# Patient Record
Sex: MALE | Race: WHITE | NOT HISPANIC OR LATINO | URBAN - METROPOLITAN AREA
[De-identification: names, ages, dates, MRNs, and addresses within clinical notes are randomized per-mention and may not be internally consistent; named-entity substitution may affect disease eponyms.]

---

## 2018-03-16 ENCOUNTER — INPATIENT (INPATIENT)
Facility: HOSPITAL | Age: 68
LOS: 4 days | Discharge: NURSING, REHAB OR LONG-TERM CARE | DRG: 536 | End: 2018-03-21
Attending: INTERNAL MEDICINE | Admitting: INTERNAL MEDICINE
Payer: COMMERCIAL

## 2018-03-16 VITALS
OXYGEN SATURATION: 100 % | DIASTOLIC BLOOD PRESSURE: 85 MMHG | SYSTOLIC BLOOD PRESSURE: 127 MMHG | RESPIRATION RATE: 16 BRPM | HEART RATE: 81 BPM | TEMPERATURE: 98 F

## 2018-03-16 DIAGNOSIS — M97.8XXA PERIPROSTHETIC FRACTURE AROUND OTHER INTERNAL PROSTHETIC JOINT, INITIAL ENCOUNTER: ICD-10-CM

## 2018-03-16 DIAGNOSIS — R56.9 UNSPECIFIED CONVULSIONS: ICD-10-CM

## 2018-03-16 DIAGNOSIS — R63.8 OTHER SYMPTOMS AND SIGNS CONCERNING FOOD AND FLUID INTAKE: ICD-10-CM
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
## 2018-03-16 DIAGNOSIS — G40.909 EPILEPSY, UNSPECIFIED, NOT INTRACTABLE, WITHOUT STATUS EPILEPTICUS: ICD-10-CM
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
## 2018-03-16 DIAGNOSIS — E87.1 HYPO-OSMOLALITY AND HYPONATREMIA: ICD-10-CM

## 2018-03-16 DIAGNOSIS — Z29.9 ENCOUNTER FOR PROPHYLACTIC MEASURES, UNSPECIFIED: ICD-10-CM
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
## 2018-03-16 DIAGNOSIS — Z96.649 PRESENCE OF UNSPECIFIED ARTIFICIAL HIP JOINT: Chronic | ICD-10-CM

## 2018-03-16 DIAGNOSIS — S72.002A FRACTURE OF UNSPECIFIED PART OF NECK OF LEFT FEMUR, INITIAL ENCOUNTER FOR CLOSED FRACTURE: ICD-10-CM

## 2018-03-16 DIAGNOSIS — F10.10 ALCOHOL ABUSE, UNCOMPLICATED: ICD-10-CM

## 2018-03-16 LAB
ALBUMIN SERPL ELPH-MCNC: 4.1 G/DL — SIGNIFICANT CHANGE UP (ref 3.3–5)
ALP SERPL-CCNC: 62 U/L — SIGNIFICANT CHANGE UP (ref 40–120)
ALT FLD-CCNC: 21 U/L — SIGNIFICANT CHANGE UP (ref 10–45)
ANION GAP SERPL CALC-SCNC: 20 MMOL/L — HIGH (ref 5–17)
APTT BLD: 27.5 SEC — SIGNIFICANT CHANGE UP (ref 27.5–37.4)
AST SERPL-CCNC: 41 U/L — HIGH (ref 10–40)
B-OH-BUTYR SERPL-SCNC: 0.7 MMOL/L — HIGH
BASOPHILS NFR BLD AUTO: 0.8 % — SIGNIFICANT CHANGE UP (ref 0–2)
BILIRUB SERPL-MCNC: 0.8 MG/DL — SIGNIFICANT CHANGE UP (ref 0.2–1.2)
BLD GP AB SCN SERPL QL: NEGATIVE — SIGNIFICANT CHANGE UP
BUN SERPL-MCNC: 9 MG/DL — SIGNIFICANT CHANGE UP (ref 7–23)
CALCIUM SERPL-MCNC: 8.5 MG/DL — SIGNIFICANT CHANGE UP (ref 8.4–10.5)
CHLORIDE SERPL-SCNC: 81 MMOL/L — LOW (ref 96–108)
CHOLEST SERPL-MCNC: 215 MG/DL — HIGH (ref 10–199)
CO2 SERPL-SCNC: 29 MMOL/L — SIGNIFICANT CHANGE UP (ref 22–31)
CREAT SERPL-MCNC: 0.74 MG/DL — SIGNIFICANT CHANGE UP (ref 0.5–1.3)
EOSINOPHIL NFR BLD AUTO: 0.8 % — SIGNIFICANT CHANGE UP (ref 0–6)
GLUCOSE SERPL-MCNC: 116 MG/DL — HIGH (ref 70–99)
HCT VFR BLD CALC: 42.6 % — SIGNIFICANT CHANGE UP (ref 39–50)
HDLC SERPL-MCNC: 105 MG/DL — SIGNIFICANT CHANGE UP (ref 40–125)
HGB BLD-MCNC: 15.2 G/DL — SIGNIFICANT CHANGE UP (ref 13–17)
INR BLD: 1.08 — SIGNIFICANT CHANGE UP (ref 0.88–1.16)
LIPID PNL WITH DIRECT LDL SERPL: 87 MG/DL — SIGNIFICANT CHANGE UP
LYMPHOCYTES # BLD AUTO: 12.1 % — LOW (ref 13–44)
MAGNESIUM SERPL-MCNC: 2 MG/DL — SIGNIFICANT CHANGE UP (ref 1.6–2.6)
MCHC RBC-ENTMCNC: 34.3 PG — HIGH (ref 27–34)
MCHC RBC-ENTMCNC: 35.7 G/DL — SIGNIFICANT CHANGE UP (ref 32–36)
MCV RBC AUTO: 96.2 FL — SIGNIFICANT CHANGE UP (ref 80–100)
MONOCYTES NFR BLD AUTO: 12.6 % — SIGNIFICANT CHANGE UP (ref 2–14)
NEUTROPHILS NFR BLD AUTO: 73.7 % — SIGNIFICANT CHANGE UP (ref 43–77)
OSMOLALITY SERPL: 315 MOSM/KG — HIGH (ref 280–301)
PHOSPHATE SERPL-MCNC: 3 MG/DL — SIGNIFICANT CHANGE UP (ref 2.5–4.5)
PLATELET # BLD AUTO: 257 K/UL — SIGNIFICANT CHANGE UP (ref 150–400)
POTASSIUM SERPL-MCNC: 3 MMOL/L — LOW (ref 3.5–5.3)
POTASSIUM SERPL-SCNC: 3 MMOL/L — LOW (ref 3.5–5.3)
PROT SERPL-MCNC: 7.4 G/DL — SIGNIFICANT CHANGE UP (ref 6–8.3)
PROTHROM AB SERPL-ACNC: 12 SEC — SIGNIFICANT CHANGE UP (ref 9.8–12.7)
RBC # BLD: 4.43 M/UL — SIGNIFICANT CHANGE UP (ref 4.2–5.8)
RBC # FLD: 12.3 % — SIGNIFICANT CHANGE UP (ref 10.3–16.9)
RH IG SCN BLD-IMP: POSITIVE — SIGNIFICANT CHANGE UP
SODIUM SERPL-SCNC: 130 MMOL/L — LOW (ref 135–145)
TOTAL CHOLESTEROL/HDL RATIO MEASUREMENT: 2 RATIO — LOW (ref 3.4–9.6)
TRIGL SERPL-MCNC: 115 MG/DL — SIGNIFICANT CHANGE UP (ref 10–149)
WBC # BLD: 6.5 K/UL — SIGNIFICANT CHANGE UP (ref 3.8–10.5)
WBC # FLD AUTO: 6.5 K/UL — SIGNIFICANT CHANGE UP (ref 3.8–10.5)

## 2018-03-16 PROCEDURE — 72192 CT PELVIS W/O DYE: CPT | Mod: 26

## 2018-03-16 PROCEDURE — 73502 X-RAY EXAM HIP UNI 2-3 VIEWS: CPT | Mod: 26,LT

## 2018-03-16 PROCEDURE — 99285 EMERGENCY DEPT VISIT HI MDM: CPT

## 2018-03-16 PROCEDURE — 73552 X-RAY EXAM OF FEMUR 2/>: CPT | Mod: 26,LT

## 2018-03-16 RX ORDER — TIMOLOL 0.5 %
1 DROPS OPHTHALMIC (EYE)
Qty: 0 | Refills: 0 | Status: DISCONTINUED | OUTPATIENT
Start: 2018-03-16 | End: 2018-03-21

## 2018-03-16 RX ORDER — PREDNISOLONE SODIUM PHOSPHATE 1 %
1 DROPS OPHTHALMIC (EYE) DAILY
Qty: 0 | Refills: 0 | Status: DISCONTINUED | OUTPATIENT
Start: 2018-03-16 | End: 2018-03-21

## 2018-03-16 RX ORDER — LIDOCAINE 4 G/100G
1 CREAM TOPICAL ONCE
Qty: 0 | Refills: 0 | Status: COMPLETED | OUTPATIENT
Start: 2018-03-16 | End: 2018-03-16

## 2018-03-16 RX ORDER — POTASSIUM CHLORIDE 20 MEQ
60 PACKET (EA) ORAL ONCE
Qty: 0 | Refills: 0 | Status: COMPLETED | OUTPATIENT
Start: 2018-03-16 | End: 2018-03-16

## 2018-03-16 RX ORDER — ACETAMINOPHEN 500 MG
975 TABLET ORAL ONCE
Qty: 0 | Refills: 0 | Status: COMPLETED | OUTPATIENT
Start: 2018-03-16 | End: 2018-03-16

## 2018-03-16 RX ORDER — SODIUM CHLORIDE 9 MG/ML
1000 INJECTION, SOLUTION INTRAVENOUS
Qty: 0 | Refills: 0 | Status: DISCONTINUED | OUTPATIENT
Start: 2018-03-16 | End: 2018-03-17

## 2018-03-16 RX ORDER — HEPARIN SODIUM 5000 [USP'U]/ML
5000 INJECTION INTRAVENOUS; SUBCUTANEOUS EVERY 8 HOURS
Qty: 0 | Refills: 0 | Status: DISCONTINUED | OUTPATIENT
Start: 2018-03-16 | End: 2018-03-21

## 2018-03-16 RX ADMIN — Medication 975 MILLIGRAM(S): at 19:34

## 2018-03-16 RX ADMIN — Medication 975 MILLIGRAM(S): at 20:09

## 2018-03-16 RX ADMIN — LIDOCAINE 1 PATCH: 4 CREAM TOPICAL at 19:35

## 2018-03-16 RX ADMIN — Medication 60 MILLIEQUIVALENT(S): at 23:00

## 2018-03-16 RX ADMIN — Medication 1 DROP(S): at 23:59

## 2018-03-16 RX ADMIN — SODIUM CHLORIDE 100 MILLILITER(S): 9 INJECTION, SOLUTION INTRAVENOUS at 23:59

## 2018-03-16 NOTE — H&P ADULT - PROBLEM SELECTOR PLAN 3
- patient reports history of alcohol abuse, ?withdrawal seizures but patient does not know - currently drinks 4 beers/day - last drink was on evening of 3/15/18 although was at bar tonight   - will check alcohol level   - current CIWA 0 however will monitor CIWA q4hr and monitor for signs of withdrawal   - start banana bag in setting of dehydration and encourage PO - then transition to PO multivitamin, folate, and thiamine

## 2018-03-16 NOTE — H&P ADULT - PROBLEM SELECTOR PLAN 1
- patient presenting today after standing up with severe left leg pain and difficulty with ambulation, neurovascularly intact just tender to palpation on lateral thigh - CT revealed "periprosthetic fracture along the lateral aspect of the left proximal   femur; no underlying osseous lesion is identified to indicate pathologic fracture, however the mechanism of injury is suspicious for pathologic fracture"   - evaluated by ortho - no surgical management at this time   - unclear mechanism of injury - patient says he had a fall approximately 1 month ago, but never had any pain associated with fall previous to today   - concern for possible pathologic fracture - is not UTD with cancer screenings (but no family history of cancer and no smoking history) - no other systemic symptoms or local symptoms elsewhere to suggest any other bony lesions   - will need close outpatient follow up to rule out any underlying malignancy   - as per ortho, WBAT  - consult PT   - pain control with tylenol prn and oxycodone prn with bowel regimen

## 2018-03-16 NOTE — H&P ADULT - NSHPSOCIALHISTORY_GEN_ALL_CORE
Lives with girlfriend. Drinks 4 drinks/day. Never smoker or recreational drug use. Recently retired .

## 2018-03-16 NOTE — CONSULT NOTE ADULT - SUBJECTIVE AND OBJECTIVE BOX
Orthopaedic Consult Note    Consult Requested by:   Surgeon:    CC:Patient is a 67y old  Male who presents with a chief complaint of L hip pain    HPI  66 yo M p/w L hip pain post fall earlier today.  Pt states had L THR done >15y ago at [facility] and has had no issues up until this point. Denies fevers, prior hip pain.     PAST MEDICAL & SURGICAL HISTORY:  Seizure    Allergies    No Known Allergies    Intolerances      MEDICATIONS  (STANDING):  potassium chloride   Powder 60 milliEquivalent(s) Oral once      Vital Signs Last 24 Hrs  T(C): 37 (16 Mar 2018 21:20), Max: 37 (16 Mar 2018 21:20)  T(F): 98.6 (16 Mar 2018 21:20), Max: 98.6 (16 Mar 2018 21:20)  HR: 77 (16 Mar 2018 21:20) (77 - 81)  BP: 124/69 (16 Mar 2018 21:20) (124/69 - 127/85)  BP(mean): --  RR: 16 (16 Mar 2018 21:20) (16 - 16)  SpO2: 100% (16 Mar 2018 21:20) (100% - 100%)    Physical Exam:  AAOx3, NAD  Supine ER bed  L hip TTP  SLR painful at 30 degrees of hip flexion localized at proximal femur  Log roll not painful  Motor/sensory intact  DP/PT 2+  WWP                          15.2   6.5   )-----------( 257      ( 16 Mar 2018 19:35 )             42.6     03-16    130<L>  |  81<L>  |  9   ----------------------------<  116<H>  3.0<L>   |  29  |  0.74    Ca    8.5      16 Mar 2018 19:35    TPro  7.4  /  Alb  4.1  /  TBili  0.8  /  DBili  x   /  AST  41<H>  /  ALT  21  /  AlkPhos  62  03-16    Imaging:   XR w/ L hip s/p THR w/ periprosthetic fracture at proximal aspect of stem - Vanc B1  Distal stem appears to be well-fixed to bone    A/P: 67yMale w/ above  - WBAT  - No sx management at this point in time  - Will follow from ortho standpoint while in house  - f/u w/ Dr Mcnulty outpatient  - Rec. PT  - AM Labs  - Patient educated on findings and prognosis   Discussed with Dr Mcnulty Orthopaedic Consult Note    Consult Requested by:   Surgeon:    CC:Patient is a 67y old  Male who presents with a chief complaint of L hip pain    HPI  66 yo M p/w L hip pain post fall earlier today.  Pt states had L THR done >15y ago at [facility] and has had no issues up until this point. Denies fevers, prior hip pain.     PAST MEDICAL & SURGICAL HISTORY:  Seizure    Allergies    No Known Allergies    Intolerances      MEDICATIONS  (STANDING):  potassium chloride   Powder 60 milliEquivalent(s) Oral once      Vital Signs Last 24 Hrs  T(C): 37 (16 Mar 2018 21:20), Max: 37 (16 Mar 2018 21:20)  T(F): 98.6 (16 Mar 2018 21:20), Max: 98.6 (16 Mar 2018 21:20)  HR: 77 (16 Mar 2018 21:20) (77 - 81)  BP: 124/69 (16 Mar 2018 21:20) (124/69 - 127/85)  BP(mean): --  RR: 16 (16 Mar 2018 21:20) (16 - 16)  SpO2: 100% (16 Mar 2018 21:20) (100% - 100%)    Physical Exam:  AAOx3, NAD  Supine ER bed  L hip TTP  SLR painful at 30 degrees of hip flexion localized at proximal femur  Log roll not painful  Motor/sensory intact  DP/PT 2+  WWP                          15.2   6.5   )-----------( 257      ( 16 Mar 2018 19:35 )             42.6     03-16    130<L>  |  81<L>  |  9   ----------------------------<  116<H>  3.0<L>   |  29  |  0.74    Ca    8.5      16 Mar 2018 19:35    TPro  7.4  /  Alb  4.1  /  TBili  0.8  /  DBili  x   /  AST  41<H>  /  ALT  21  /  AlkPhos  62  03-16    Imaging:   XR w/ L hip s/p THR w/ periprosthetic fracture at proximal aspect of stem - Vanc B1  Distal stem appears to be well-fixed to bone    A/P: 67yMale w/ above  - WBAT w/ assistance  - No sx management at this point in time  - Will follow from ortho standpoint while in house  - f/u w/ Dr Mcnulty outpatient  - Rec. PT  - AM Labs  - Patient educated on findings and prognosis   Discussed with Dr Mcnulty

## 2018-03-16 NOTE — H&P ADULT - NSHPPHYSICALEXAM_GEN_ALL_CORE
.  VITAL SIGNS:  T(C): 36.8 (03-16-18 @ 17:25), Max: 36.8 (03-16-18 @ 17:25)  T(F): 98.2 (03-16-18 @ 17:25), Max: 98.2 (03-16-18 @ 17:25)  HR: 81 (03-16-18 @ 17:25) (81 - 81)  BP: 127/85 (03-16-18 @ 17:25) (127/85 - 127/85)  BP(mean): --  RR: 16 (03-16-18 @ 17:25) (16 - 16)  SpO2: 100% (03-16-18 @ 17:25) (100% - 100%)  Wt(kg): --    PHYSICAL EXAM:    Constitutional: WDWN resting comfortably in bed; NAD  Head: NC/AT  Eyes: PERRL, EOMI, clear conjunctiva  ENT: no nasal discharge; uvula midline, no oropharyngeal erythema or exudates; MMM  Neck: supple; no JVD or thyromegaly  Respiratory: CTA B/L; no W/R/R, no retractions  Cardiac: +S1/S2; RRR; no M/R/G; PMI non-displaced  Gastrointestinal: soft, NT/ND; no rebound or guarding; +BSx4  Genitourinary: normal external genitalia  Back: spine midline, no bony tenderness or step-offs; no CVAT B/L  Extremities: WWP, no clubbing or cyanosis; no peripheral edema  Musculoskeletal: NROM x4; no joint swelling, tenderness or erythema  Vascular: 2+ radial, femoral, DP/PT pulses B/L  Dermatologic: skin warm, dry and intact; no rashes, wounds, or scars  Lymphatic: no submandibular or cervical LAD  Neurologic: AAOx3; CNII-XII grossly intact; no focal deficits  Psychiatric: affect and characteristics of appearance, verbalizations, behaviors are appropriate .  VITAL SIGNS:  T(C): 36.8 (03-16-18 @ 17:25), Max: 36.8 (03-16-18 @ 17:25)  T(F): 98.2 (03-16-18 @ 17:25), Max: 98.2 (03-16-18 @ 17:25)  HR: 81 (03-16-18 @ 17:25) (81 - 81)  BP: 127/85 (03-16-18 @ 17:25) (127/85 - 127/85)  BP(mean): --  RR: 16 (03-16-18 @ 17:25) (16 - 16)  SpO2: 100% (03-16-18 @ 17:25) (100% - 100%)  Wt(kg): --    PHYSICAL EXAM:    Constitutional: pleasant, resting comfortably in bed; NAD  Eyes: PERRL, EOMI, clear conjunctiva  ENT: no nasal discharge; uvula midline, no oropharyngeal erythema or exudates; dry mucous membranes  Neck: supple; no JVD or thyromegaly  Respiratory: CTA B/L; no W/R/R  Cardiac: +S1/S2; RRR; no murmurs  Gastrointestinal: soft, NT/ND; no rebound or guarding; +BSx4  Back: spine midline, no bony tenderness or step-offs; no CVAT B/L  Extremities: WWP, no clubbing or cyanosis; no peripheral edema  Musculoskeletal: NROM x4; no joint swelling; tenderness on lateral aspect of left thigh but full range of motion and 5/5 strength in hip/knee flexors/extensors  Vascular: 2+ radial, femoral, DP/PT pulses B/L  Dermatologic: skin warm, dry and intact; no rashes, wounds, or scars  Lymphatic: no submandibular or cervical LAD  Neurologic: AAOx3; CNII-XII grossly intact; no focal deficits

## 2018-03-16 NOTE — H&P ADULT - ASSESSMENT
68 yo M with seizure disorder (on dilantin), alcohol use, O/A s/p left hip arthroplasty (many years ago) presents after acute episode of left leg pain today, found to have left periprosthetic fracture along lateral aspect of left proximal femur, non operable, admit for pain control and PT.

## 2018-03-16 NOTE — ED PROVIDER NOTE - OBJECTIVE STATEMENT
67 yom pw L buttock and anterior thigh pain today, no preceding trauma.  pt states he was sitting at a bar, for a period of time but intermitting getting up per wife, however, toward the end, felt pain to the aforementioned area, and worse w/ weight bearing.  no prosthesis, no fc, no back pain, no weakness.  pain only localized to the L lower buttock and anterior thigh above the knee.  no swelling. 67 yom pw L buttock and anterior thigh pain today, no preceding trauma.  pt states he was sitting at a bar, for a period of time but intermitting getting up per wife, however, toward the end, felt pain to the aforementioned area, and worse w/ weight bearing.  hx of prosthesis, no fc, no back pain, no weakness.  pain only localized to the L lower buttock and anterior thigh above the knee.  no swelling.

## 2018-03-16 NOTE — ED PROVIDER NOTE - MEDICAL DECISION MAKING DETAILS
atraumatic L buttock and left anterior thigh, nonradiating, reproducible L buttock tenderness, no paresthesia, no back pain, full ROM and strength 5/5 in LE, able to stand but reports pain in L buttock, will obtain imaging studies, very low suspicion for cord compression

## 2018-03-16 NOTE — H&P ADULT - NSHPLABSRESULTS_GEN_ALL_CORE
LABS:                         15.2   6.5   )-----------( 257      ( 16 Mar 2018 19:35 )             42.6     03-16    130<L>  |  81<L>  |  9   ----------------------------<  116<H>  3.0<L>   |  29  |  0.74    Ca    8.5      16 Mar 2018 19:35    TPro  7.4  /  Alb  4.1  /  TBili  0.8  /  DBili  x   /  AST  41<H>  /  ALT  21  /  AlkPhos  62  03-16    PT/INR - ( 16 Mar 2018 19:35 )   PT: 12.0 sec;   INR: 1.08          PTT - ( 16 Mar 2018 19:35 )  PTT:27.5 sec              RADIOLOGY, EKG & ADDITIONAL TESTS:   CT Pelvis:  < from: CT Pelvis Bony Only No Cont (03.16.18 @ 19:29) >    The patient is status post left hip total arthroplasty. There is a   periprosthetic fracturealong the lateral aspect of the left proximal   femur.  The distal fracture fragment is displaced posteriorly up to 6 mm.   No underlying osseous lesion is identified to indicate pathologic   fracture. There is no associated soft tissue mass either. The adjacent   musculature in the anterior thigh appears minimally edematous which could   be posttraumatic. The musculature is otherwise symmetric. There is mild   infiltration of the simultaneous fat on the right. No other fractures   identified. There is degenerative change of the visualized lower lumbar   spine.    No significant abnormality of the intrapelvic contents is visualized.     < end of copied text >

## 2018-03-16 NOTE — ED ADULT NURSE NOTE - OBJECTIVE STATEMENT
68 y/o male c/o left leg pain and numbness. Difficulty bearing weight on leg, no deformity noted. Hx of left leg deformity.

## 2018-03-16 NOTE — ED PROVIDER NOTE - PHYSICAL EXAMINATION
CON: ao x 3, HENMT: clear oropharynx, soft neck, HEAD: atraumatic, CV: rrr, equal pulses b/l, RESP: cta b/l, GI: +BS, soft, nontender, no rebound, no guarding, SKIN: no rash, MSK: no deformities, tenderness to left lower buttock, full ROM active and passive, stable pelvis, no shortening, soft compartment, NEURO: strength 5/5 equal bl in hip flexion/extension, knee flexion/extension, dorsiplantarflexion, and EHL, sensation intact, no paresthesia to perineal region

## 2018-03-16 NOTE — ED PROVIDER NOTE - CARE PLAN
Principal Discharge DX:	Periprosthetic fracture of shaft of femur  Secondary Diagnosis:	Unable to ambulate

## 2018-03-16 NOTE — ED ADULT TRIAGE NOTE - CHIEF COMPLAINT QUOTE
Pt BIBA from bar, states he had 3 alcohol drinks and while sitting began having L leg numbness and when he went to stand he had L upper leg pain and couldn't bear weight. Pt has hx of L hip replacement and seizures. Denies any recent falls. Pt BIBA from bar, states he had 3 alcohol drinks and while sitting began having L leg numbness and when he went to stand he had L upper leg pain and couldn't bear weight. Pt has hx of L hip replacement and seizures. Denies any recent falls.  in triage

## 2018-03-16 NOTE — H&P ADULT - PROBLEM SELECTOR PLAN 2
- patient reports history of seizures "many years ago" and is unclear about etiology but thinks it may have been from heavy alcohol use   - has not had seizure in "many years" and was told by PMD may not need dilantin so has been non compliant, taking only 2-3x/week   - will check dilantin level for now and continue while inpatient but counseled patient he will need close outpatient follow up with close tapering to avoid further seizure episodes and not to stop abruptly

## 2018-03-16 NOTE — ED ADULT NURSE NOTE - CHIEF COMPLAINT QUOTE
Pt BIBA from bar, states he had 3 alcohol drinks and while sitting began having L leg numbness and when he went to stand he had L upper leg pain and couldn't bear weight. Pt has hx of L hip replacement and seizures. Denies any recent falls.  in triage

## 2018-03-16 NOTE — H&P ADULT - HISTORY OF PRESENT ILLNESS
68 yo M with seizure disorder (on dilantin)       In the ED, Tmax 98.2, /85, HR 81, RR 16, O2 100% on RA. 68 yo M with seizure disorder (on dilantin), alcohol use, O/A s/p left hip arthroplasty (many years ago) presents after acute episode of left leg pain today. Patient states that he was at the bar today and sitting for a few hours when he got up to leave and all of a sudden felt acute pain in his left buttock and anterior thigh, worse with standing, 8/10, non radiating. It was difficult for him to ambulate so he came to ED. Never had pain like this before. Does mention he was running for bus about a month ago and slipped and fell onto his left side but after that fall he never had any associated pain or difficulty with walking thereafter. Otherwise has been in his normal state of health but does say he has been a little more dehydrated lately, no changes in urination.     Of note, does not take dilantin daily. Says he was prescribed "many years ago" for seizures but thinks his seizures were due to heavy drinking. He still continues to drink about 4 beers/day, last drink he reports was yesterday, even though he was at the bar this evening. Last seizure was "many years ago" and was told by his PMD who retired that he may not need dilantin anymore - takes it only about once every 2-3 days. Has a new PMD but does not know his name and only saw him once.    Denies fever, chills, weight loss, night sweats, fatigue, chest pain, dyspnea, cough, abdominal pain, dysuria, changes in urination, paresthesias, or leg swelling.     In the ED, Tmax 98.2, /85, HR 81, RR 16, O2 100% on RA. Xray and CT done of left leg/hip which revealed "Periprosthetic fracture along the lateral aspect of the left proximal femur". Evaluated by ortho who did feel patient was operable candidate. 68 yo M with seizure disorder (on dilantin), alcohol use, O/A s/p left hip arthroplasty (many years ago) presents after acute episode of left leg pain today. Patient states that he was at the bar today and sitting for a few hours when he got up to leave and all of a sudden felt acute pain in his left buttock and anterior thigh, worse with standing, 8/10, non radiating. It was difficult for him to ambulate so he came to ED. Never had pain like this before. Does mention he was running for bus about a month ago and slipped and fell onto his left side but after that fall he never had any associated pain or difficulty with walking thereafter. Otherwise has been in his normal state of health but does say he has been a little more dehydrated lately, no changes in urination.     Of note, does not take dilantin daily. Says he was prescribed "many years ago" for seizures but thinks his seizures were due to heavy drinking. He still continues to drink about 4 beers/day, last drink he reports was yesterday, even though he was at the bar this evening. Last seizure was "many years ago" and was told by his PMD who retired that he may not need dilantin anymore - takes it only about once every 2-3 days. Has a new PMD but does not know his name and only saw him once.    Denies fever, chills, weight loss, night sweats, fatigue, chest pain, dyspnea, cough, abdominal pain, dysuria, changes in urination, paresthesias, or leg swelling.     In the ED, Tmax 98.2, /85, HR 81, RR 16, O2 100% on RA. Xray and CT done of left leg/hip which revealed "Periprosthetic fracture along the lateral aspect of the left proximal femur". Evaluated by ortho who did not feel patient was operable candidate.

## 2018-03-16 NOTE — H&P ADULT - PROBLEM SELECTOR PLAN 4
- hypertonic hypovolemic hyponatremia - serum osm elevated at 317 - unclear etiology as normal glucose, denies any abnormal ingestions - will check lipid panel to rule out other etiologies   - also appears dry on exam, will give IVF - hypertonic hypovolemic hyponatremia - serum osm elevated at 317 - unclear etiology as normal glucose, denies any abnormal ingestions - will check lipid panel to rule out other etiologies   - also appears dry on exam, will give IVF  - check urine osm and urine lytes   - trend BMP, goal Na 138 within 24 hours (no more than 8meq/24 hours)

## 2018-03-17 ENCOUNTER — TRANSCRIPTION ENCOUNTER (OUTPATIENT)
Age: 68
End: 2018-03-17

## 2018-03-17 LAB
ANION GAP SERPL CALC-SCNC: 12 MMOL/L — SIGNIFICANT CHANGE UP (ref 5–17)
ANION GAP SERPL CALC-SCNC: 15 MMOL/L — SIGNIFICANT CHANGE UP (ref 5–17)
APPEARANCE UR: CLEAR — SIGNIFICANT CHANGE UP
BASOPHILS NFR BLD AUTO: 0.2 % — SIGNIFICANT CHANGE UP (ref 0–2)
BILIRUB UR-MCNC: NEGATIVE — SIGNIFICANT CHANGE UP
BUN SERPL-MCNC: 6 MG/DL — LOW (ref 7–23)
BUN SERPL-MCNC: 7 MG/DL — SIGNIFICANT CHANGE UP (ref 7–23)
CALCIUM SERPL-MCNC: 8.5 MG/DL — SIGNIFICANT CHANGE UP (ref 8.4–10.5)
CALCIUM SERPL-MCNC: 8.5 MG/DL — SIGNIFICANT CHANGE UP (ref 8.4–10.5)
CHLORIDE SERPL-SCNC: 87 MMOL/L — LOW (ref 96–108)
CHLORIDE SERPL-SCNC: 88 MMOL/L — LOW (ref 96–108)
CO2 SERPL-SCNC: 30 MMOL/L — SIGNIFICANT CHANGE UP (ref 22–31)
CO2 SERPL-SCNC: 33 MMOL/L — HIGH (ref 22–31)
COLOR SPEC: YELLOW — SIGNIFICANT CHANGE UP
CREAT ?TM UR-MCNC: 90 MG/DL — SIGNIFICANT CHANGE UP
CREAT SERPL-MCNC: 0.6 MG/DL — SIGNIFICANT CHANGE UP (ref 0.5–1.3)
CREAT SERPL-MCNC: 0.66 MG/DL — SIGNIFICANT CHANGE UP (ref 0.5–1.3)
DIFF PNL FLD: NEGATIVE — SIGNIFICANT CHANGE UP
EOSINOPHIL NFR BLD AUTO: 1.2 % — SIGNIFICANT CHANGE UP (ref 0–6)
GLUCOSE SERPL-MCNC: 100 MG/DL — HIGH (ref 70–99)
GLUCOSE SERPL-MCNC: 99 MG/DL — SIGNIFICANT CHANGE UP (ref 70–99)
GLUCOSE UR QL: NEGATIVE — SIGNIFICANT CHANGE UP
HCT VFR BLD CALC: 41 % — SIGNIFICANT CHANGE UP (ref 39–50)
HGB BLD-MCNC: 14.8 G/DL — SIGNIFICANT CHANGE UP (ref 13–17)
KETONES UR-MCNC: (no result) MG/DL
LEUKOCYTE ESTERASE UR-ACNC: NEGATIVE — SIGNIFICANT CHANGE UP
LYMPHOCYTES # BLD AUTO: 9.3 % — LOW (ref 13–44)
MAGNESIUM SERPL-MCNC: 2 MG/DL — SIGNIFICANT CHANGE UP (ref 1.6–2.6)
MCHC RBC-ENTMCNC: 34.7 PG — HIGH (ref 27–34)
MCHC RBC-ENTMCNC: 36.1 G/DL — HIGH (ref 32–36)
MCV RBC AUTO: 96 FL — SIGNIFICANT CHANGE UP (ref 80–100)
MONOCYTES NFR BLD AUTO: 14.9 % — HIGH (ref 2–14)
NEUTROPHILS NFR BLD AUTO: 74.4 % — SIGNIFICANT CHANGE UP (ref 43–77)
NITRITE UR-MCNC: NEGATIVE — SIGNIFICANT CHANGE UP
OSMOLALITY UR: 323 MOSMOL/KG — SIGNIFICANT CHANGE UP (ref 100–650)
PH UR: 6 — SIGNIFICANT CHANGE UP (ref 5–8)
PHENYTOIN FREE SERPL-MCNC: 7.9 UG/ML — LOW (ref 10–20)
PHOSPHATE SERPL-MCNC: 2.2 MG/DL — LOW (ref 2.5–4.5)
PLATELET # BLD AUTO: 222 K/UL — SIGNIFICANT CHANGE UP (ref 150–400)
POTASSIUM SERPL-MCNC: 3.7 MMOL/L — SIGNIFICANT CHANGE UP (ref 3.5–5.3)
POTASSIUM SERPL-MCNC: 3.9 MMOL/L — SIGNIFICANT CHANGE UP (ref 3.5–5.3)
POTASSIUM SERPL-SCNC: 3.7 MMOL/L — SIGNIFICANT CHANGE UP (ref 3.5–5.3)
POTASSIUM SERPL-SCNC: 3.9 MMOL/L — SIGNIFICANT CHANGE UP (ref 3.5–5.3)
PROT UR-MCNC: NEGATIVE MG/DL — SIGNIFICANT CHANGE UP
RBC # BLD: 4.27 M/UL — SIGNIFICANT CHANGE UP (ref 4.2–5.8)
RBC # FLD: 12.2 % — SIGNIFICANT CHANGE UP (ref 10.3–16.9)
SODIUM SERPL-SCNC: 132 MMOL/L — LOW (ref 135–145)
SODIUM SERPL-SCNC: 133 MMOL/L — LOW (ref 135–145)
SODIUM UR-SCNC: 37 MMOL/L — SIGNIFICANT CHANGE UP
SP GR SPEC: 1.01 — SIGNIFICANT CHANGE UP (ref 1–1.03)
UROBILINOGEN FLD QL: 1 E.U./DL — SIGNIFICANT CHANGE UP
WBC # BLD: 6.6 K/UL — SIGNIFICANT CHANGE UP (ref 3.8–10.5)
WBC # FLD AUTO: 6.6 K/UL — SIGNIFICANT CHANGE UP (ref 3.8–10.5)

## 2018-03-17 PROCEDURE — 99232 SBSQ HOSP IP/OBS MODERATE 35: CPT

## 2018-03-17 RX ORDER — ACETAMINOPHEN 500 MG
2 TABLET ORAL
Qty: 0 | Refills: 0 | DISCHARGE
Start: 2018-03-17

## 2018-03-17 RX ORDER — DOCUSATE SODIUM 100 MG
1 CAPSULE ORAL
Qty: 0 | Refills: 0 | COMMUNITY
Start: 2018-03-17

## 2018-03-17 RX ORDER — OXYCODONE HYDROCHLORIDE 5 MG/1
5 TABLET ORAL EVERY 6 HOURS
Qty: 0 | Refills: 0 | Status: DISCONTINUED | OUTPATIENT
Start: 2018-03-17 | End: 2018-03-21

## 2018-03-17 RX ORDER — THIAMINE MONONITRATE (VIT B1) 100 MG
100 TABLET ORAL DAILY
Qty: 0 | Refills: 0 | Status: DISCONTINUED | OUTPATIENT
Start: 2018-03-17 | End: 2018-03-21

## 2018-03-17 RX ORDER — THIAMINE MONONITRATE (VIT B1) 100 MG
1 TABLET ORAL
Qty: 0 | Refills: 0 | DISCHARGE
Start: 2018-03-17

## 2018-03-17 RX ORDER — DOCUSATE SODIUM 100 MG
100 CAPSULE ORAL
Qty: 0 | Refills: 0 | Status: DISCONTINUED | OUTPATIENT
Start: 2018-03-17 | End: 2018-03-21

## 2018-03-17 RX ORDER — PREGABALIN 225 MG/1
1000 CAPSULE ORAL DAILY
Qty: 0 | Refills: 0 | Status: DISCONTINUED | OUTPATIENT
Start: 2018-03-17 | End: 2018-03-17

## 2018-03-17 RX ORDER — ACETAMINOPHEN 500 MG
650 TABLET ORAL EVERY 6 HOURS
Qty: 0 | Refills: 0 | Status: DISCONTINUED | OUTPATIENT
Start: 2018-03-17 | End: 2018-03-21

## 2018-03-17 RX ORDER — FOLIC ACID 0.8 MG
1 TABLET ORAL
Qty: 0 | Refills: 0 | DISCHARGE
Start: 2018-03-17

## 2018-03-17 RX ORDER — FOLIC ACID 0.8 MG
1 TABLET ORAL DAILY
Qty: 0 | Refills: 0 | Status: DISCONTINUED | OUTPATIENT
Start: 2018-03-17 | End: 2018-03-21

## 2018-03-17 RX ORDER — DOCUSATE SODIUM 100 MG
1 CAPSULE ORAL
Qty: 0 | Refills: 0 | DISCHARGE
Start: 2018-03-17

## 2018-03-17 RX ORDER — OXYCODONE HYDROCHLORIDE 5 MG/1
1 TABLET ORAL
Qty: 0 | Refills: 0 | DISCHARGE
Start: 2018-03-17

## 2018-03-17 RX ORDER — SENNA PLUS 8.6 MG/1
2 TABLET ORAL
Qty: 0 | Refills: 0 | COMMUNITY
Start: 2018-03-17

## 2018-03-17 RX ORDER — POTASSIUM CHLORIDE 20 MEQ
40 PACKET (EA) ORAL ONCE
Qty: 0 | Refills: 0 | Status: COMPLETED | OUTPATIENT
Start: 2018-03-17 | End: 2018-03-17

## 2018-03-17 RX ORDER — SENNA PLUS 8.6 MG/1
2 TABLET ORAL
Qty: 0 | Refills: 0 | DISCHARGE
Start: 2018-03-17

## 2018-03-17 RX ORDER — SENNA PLUS 8.6 MG/1
2 TABLET ORAL AT BEDTIME
Qty: 0 | Refills: 0 | Status: DISCONTINUED | OUTPATIENT
Start: 2018-03-17 | End: 2018-03-21

## 2018-03-17 RX ADMIN — HEPARIN SODIUM 5000 UNIT(S): 5000 INJECTION INTRAVENOUS; SUBCUTANEOUS at 13:35

## 2018-03-17 RX ADMIN — Medication 1 TABLET(S): at 13:35

## 2018-03-17 RX ADMIN — Medication 30 MILLIGRAM(S): at 00:18

## 2018-03-17 RX ADMIN — Medication 1 DROP(S): at 21:59

## 2018-03-17 RX ADMIN — Medication 40 MILLIEQUIVALENT(S): at 07:31

## 2018-03-17 RX ADMIN — Medication 650 MILLIGRAM(S): at 06:07

## 2018-03-17 RX ADMIN — HEPARIN SODIUM 5000 UNIT(S): 5000 INJECTION INTRAVENOUS; SUBCUTANEOUS at 05:50

## 2018-03-17 RX ADMIN — Medication 650 MILLIGRAM(S): at 22:02

## 2018-03-17 RX ADMIN — Medication 1 DROP(S): at 00:00

## 2018-03-17 RX ADMIN — OXYCODONE HYDROCHLORIDE 5 MILLIGRAM(S): 5 TABLET ORAL at 18:00

## 2018-03-17 RX ADMIN — HEPARIN SODIUM 5000 UNIT(S): 5000 INJECTION INTRAVENOUS; SUBCUTANEOUS at 21:59

## 2018-03-17 RX ADMIN — OXYCODONE HYDROCHLORIDE 5 MILLIGRAM(S): 5 TABLET ORAL at 23:01

## 2018-03-17 RX ADMIN — Medication 100 MILLIGRAM(S): at 18:49

## 2018-03-17 RX ADMIN — LIDOCAINE 1 PATCH: 4 CREAM TOPICAL at 06:55

## 2018-03-17 RX ADMIN — Medication 1 MILLIGRAM(S): at 13:35

## 2018-03-17 RX ADMIN — OXYCODONE HYDROCHLORIDE 5 MILLIGRAM(S): 5 TABLET ORAL at 23:25

## 2018-03-17 RX ADMIN — Medication 650 MILLIGRAM(S): at 06:30

## 2018-03-17 RX ADMIN — SENNA PLUS 2 TABLET(S): 8.6 TABLET ORAL at 21:59

## 2018-03-17 RX ADMIN — OXYCODONE HYDROCHLORIDE 5 MILLIGRAM(S): 5 TABLET ORAL at 17:00

## 2018-03-17 RX ADMIN — Medication 1 DROP(S): at 10:05

## 2018-03-17 RX ADMIN — Medication 30 MILLIGRAM(S): at 21:59

## 2018-03-17 RX ADMIN — Medication 100 MILLIGRAM(S): at 23:01

## 2018-03-17 RX ADMIN — Medication 650 MILLIGRAM(S): at 22:34

## 2018-03-17 NOTE — PROGRESS NOTE ADULT - PROBLEM SELECTOR PLAN 1
Patient presenting today after standing up with severe left leg pain and difficulty with ambulation, neurovascularly intact just tender to palpation on lateral thigh - CT revealed "periprosthetic fracture along the lateral aspect of the left proximal   femur; no underlying osseous lesion is identified to indicate pathologic fracture, however the mechanism of injury is suspicious for pathologic fracture"   - evaluated by ortho - no surgical management at this time   - unclear mechanism of injury - patient says he had a fall approximately 1 month ago, but never had any severe pain associated with fall previous to today   - concern for possible pathologic fracture - is not UTD with cancer screenings (but no family history of cancer and no smoking history) - no other systemic symptoms or local symptoms elsewhere to suggest any other bony lesions   - will need close outpatient follow up to rule out any underlying malignancy   - as per ortho, WBAT  - f/u PT   -c/w Tylenol 650mg PO q6h prn pain   -consider adding oxycodone prn with bowel regimen if pain uncontrolled Patient presenting today after standing up with severe left leg pain and difficulty with ambulation, neurovascularly intact just tender to palpation on lateral thigh - CT revealed "periprosthetic fracture along the lateral aspect of the left proximal   femur; no underlying osseous lesion is identified to indicate pathologic fracture, however the mechanism of injury is suspicious for pathologic fracture"   - evaluated by ortho - no surgical management at this time   - unclear mechanism of injury - patient says he had a fall approximately 1 month ago, but never had any severe pain associated with fall previous to today   - concern for possible pathologic fracture - is not UTD with cancer screenings (but no family history of cancer and no smoking history) - no other systemic symptoms or local symptoms elsewhere to suggest any other bony lesions   - will need close outpatient follow up to rule out any underlying malignancy   - as per ortho, WBAT  - PT recommends CHITO  - c/w Tylenol 650mg PO q6h prn pain   - consider adding oxycodone prn with bowel regimen if pain uncontrolled Patient presenting today after standing up with severe left leg pain and difficulty with ambulation, neurovascularly intact just tender to palpation on lateral thigh - CT revealed "periprosthetic fracture along the lateral aspect of the left proximal   femur; no underlying osseous lesion is identified to indicate pathologic fracture, however the mechanism of injury is suspicious for pathologic fracture"   - evaluated by ortho - no surgical management at this time   - unclear mechanism of injury - patient says he had a fall approximately 1 month ago, but never had any severe pain associated with fall previous to today   - concern for possible pathologic fracture - is not UTD with cancer screenings (but no family history of cancer and no smoking history) - no other systemic symptoms or local symptoms elsewhere to suggest any other bony lesions   - will need close outpatient follow up to rule out any underlying malignancy   - as per ortho, WBAT  - PT recommends CHITO  - c/w Tylenol 650mg PO q6h prn mod pain  - added oxy IR 5mg PO q6h PRN severe pain   - consider adding oxycodone prn with bowel regimen if pain uncontrolled

## 2018-03-17 NOTE — PATIENT PROFILE ADULT. - FUNCTIONAL LEVEL PRIOR: AMBULATION
(2) assistive person Airway patent, nasal mucosa clear, mouth with normal mucosa. Throat has no vesicles, no oropharyngeal exudates and uvula is midline. Clear tympanic membranes bilaterally.

## 2018-03-17 NOTE — PHYSICAL THERAPY INITIAL EVALUATION ADULT - MANUAL MUSCLE TESTING RESULTS, REHAB EVAL
no formal testing on LLE 2/2 pain; BLE grossly at least 3+/5 2/2 ability to perform functional mob against gravity

## 2018-03-17 NOTE — PROGRESS NOTE ADULT - PROBLEM SELECTOR PLAN 6
FEN - regular diet, replete lytes, IVF   Status - full code  Dispo - admit to [facility], PT consult FEN - regular diet, replete lytes, IVF   Status - full code  Dispo - [facility], dispo pending CHITO placement

## 2018-03-17 NOTE — DISCHARGE NOTE ADULT - ADDITIONAL INSTRUCTIONS
Please follow up with your primary care provider about continued use of Dilantin. Please follow up with your Neurologist, Dr. Cristobal Stevenson, about continued use of Dilantin.

## 2018-03-17 NOTE — PHYSICAL THERAPY INITIAL EVALUATION ADULT - ADDITIONAL COMMENTS
Pt lives w/ girlfriend in elevator access apt building w/ no stairs to enter. Denies use of DME for ambulation prior to this admission. Denies hx of recent falls, when asked about fall that occurred 1 month ago pt states that he did fall when running for the bus. No HHA, no HPT

## 2018-03-17 NOTE — DISCHARGE NOTE ADULT - MEDICATION SUMMARY - MEDICATIONS TO TAKE
I will START or STAY ON the medications listed below when I get home from the hospital:    acetaminophen 325 mg oral tablet  -- 2 tab(s) by mouth every 6 hours, As needed, Moderate Pain (4 - 6)  -- Indication: For Periprosthetic fracture of shaft of femur    oxyCODONE 5 mg oral tablet  -- 1 tab(s) by mouth every 6 hours, As needed, Severe Pain (7 - 10)  -- Indication: For Periprosthetic fracture of shaft of femur    Dilantin  -- 300 milligram(s) by mouth once a day  -- Indication: For Seizure disorder    senna oral tablet  -- 2 tab(s) by mouth once a day (at bedtime)  -- Indication: For Constipation    docusate sodium 100 mg oral capsule  -- 1 cap(s) by mouth 2 times a day  -- Indication: For Constipation    timolol hemihydrate 0.5% ophthalmic solution  -- 1 drop(s) to each affected eye 2 times a day  -- Indication: For Glaucoma    prednisoLONE acetate 0.12% ophthalmic suspension  -- 1 drop(s) to each affected eye 2 times a day  -- Indication: For Glaucoma    Multiple Vitamins oral tablet  -- 1 tab(s) by mouth once a day  -- Indication: For Alcohol abuse    folic acid 1 mg oral tablet  -- 1 tab(s) by mouth once a day  -- Indication: For Alcohol abuse    thiamine 100 mg oral tablet  -- 1 tab(s) by mouth once a day  -- Indication: For Alcohol abuse I will START or STAY ON the medications listed below when I get home from the hospital:    acetaminophen 325 mg oral tablet  -- 2 tab(s) by mouth every 6 hours, As needed, Moderate Pain (4 - 6)  -- Indication: For Periprosthetic fracture of shaft of femur    oxyCODONE 5 mg oral tablet  -- 1 tab(s) by mouth every 6 hours, As needed, Severe Pain (7 - 10)  -- Indication: For Periprosthetic fracture of shaft of femur    Dilantin  -- 300 milligram(s) by mouth once a day  -- Indication: For Seizure disorder    senna oral tablet  -- 2 tab(s) by mouth once a day (at bedtime) PRN for constipation  -- Indication: For Constipation    docusate sodium 100 mg oral capsule  -- 1 cap(s) by mouth 2 times a day PRN for constipation  -- Indication: For Constipation    timolol hemihydrate 0.5% ophthalmic solution  -- 1 drop(s) to each affected eye 2 times a day  -- Indication: For Glaucoma    prednisoLONE acetate 0.12% ophthalmic suspension  -- 1 drop(s) to each affected eye 2 times a day  -- Indication: For Glaucoma    Multiple Vitamins oral tablet  -- 1 tab(s) by mouth once a day  -- Indication: For Alcohol abuse    folic acid 1 mg oral tablet  -- 1 tab(s) by mouth once a day  -- Indication: For Alcohol abuse    thiamine 100 mg oral tablet  -- 1 tab(s) by mouth once a day  -- Indication: For Alcohol abuse    cholecalciferol oral tablet  -- 1000 unit(s) by mouth once a day  -- Indication: For Supplement

## 2018-03-17 NOTE — DISCHARGE NOTE ADULT - HOSPITAL COURSE
68 y/o M w/PMH seizure disorder (on dilantin), alcohol use, O/A s/p left hip arthroplasty (many years ago) presents after acute episode of left leg pain. In the ED, Tmax 98.2, /85, HR 81, RR 16, O2 100% on RA. Xray and CT done of left leg/hip which revealed "Periprosthetic fracture along the lateral aspect of the left proximal femur". Evaluated by ortho who did not feel patient was operable candidate. PT recommended CHITO. Pain controlled with tylenol and oxycodone IR 5mg q6h PRN. Of note, pt Dilantin levels low on admission. Pt unclear if he still needs to be on medication, and plan was made for pt to follow up with PMD to determine if he should continue or taper off medication. Pt hemodynamically stable upon discharge.

## 2018-03-17 NOTE — PROGRESS NOTE ADULT - SUBJECTIVE AND OBJECTIVE BOX
OVERNIGHT EVENTS: RAJESH    SUBJECTIVE / INTERVAL HPI: Patient seen and examined at bedside. Pt complaining of continued L hip/leg pain with outward rotation and straightening of leg. Pain is well controlled when pt is not moving. Pt reported his last drink was yesterday, but denied HA, diaphoresis, tremors, anxiety, agitation, AH, VH. Otherwise ROS neg.    VITAL SIGNS:  Vital Signs Last 24 Hrs  T(C): 36.7 (17 Mar 2018 09:23), Max: 37.3 (17 Mar 2018 05:10)  T(F): 98 (17 Mar 2018 09:23), Max: 99.1 (17 Mar 2018 05:10)  HR: 85 (17 Mar 2018 09:23) (73 - 85)  BP: 163/94 (17 Mar 2018 09:23) (124/69 - 163/94)  RR: 18 (17 Mar 2018 09:23) (16 - 18)  SpO2: 97% (17 Mar 2018 09:23) (95% - 100%)    PHYSICAL EXAM:  Constitutional: NAD, laying comfortably in bed on side with pillow between knees  Eyes: PERRL, EOMI, clear conjunctiva  ENT: no nasal discharge; uvula midline, no oropharyngeal erythema or exudates; MMM  Neck: supple; no JVD or thyromegaly  Respiratory: CTA B/L; no W/R/R  Cardiac: +S1/S2; RRR; no murmurs  Gastrointestinal: soft, NT/ND; no rebound or guarding; +BSx4  Back: spine midline, no bony tenderness or step-offs; no CVAT B/L  Extremities: WWP, no clubbing or cyanosis; no peripheral edema  Musculoskeletal: NROM x4; no joint swelling; tenderness on lateral aspect of left thigh, but full range of motion and 5/5 strength in hip/knee flexors/extensors; pain on external rotation and extention of L hip.  Vascular: 2+ distal pulses B/L  Dermatologic: skin warm, dry and intact; no rashes, wounds, or scars  Lymphatic: no submandibular or cervical LAD  Neurologic: AAOx3; no focal deficits    MEDICATIONS:  MEDICATIONS  (STANDING):  heparin  Injectable 5000 Unit(s) SubCutaneous every 8 hours  multivitamin/thiamine/folic acid in sodium chloride 0.9% 1000 milliLiter(s) (100 mL/Hr) IV Continuous <Continuous>  phenytoin   Capsule 30 milliGRAM(s) Oral daily  prednisoLONE acetate 1% Suspension 1 Drop(s) Right EYE daily  timolol 0.5% Solution 1 Drop(s) Both EYES two times a day    MEDICATIONS  (PRN):  acetaminophen   Tablet. 650 milliGRAM(s) Oral every 6 hours PRN Moderate Pain (4 - 6)      ALLERGIES:  Allergies    No Known Allergies    Intolerances        LABS:                        14.8   6.6   )-----------( 222      ( 17 Mar 2018 05:56 )             41.0     -17    133<L>  |  88<L>  |  6<L>  ----------------------------<  100<H>  3.7   |  30  |  0.60    Ca    8.5      17 Mar 2018 05:56  Phos  2.2       Mg     2.0         TPro  7.4  /  Alb  4.1  /  TBili  0.8  /  DBili  x   /  AST  41<H>  /  ALT  21  /  AlkPhos  62  -16    PT/INR - ( 16 Mar 2018 19:35 )   PT: 12.0 sec;   INR: 1.08          PTT - ( 16 Mar 2018 19:35 )  PTT:27.5 sec  Urinalysis Basic - ( 17 Mar 2018 02:39 )    Color: Yellow / Appearance: Clear / S.010 / pH: x  Gluc: x / Ketone: Trace mg/dL  / Bili: Negative / Urobili: 1.0 E.U./dL   Blood: x / Protein: NEGATIVE mg/dL / Nitrite: NEGATIVE   Leuk Esterase: NEGATIVE / RBC: x / WBC x   Sq Epi: x / Non Sq Epi: x / Bacteria: x      CAPILLARY BLOOD GLUCOSE      POCT Blood Glucose.: 112 mg/dL (16 Mar 2018 17:32)      RADIOLOGY & ADDITIONAL TESTS: Reviewed.  < from: CT Pelvis Bony Only No Cont (18 @ 19:29) >  Impression:   Periprosthetic fracture along the anterolateral aspect of the left   proximal femur. No underlying osseous lesion is identified to indicate   pathologic fracture.    The noncemented left total hip prosthesis remains well seated and   anatomically aligned.    < end of copied text >

## 2018-03-17 NOTE — DISCHARGE NOTE ADULT - CARE PLAN
Principal Discharge DX:	Periprosthetic fracture of shaft of femur  Goal:	Evaluate fracture and provide an appropriate treatment plan  Assessment and plan of treatment:	You presented to the hospital with worsening left hip pain in the setting of a fall one month ago. CT and X ray of the pelvis was significant for a periprostetic fracture of shaft of femur  Secondary Diagnosis:	Alcohol abuse  Secondary Diagnosis:	Seizure disorder  Secondary Diagnosis:	Hyponatremia  Secondary Diagnosis:	Nutrition, metabolism, and development symptoms Principal Discharge DX:	Periprosthetic fracture of shaft of femur  Goal:	Evaluate fracture and provide an appropriate treatment plan  Assessment and plan of treatment:	You presented to the hospital with worsening left hip pain in the setting of a fall one month ago. CT and X ray of the pelvis was significant for a periprosthetic fracture of shaft of femur. Ortho evaluated and did not recommend surgical intervention at this time. PT evaluated and recommend subacute rehab.  Secondary Diagnosis:	Alcohol abuse  Assessment and plan of treatment:	You did not show signs of alcohol withdrawal on this admission, but you did report 4-6 drinks a day, which puts you at an increased risk of complications due to heavy alcohol use. Please try to cut back or abstain from alcohol use to avoid long term organ damage.  Secondary Diagnosis:	Seizure disorder  Assessment and plan of treatment:	We continued your home medications on this visit. Please follow up with your provider to determine whether you should continue this medication or slowly taper off the medication.  Secondary Diagnosis:	Hyponatremia  Assessment and plan of treatment:	You were found to have low sodium levels on routine labs. This may have be due to dehydration because this abnormality corrected after IV fluids.  Secondary Diagnosis:	Nutrition, metabolism, and development symptoms  Assessment and plan of treatment:	Please continue a heart healthy, low fat diet. Also, continue the bowel regimen to avoid constipation while taking opioids for pain. Principal Discharge DX:	Periprosthetic fracture of shaft of femur  Goal:	Evaluate fracture and provide an appropriate treatment plan  Assessment and plan of treatment:	You presented to the hospital with worsening left hip pain in the setting of a fall one month ago. CT and X ray of the pelvis was significant for a periprosthetic fracture of shaft of femur. Ortho evaluated and did not recommend surgical intervention at this time. Physical therapy evaluated and recommend subacute rehab.  Secondary Diagnosis:	Alcohol abuse  Assessment and plan of treatment:	You did not show signs of alcohol withdrawal on this admission, but you did report 4-6 drinks a day, which puts you at an increased risk of complications due to heavy alcohol use. Please try to abstain from alcohol use to avoid long term organ damage.  Secondary Diagnosis:	Seizure disorder  Assessment and plan of treatment:	We continued your home medications on this visit. Please follow up with your provider to determine whether you should continue this medication or slowly taper off the medication.  Secondary Diagnosis:	Hyponatremia  Assessment and plan of treatment:	You were found to have low sodium levels on routine labs. This may have be due to dehydration because this abnormality initially corrected after IV fluids; however, your sodium returned to low levels suggesting an additional process. Please follow up with your primary care provider for continued management of this issue.  Secondary Diagnosis:	Nutrition, metabolism, and development symptoms  Assessment and plan of treatment:	Please continue a heart healthy, low fat diet. Also, continue the bowel regimen to avoid constipation while taking opioids for pain.

## 2018-03-17 NOTE — PHYSICAL THERAPY INITIAL EVALUATION ADULT - TRANSFER SAFETY CONCERNS NOTED: SIT/STAND, REHAB EVAL
decreased step length/losing balance/decreased balance during turns/decreased safety awareness/decreased weight-shifting ability

## 2018-03-17 NOTE — DISCHARGE NOTE ADULT - PROVIDER TOKENS
FREE:[LAST:[Graham],FIRST:[Cristobal],PHONE:[(   )    -],FAX:[(   )    -],ADDRESS:[[address] [place] [address] # [facility], [place] [address]    [phone]]]

## 2018-03-17 NOTE — DISCHARGE NOTE ADULT - PATIENT PORTAL LINK FT
You can access the [facility] Patient Portal, offered by [facility], by registering with the following website: [email or web address]

## 2018-03-17 NOTE — DISCHARGE NOTE ADULT - CARE PROVIDER_API CALL
Cristobal Stevenson  [address] [place] [address] [address], [place] [address]    [phone]  Phone: (   )    -  Fax: (   )    -

## 2018-03-17 NOTE — PHYSICAL THERAPY INITIAL EVALUATION ADULT - CRITERIA FOR SKILLED THERAPEUTIC INTERVENTIONS
risk reduction/prevention/rehab potential/impairments found/functional limitations in following categories/anticipated discharge recommendation/therapy frequency

## 2018-03-17 NOTE — PHYSICAL THERAPY INITIAL EVALUATION ADULT - GAIT DEVIATIONS NOTED, PT EVAL
decreased clement/decreased step length/increased time in double stance/decreased weight-shifting ability

## 2018-03-17 NOTE — DISCHARGE NOTE ADULT - PLAN OF CARE
Evaluate fracture and provide an appropriate treatment plan You presented to the hospital with worsening left hip pain in the setting of a fall one month ago. CT and X ray of the pelvis was significant for a periprostetic fracture of shaft of femur You presented to the hospital with worsening left hip pain in the setting of a fall one month ago. CT and X ray of the pelvis was significant for a periprosthetic fracture of shaft of femur. Ortho evaluated and did not recommend surgical intervention at this time. PT evaluated and recommend subacute rehab. You did not show signs of alcohol withdrawal on this admission, but you did report 4-6 drinks a day, which puts you at an increased risk of complications due to heavy alcohol use. Please try to cut back or abstain from alcohol use to avoid long term organ damage. We continued your home medications on this visit. Please follow up with your provider to determine whether you should continue this medication or slowly taper off the medication. You were found to have low sodium levels on routine labs. This may have be due to dehydration because this abnormality corrected after IV fluids. Please continue a heart healthy, low fat diet. Also, continue the bowel regimen to avoid constipation while taking opioids for pain. You presented to the hospital with worsening left hip pain in the setting of a fall one month ago. CT and X ray of the pelvis was significant for a periprosthetic fracture of shaft of femur. Ortho evaluated and did not recommend surgical intervention at this time. Physical therapy evaluated and recommend subacute rehab. You did not show signs of alcohol withdrawal on this admission, but you did report 4-6 drinks a day, which puts you at an increased risk of complications due to heavy alcohol use. Please try to abstain from alcohol use to avoid long term organ damage. You were found to have low sodium levels on routine labs. This may have be due to dehydration because this abnormality initially corrected after IV fluids; however, your sodium returned to low levels suggesting an additional process. Please follow up with your primary care provider for continued management of this issue.

## 2018-03-18 LAB
24R-OH-CALCIDIOL SERPL-MCNC: 13.4 NG/ML — LOW (ref 30–80)
ANION GAP SERPL CALC-SCNC: 9 MMOL/L — SIGNIFICANT CHANGE UP (ref 5–17)
BUN SERPL-MCNC: 7 MG/DL — SIGNIFICANT CHANGE UP (ref 7–23)
CALCIUM SERPL-MCNC: 8.9 MG/DL — SIGNIFICANT CHANGE UP (ref 8.4–10.5)
CHLORIDE SERPL-SCNC: 87 MMOL/L — LOW (ref 96–108)
CO2 SERPL-SCNC: 33 MMOL/L — HIGH (ref 22–31)
CREAT SERPL-MCNC: 0.87 MG/DL — SIGNIFICANT CHANGE UP (ref 0.5–1.3)
GLUCOSE SERPL-MCNC: 132 MG/DL — HIGH (ref 70–99)
MAGNESIUM SERPL-MCNC: 2 MG/DL — SIGNIFICANT CHANGE UP (ref 1.6–2.6)
POTASSIUM SERPL-MCNC: 4.1 MMOL/L — SIGNIFICANT CHANGE UP (ref 3.5–5.3)
POTASSIUM SERPL-SCNC: 4.1 MMOL/L — SIGNIFICANT CHANGE UP (ref 3.5–5.3)
SODIUM SERPL-SCNC: 129 MMOL/L — LOW (ref 135–145)
TSH SERPL-MCNC: 1.76 UIU/ML — SIGNIFICANT CHANGE UP (ref 0.35–4.94)

## 2018-03-18 PROCEDURE — 99232 SBSQ HOSP IP/OBS MODERATE 35: CPT

## 2018-03-18 RX ORDER — CHOLECALCIFEROL (VITAMIN D3) 125 MCG
1000 CAPSULE ORAL DAILY
Qty: 0 | Refills: 0 | Status: DISCONTINUED | OUTPATIENT
Start: 2018-03-18 | End: 2018-03-21

## 2018-03-18 RX ORDER — SODIUM CHLORIDE 9 MG/ML
1000 INJECTION INTRAMUSCULAR; INTRAVENOUS; SUBCUTANEOUS
Qty: 0 | Refills: 0 | Status: DISCONTINUED | OUTPATIENT
Start: 2018-03-18 | End: 2018-03-19

## 2018-03-18 RX ADMIN — OXYCODONE HYDROCHLORIDE 5 MILLIGRAM(S): 5 TABLET ORAL at 12:55

## 2018-03-18 RX ADMIN — OXYCODONE HYDROCHLORIDE 5 MILLIGRAM(S): 5 TABLET ORAL at 18:45

## 2018-03-18 RX ADMIN — Medication 1 DROP(S): at 09:42

## 2018-03-18 RX ADMIN — OXYCODONE HYDROCHLORIDE 5 MILLIGRAM(S): 5 TABLET ORAL at 20:07

## 2018-03-18 RX ADMIN — HEPARIN SODIUM 5000 UNIT(S): 5000 INJECTION INTRAVENOUS; SUBCUTANEOUS at 07:07

## 2018-03-18 RX ADMIN — Medication 1 MILLIGRAM(S): at 11:49

## 2018-03-18 RX ADMIN — Medication 30 MILLIGRAM(S): at 22:56

## 2018-03-18 RX ADMIN — Medication 1 DROP(S): at 22:56

## 2018-03-18 RX ADMIN — Medication 100 MILLIGRAM(S): at 11:49

## 2018-03-18 RX ADMIN — HEPARIN SODIUM 5000 UNIT(S): 5000 INJECTION INTRAVENOUS; SUBCUTANEOUS at 22:56

## 2018-03-18 RX ADMIN — Medication 1 TABLET(S): at 11:49

## 2018-03-18 RX ADMIN — HEPARIN SODIUM 5000 UNIT(S): 5000 INJECTION INTRAVENOUS; SUBCUTANEOUS at 14:30

## 2018-03-18 RX ADMIN — Medication 1000 UNIT(S): at 11:49

## 2018-03-18 RX ADMIN — SODIUM CHLORIDE 70 MILLILITER(S): 9 INJECTION INTRAMUSCULAR; INTRAVENOUS; SUBCUTANEOUS at 14:30

## 2018-03-18 RX ADMIN — Medication 1 DROP(S): at 22:57

## 2018-03-18 RX ADMIN — Medication 100 MILLIGRAM(S): at 18:36

## 2018-03-18 RX ADMIN — OXYCODONE HYDROCHLORIDE 5 MILLIGRAM(S): 5 TABLET ORAL at 11:55

## 2018-03-18 RX ADMIN — SENNA PLUS 2 TABLET(S): 8.6 TABLET ORAL at 22:56

## 2018-03-18 RX ADMIN — Medication 100 MILLIGRAM(S): at 07:07

## 2018-03-19 LAB
ANION GAP SERPL CALC-SCNC: 6 MMOL/L — SIGNIFICANT CHANGE UP (ref 5–17)
BUN SERPL-MCNC: 7 MG/DL — SIGNIFICANT CHANGE UP (ref 7–23)
CALCIUM SERPL-MCNC: 9.1 MG/DL — SIGNIFICANT CHANGE UP (ref 8.4–10.5)
CHLORIDE SERPL-SCNC: 96 MMOL/L — SIGNIFICANT CHANGE UP (ref 96–108)
CO2 SERPL-SCNC: 34 MMOL/L — HIGH (ref 22–31)
CREAT SERPL-MCNC: 0.81 MG/DL — SIGNIFICANT CHANGE UP (ref 0.5–1.3)
GLUCOSE SERPL-MCNC: 122 MG/DL — HIGH (ref 70–99)
HCT VFR BLD CALC: 41.6 % — SIGNIFICANT CHANGE UP (ref 39–50)
HGB BLD-MCNC: 14.3 G/DL — SIGNIFICANT CHANGE UP (ref 13–17)
MAGNESIUM SERPL-MCNC: 2 MG/DL — SIGNIFICANT CHANGE UP (ref 1.6–2.6)
MCHC RBC-ENTMCNC: 34.4 G/DL — SIGNIFICANT CHANGE UP (ref 32–36)
MCHC RBC-ENTMCNC: 34.5 PG — HIGH (ref 27–34)
MCV RBC AUTO: 100.5 FL — HIGH (ref 80–100)
PLATELET # BLD AUTO: 179 K/UL — SIGNIFICANT CHANGE UP (ref 150–400)
POTASSIUM SERPL-MCNC: 4.2 MMOL/L — SIGNIFICANT CHANGE UP (ref 3.5–5.3)
POTASSIUM SERPL-SCNC: 4.2 MMOL/L — SIGNIFICANT CHANGE UP (ref 3.5–5.3)
RBC # BLD: 4.14 M/UL — LOW (ref 4.2–5.8)
RBC # FLD: 12.8 % — SIGNIFICANT CHANGE UP (ref 10.3–16.9)
SODIUM SERPL-SCNC: 136 MMOL/L — SIGNIFICANT CHANGE UP (ref 135–145)
WBC # BLD: 6.7 K/UL — SIGNIFICANT CHANGE UP (ref 3.8–10.5)
WBC # FLD AUTO: 6.7 K/UL — SIGNIFICANT CHANGE UP (ref 3.8–10.5)

## 2018-03-19 PROCEDURE — 99233 SBSQ HOSP IP/OBS HIGH 50: CPT | Mod: GC

## 2018-03-19 RX ORDER — CHOLECALCIFEROL (VITAMIN D3) 125 MCG
1000 CAPSULE ORAL
Qty: 0 | Refills: 0 | DISCHARGE
Start: 2018-03-19

## 2018-03-19 RX ADMIN — SENNA PLUS 2 TABLET(S): 8.6 TABLET ORAL at 23:06

## 2018-03-19 RX ADMIN — Medication 1000 UNIT(S): at 12:34

## 2018-03-19 RX ADMIN — Medication 300 MILLIGRAM(S): at 23:06

## 2018-03-19 RX ADMIN — Medication 100 MILLIGRAM(S): at 18:00

## 2018-03-19 RX ADMIN — Medication 100 MILLIGRAM(S): at 12:34

## 2018-03-19 RX ADMIN — Medication 1 DROP(S): at 23:06

## 2018-03-19 RX ADMIN — Medication 1 MILLIGRAM(S): at 12:34

## 2018-03-19 RX ADMIN — Medication 1 TABLET(S): at 12:34

## 2018-03-19 RX ADMIN — HEPARIN SODIUM 5000 UNIT(S): 5000 INJECTION INTRAVENOUS; SUBCUTANEOUS at 16:04

## 2018-03-19 RX ADMIN — Medication 100 MILLIGRAM(S): at 07:26

## 2018-03-19 RX ADMIN — OXYCODONE HYDROCHLORIDE 5 MILLIGRAM(S): 5 TABLET ORAL at 13:45

## 2018-03-19 RX ADMIN — HEPARIN SODIUM 5000 UNIT(S): 5000 INJECTION INTRAVENOUS; SUBCUTANEOUS at 07:26

## 2018-03-19 RX ADMIN — OXYCODONE HYDROCHLORIDE 5 MILLIGRAM(S): 5 TABLET ORAL at 12:50

## 2018-03-19 RX ADMIN — Medication 1 DROP(S): at 07:27

## 2018-03-19 RX ADMIN — Medication 1 DROP(S): at 18:00

## 2018-03-19 RX ADMIN — HEPARIN SODIUM 5000 UNIT(S): 5000 INJECTION INTRAVENOUS; SUBCUTANEOUS at 23:07

## 2018-03-19 NOTE — PROGRESS NOTE ADULT - ATTENDING COMMENTS
Seen and examined by me this morning. Doing well, improved ROM LLE.  Awaiting CHITO, d/w SW/CM. Rest as above.
Pt seen and examined.  Agree with resident assessment and plan with following addendum.    68 yo M s/p left hip arthroplasty had a fall found to have left periprosthetic fracture of proximal femur    # Femur Fracture  - no surgical intervention per ortho  - WBAT, PT  - pain not well controlled.  increase oxycodone IR frequency to q4, can go up on dose as needed.  Cont tylenol.   - plan for subacute rehab

## 2018-03-19 NOTE — PROGRESS NOTE ADULT - SUBJECTIVE AND OBJECTIVE BOX
OVERNIGHT EVENTS: RAJESH    SUBJECTIVE / INTERVAL HPI: Patient seen and examined at bedside. Pt had no complaints this AM. Pain well controlled. ROS neg.    VITAL SIGNS:  Vital Signs Last 24 Hrs  T(C): 37.4 (19 Mar 2018 05:58), Max: 37.6 (18 Mar 2018 21:34)  T(F): 99.3 (19 Mar 2018 05:58), Max: 99.6 (18 Mar 2018 21:34)  HR: 77 (19 Mar 2018 05:58) (72 - 84)  BP: 127/80 (19 Mar 2018 05:58) (127/80 - 149/83)  RR: 17 (19 Mar 2018 05:58) (17 - 18)  SpO2: 96% (19 Mar 2018 05:58) (96% - 98%)    PHYSICAL EXAM:  Constitutional: NAD, laying comfortably in bed on side with pillow between knees  Eyes: PERRL, EOMI, clear conjunctiva  ENT: no nasal discharge; uvula midline, no oropharyngeal erythema or exudates; MMM  Neck: supple; no JVD or thyromegaly  Respiratory: CTA B/L; no W/R/R  Cardiac: +S1/S2; RRR; no murmurs  Gastrointestinal: soft, NT/ND; no rebound or guarding; +BSx4  Back: spine midline, no bony tenderness or step-offs; no CVAT B/L  Extremities: WWP, no clubbing or cyanosis; no peripheral edema  Musculoskeletal: NROM x4; no joint swelling; tenderness on lateral aspect of left thigh, but full range of motion and 5/5 strength in hip/knee flexors/extensors; pain on external rotation and extention of L hip.  Vascular: 2+ distal pulses B/L  Dermatologic: skin warm, dry and intact; no rashes, wounds, or scars  Lymphatic: no submandibular or cervical LAD  Neurologic: AAOx3; no focal deficits    MEDICATIONS:  MEDICATIONS  (STANDING):  cholecalciferol 1000 Unit(s) Oral daily  docusate sodium 100 milliGRAM(s) Oral two times a day  folic acid 1 milliGRAM(s) Oral daily  heparin  Injectable 5000 Unit(s) SubCutaneous every 8 hours  multivitamin 1 Tablet(s) Oral daily  phenytoin   Capsule 30 milliGRAM(s) Oral daily  prednisoLONE acetate 1% Suspension 1 Drop(s) Right EYE daily  senna 2 Tablet(s) Oral at bedtime  sodium chloride 0.9%. 1000 milliLiter(s) (70 mL/Hr) IV Continuous <Continuous>  thiamine 100 milliGRAM(s) Oral daily  timolol 0.5% Solution 1 Drop(s) Both EYES two times a day    MEDICATIONS  (PRN):  acetaminophen   Tablet. 650 milliGRAM(s) Oral every 6 hours PRN Moderate Pain (4 - 6)  oxyCODONE    IR 5 milliGRAM(s) Oral every 6 hours PRN Severe Pain (7 - 10)      ALLERGIES:  Allergies    No Known Allergies    Intolerances        LABS:    03-18    129<L>  |  87<L>  |  7   ----------------------------<  132<H>  4.1   |  33<H>  |  0.87    Ca    8.9      18 Mar 2018 06:37  Mg     2.0     03-18          CAPILLARY BLOOD GLUCOSE          RADIOLOGY & ADDITIONAL TESTS: Reviewed.

## 2018-03-19 NOTE — PROGRESS NOTE ADULT - PROBLEM SELECTOR PLAN 6
FEN - regular diet, replete lytes, IVF   Status - full code  Dispo - RMF, dispo pending CHITO placement

## 2018-03-19 NOTE — PROGRESS NOTE ADULT - PROBLEM SELECTOR PLAN 1
Patient presenting today after standing up with severe left leg pain and difficulty with ambulation, neurovascularly intact just tender to palpation on lateral thigh - CT revealed "periprosthetic fracture along the lateral aspect of the left proximal   femur; no underlying osseous lesion is identified to indicate pathologic fracture, however the mechanism of injury is suspicious for pathologic fracture"   - evaluated by ortho - no surgical management at this time   - unclear mechanism of injury - patient says he had a fall approximately 1 month ago, but never had any severe pain associated with fall previous to today   - concern for possible pathologic fracture - is not UTD with cancer screenings (but no family history of cancer and no smoking history) - no other systemic symptoms or local symptoms elsewhere to suggest any other bony lesions   - will need close outpatient follow up to rule out any underlying malignancy   - as per ortho, WBAT  - PT recommends CHITO  - c/w Tylenol 650mg PO q6h prn mod pain  - c/w oxy IR 5mg PO q6h PRN severe pain

## 2018-03-20 LAB
ANION GAP SERPL CALC-SCNC: 12 MMOL/L — SIGNIFICANT CHANGE UP (ref 5–17)
BUN SERPL-MCNC: 8 MG/DL — SIGNIFICANT CHANGE UP (ref 7–23)
CALCIUM SERPL-MCNC: 8.8 MG/DL — SIGNIFICANT CHANGE UP (ref 8.4–10.5)
CHLORIDE SERPL-SCNC: 94 MMOL/L — LOW (ref 96–108)
CO2 SERPL-SCNC: 29 MMOL/L — SIGNIFICANT CHANGE UP (ref 22–31)
CREAT SERPL-MCNC: 0.76 MG/DL — SIGNIFICANT CHANGE UP (ref 0.5–1.3)
GLUCOSE SERPL-MCNC: 115 MG/DL — HIGH (ref 70–99)
MAGNESIUM SERPL-MCNC: 2 MG/DL — SIGNIFICANT CHANGE UP (ref 1.6–2.6)
POTASSIUM SERPL-MCNC: 4 MMOL/L — SIGNIFICANT CHANGE UP (ref 3.5–5.3)
POTASSIUM SERPL-SCNC: 4 MMOL/L — SIGNIFICANT CHANGE UP (ref 3.5–5.3)
SODIUM SERPL-SCNC: 135 MMOL/L — SIGNIFICANT CHANGE UP (ref 135–145)

## 2018-03-20 PROCEDURE — 99233 SBSQ HOSP IP/OBS HIGH 50: CPT | Mod: GC

## 2018-03-20 RX ADMIN — HEPARIN SODIUM 5000 UNIT(S): 5000 INJECTION INTRAVENOUS; SUBCUTANEOUS at 06:13

## 2018-03-20 RX ADMIN — Medication 1 TABLET(S): at 12:49

## 2018-03-20 RX ADMIN — HEPARIN SODIUM 5000 UNIT(S): 5000 INJECTION INTRAVENOUS; SUBCUTANEOUS at 14:56

## 2018-03-20 RX ADMIN — OXYCODONE HYDROCHLORIDE 5 MILLIGRAM(S): 5 TABLET ORAL at 06:19

## 2018-03-20 RX ADMIN — Medication 100 MILLIGRAM(S): at 18:03

## 2018-03-20 RX ADMIN — SENNA PLUS 2 TABLET(S): 8.6 TABLET ORAL at 22:04

## 2018-03-20 RX ADMIN — Medication 300 MILLIGRAM(S): at 12:50

## 2018-03-20 RX ADMIN — Medication 1000 UNIT(S): at 12:49

## 2018-03-20 RX ADMIN — Medication 100 MILLIGRAM(S): at 12:49

## 2018-03-20 RX ADMIN — Medication 1 DROP(S): at 06:13

## 2018-03-20 RX ADMIN — Medication 1 DROP(S): at 22:04

## 2018-03-20 RX ADMIN — Medication 1 MILLIGRAM(S): at 12:49

## 2018-03-20 RX ADMIN — HEPARIN SODIUM 5000 UNIT(S): 5000 INJECTION INTRAVENOUS; SUBCUTANEOUS at 22:05

## 2018-03-20 RX ADMIN — Medication 100 MILLIGRAM(S): at 06:13

## 2018-03-20 RX ADMIN — OXYCODONE HYDROCHLORIDE 5 MILLIGRAM(S): 5 TABLET ORAL at 07:21

## 2018-03-20 NOTE — PROGRESS NOTE ADULT - SUBJECTIVE AND OBJECTIVE BOX
OVERNIGHT EVENTS: RAJESH    SUBJECTIVE / INTERVAL HPI: Patient seen and examined at bedside. Pt had no complaints this AM. Pain well controlled. ROS neg.    VITAL SIGNS:  Vital Signs Last 24 Hrs  T(C): 37.3 (20 Mar 2018 05:13), Max: 37.5 (19 Mar 2018 17:10)  T(F): 99.1 (20 Mar 2018 05:13), Max: 99.5 (19 Mar 2018 17:10)  HR: 81 (20 Mar 2018 05:13) (75 - 85)  BP: 148/89 (20 Mar 2018 05:13) (148/89 - 156/94)  RR: 18 (20 Mar 2018 05:13) (16 - 18)  SpO2: 98% (20 Mar 2018 05:13) (95% - 98%)    PHYSICAL EXAM:  Constitutional: NAD, laying comfortably in bed on side with pillow between knees  Eyes: PERRL, EOMI, clear conjunctiva  ENT: no nasal discharge; uvula midline, no oropharyngeal erythema or exudates; MMM  Neck: supple; no JVD or thyromegaly  Respiratory: CTA B/L; no W/R/R  Cardiac: +S1/S2; RRR; no murmurs  Gastrointestinal: soft, NT/ND; no rebound or guarding; +BSx4  Back: spine midline, no bony tenderness or step-offs; no CVAT B/L  Extremities: WWP, no clubbing or cyanosis; no peripheral edema  Musculoskeletal: NROM x4; no joint swelling; tenderness on lateral aspect of left thigh, but full range of motion and 5/5 strength in hip/knee flexors/extensors; pain on external rotation and extention of L hip.  Vascular: 2+ distal pulses B/L  Dermatologic: skin warm, dry and intact; no rashes, wounds, or scars  Lymphatic: no submandibular or cervical LAD  Neurologic: AAOx3; no focal deficits    MEDICATIONS:  MEDICATIONS  (STANDING):  cholecalciferol 1000 Unit(s) Oral daily  docusate sodium 100 milliGRAM(s) Oral two times a day  folic acid 1 milliGRAM(s) Oral daily  heparin  Injectable 5000 Unit(s) SubCutaneous every 8 hours  multivitamin 1 Tablet(s) Oral daily  phenytoin   Capsule 300 milliGRAM(s) Oral daily  prednisoLONE acetate 1% Suspension 1 Drop(s) Right EYE daily  senna 2 Tablet(s) Oral at bedtime  thiamine 100 milliGRAM(s) Oral daily  timolol 0.5% Solution 1 Drop(s) Both EYES two times a day    MEDICATIONS  (PRN):  acetaminophen   Tablet. 650 milliGRAM(s) Oral every 6 hours PRN Moderate Pain (4 - 6)  oxyCODONE    IR 5 milliGRAM(s) Oral every 6 hours PRN Severe Pain (7 - 10)      ALLERGIES:  Allergies    No Known Allergies    Intolerances        LABS:                        14.3   6.7   )-----------( 179      ( 19 Mar 2018 07:35 )             41.6     03-19    136  |  96  |  7   ----------------------------<  122<H>  4.2   |  34<H>  |  0.81    Ca    9.1      19 Mar 2018 07:35  Mg     2.0     03-19          CAPILLARY BLOOD GLUCOSE          RADIOLOGY & ADDITIONAL TESTS: Reviewed.

## 2018-03-21 VITALS
DIASTOLIC BLOOD PRESSURE: 84 MMHG | HEART RATE: 77 BPM | TEMPERATURE: 99 F | RESPIRATION RATE: 18 BRPM | SYSTOLIC BLOOD PRESSURE: 142 MMHG | OXYGEN SATURATION: 97 %

## 2018-03-21 LAB — PHENYTOIN FREE SERPL-MCNC: 1 MG/L — SIGNIFICANT CHANGE UP (ref 1–2)

## 2018-03-21 PROCEDURE — 85730 THROMBOPLASTIN TIME PARTIAL: CPT

## 2018-03-21 PROCEDURE — 73502 X-RAY EXAM HIP UNI 2-3 VIEWS: CPT

## 2018-03-21 PROCEDURE — 97116 GAIT TRAINING THERAPY: CPT

## 2018-03-21 PROCEDURE — 72192 CT PELVIS W/O DYE: CPT

## 2018-03-21 PROCEDURE — 73552 X-RAY EXAM OF FEMUR 2/>: CPT

## 2018-03-21 PROCEDURE — 84100 ASSAY OF PHOSPHORUS: CPT

## 2018-03-21 PROCEDURE — 85025 COMPLETE CBC W/AUTO DIFF WBC: CPT

## 2018-03-21 PROCEDURE — 80185 ASSAY OF PHENYTOIN TOTAL: CPT

## 2018-03-21 PROCEDURE — 84443 ASSAY THYROID STIM HORMONE: CPT

## 2018-03-21 PROCEDURE — 85610 PROTHROMBIN TIME: CPT

## 2018-03-21 PROCEDURE — 82570 ASSAY OF URINE CREATININE: CPT

## 2018-03-21 PROCEDURE — 82010 KETONE BODYS QUAN: CPT

## 2018-03-21 PROCEDURE — 84300 ASSAY OF URINE SODIUM: CPT

## 2018-03-21 PROCEDURE — 36415 COLL VENOUS BLD VENIPUNCTURE: CPT

## 2018-03-21 PROCEDURE — 80186 ASSAY OF PHENYTOIN FREE: CPT

## 2018-03-21 PROCEDURE — 80048 BASIC METABOLIC PNL TOTAL CA: CPT

## 2018-03-21 PROCEDURE — 83930 ASSAY OF BLOOD OSMOLALITY: CPT

## 2018-03-21 PROCEDURE — 85027 COMPLETE CBC AUTOMATED: CPT

## 2018-03-21 PROCEDURE — 83935 ASSAY OF URINE OSMOLALITY: CPT

## 2018-03-21 PROCEDURE — 83735 ASSAY OF MAGNESIUM: CPT

## 2018-03-21 PROCEDURE — 86900 BLOOD TYPING SEROLOGIC ABO: CPT

## 2018-03-21 PROCEDURE — 82962 GLUCOSE BLOOD TEST: CPT

## 2018-03-21 PROCEDURE — 80061 LIPID PANEL: CPT

## 2018-03-21 PROCEDURE — 99285 EMERGENCY DEPT VISIT HI MDM: CPT | Mod: 25

## 2018-03-21 PROCEDURE — 86901 BLOOD TYPING SEROLOGIC RH(D): CPT

## 2018-03-21 PROCEDURE — 81003 URINALYSIS AUTO W/O SCOPE: CPT

## 2018-03-21 PROCEDURE — 86850 RBC ANTIBODY SCREEN: CPT

## 2018-03-21 PROCEDURE — 97161 PT EVAL LOW COMPLEX 20 MIN: CPT

## 2018-03-21 PROCEDURE — 82306 VITAMIN D 25 HYDROXY: CPT

## 2018-03-21 PROCEDURE — 80053 COMPREHEN METABOLIC PANEL: CPT

## 2018-03-21 PROCEDURE — 99239 HOSP IP/OBS DSCHRG MGMT >30: CPT

## 2018-03-21 RX ADMIN — Medication 100 MILLIGRAM(S): at 12:48

## 2018-03-21 RX ADMIN — Medication 100 MILLIGRAM(S): at 06:13

## 2018-03-21 RX ADMIN — Medication 1 DROP(S): at 06:13

## 2018-03-21 RX ADMIN — HEPARIN SODIUM 5000 UNIT(S): 5000 INJECTION INTRAVENOUS; SUBCUTANEOUS at 14:34

## 2018-03-21 RX ADMIN — Medication 1 TABLET(S): at 12:48

## 2018-03-21 RX ADMIN — Medication 1000 UNIT(S): at 12:48

## 2018-03-21 RX ADMIN — Medication 300 MILLIGRAM(S): at 12:48

## 2018-03-21 RX ADMIN — HEPARIN SODIUM 5000 UNIT(S): 5000 INJECTION INTRAVENOUS; SUBCUTANEOUS at 06:13

## 2018-03-21 RX ADMIN — Medication 1 MILLIGRAM(S): at 12:48

## 2018-03-21 NOTE — PROGRESS NOTE ADULT - SUBJECTIVE AND OBJECTIVE BOX
PROGRESS NOTE    CC:   Overnight Events: RAJESH.   Interval History: Reports poor sleep overnight 2/2 to neighbor coughing- otherwise no complaints. Denies current pain in L leg but can be painful with movement intermittently.   ROS: Denies fevers, chills, shortness of breath, chest pain, nausea, vomiting, weakness or paresthesias     OBJECTIVE  Vitals:  T(C): 36.6 (03-21-18 @ 04:54), Max: 37.3 (03-20-18 @ 17:00)  HR: 75 (03-21-18 @ 04:54) (73 - 78)  BP: 154/84 (03-21-18 @ 04:54) (137/81 - 155/83)  RR: 17 (03-21-18 @ 04:54) (16 - 17)  SpO2: 97% (03-21-18 @ 04:54) (97% - 97%)  Wt(kg): --    I/O:  I&O's Summary    20 Mar 2018 07:01  -  21 Mar 2018 07:00  --------------------------------------------------------  IN: 0 mL / OUT: 625 mL / NET: -625 mL        PHYSICAL EXAM:  Appearance: NAD. Speaking in full sentences.   HEENT:  R irregular shaped pupil with L side round reactive to light. Conjunctiva clear b/l. No pallor noted. Moist oral mucosa.  Cardiovascular: RRR with no murmurs.  Respiratory: Lungs CTAB.   Gastrointestinal:  Soft, nontender. Non-distended. Non-rigid.	  Extremities: No edema b/l. No erythema b/l. LE WWP b/l.  Vascular: DP 2+ b/l.  Neurologic:  Alert and awake. Moving all extremities. Following commands. Making eye contact.  	  LABS:    03-20    135  |  94<L>  |  8   ----------------------------<  115<H>  4.0   |  29  |  0.76    Ca    8.8      20 Mar 2018 06:32  Mg     2.0     03-20            RADIOLOGY & ADDITIONAL TESTS:  Reviewed .    MEDICATIONS  (STANDING):  cholecalciferol 1000 Unit(s) Oral daily  docusate sodium 100 milliGRAM(s) Oral two times a day  folic acid 1 milliGRAM(s) Oral daily  heparin  Injectable 5000 Unit(s) SubCutaneous every 8 hours  multivitamin 1 Tablet(s) Oral daily  phenytoin   Capsule 300 milliGRAM(s) Oral daily  prednisoLONE acetate 1% Suspension 1 Drop(s) Right EYE daily  senna 2 Tablet(s) Oral at bedtime  thiamine 100 milliGRAM(s) Oral daily  timolol 0.5% Solution 1 Drop(s) Both EYES two times a day    MEDICATIONS  (PRN):  acetaminophen   Tablet. 650 milliGRAM(s) Oral every 6 hours PRN Moderate Pain (4 - 6)  oxyCODONE    IR 5 milliGRAM(s) Oral every 6 hours PRN Severe Pain (7 - 10)      ASSESSMENT:    PLAN: PROGRESS NOTE    CC: left leg pain and inability to ambulate  Overnight Events: RAJESH.   Interval History: Reports poor sleep overnight 2/2 to neighbor coughing- otherwise no complaints. Denies current pain in L leg but can be painful with movement intermittently.   ROS: Denies fevers, chills, shortness of breath, chest pain, nausea, vomiting, weakness or paresthesias     OBJECTIVE  Vitals:  T(C): 36.6 (03-21-18 @ 04:54), Max: 37.3 (03-20-18 @ 17:00)  HR: 75 (03-21-18 @ 04:54) (73 - 78)  BP: 154/84 (03-21-18 @ 04:54) (137/81 - 155/83)  RR: 17 (03-21-18 @ 04:54) (16 - 17)  SpO2: 97% (03-21-18 @ 04:54) (97% - 97%)  Wt(kg): --    I/O:  I&O's Summary    20 Mar 2018 07:01  -  21 Mar 2018 07:00  --------------------------------------------------------  IN: 0 mL / OUT: 625 mL / NET: -625 mL        PHYSICAL EXAM:  Appearance: NAD. Speaking in full sentences.   HEENT:  R irregular shaped pupil with L side round reactive to light. Conjunctiva clear b/l. No pallor noted. Moist oral mucosa.  Cardiovascular: RRR with no murmurs.  Respiratory: Lungs CTAB.   Gastrointestinal:  Soft, nontender. Non-distended. Non-rigid.	  Extremities: No edema b/l. No erythema b/l. LE WWP b/l.  Vascular: DP 2+ b/l.  Neurologic:  Alert and awake. Moving all extremities. Following commands. Making eye contact.  	  LABS:    03-20    135  |  94<L>  |  8   ----------------------------<  115<H>  4.0   |  29  |  0.76    Ca    8.8      20 Mar 2018 06:32  Mg     2.0     03-20            RADIOLOGY & ADDITIONAL TESTS:  Reviewed .    MEDICATIONS  (STANDING):  cholecalciferol 1000 Unit(s) Oral daily  docusate sodium 100 milliGRAM(s) Oral two times a day  folic acid 1 milliGRAM(s) Oral daily  heparin  Injectable 5000 Unit(s) SubCutaneous every 8 hours  multivitamin 1 Tablet(s) Oral daily  phenytoin   Capsule 300 milliGRAM(s) Oral daily  prednisoLONE acetate 1% Suspension 1 Drop(s) Right EYE daily  senna 2 Tablet(s) Oral at bedtime  thiamine 100 milliGRAM(s) Oral daily  timolol 0.5% Solution 1 Drop(s) Both EYES two times a day    MEDICATIONS  (PRN):  acetaminophen   Tablet. 650 milliGRAM(s) Oral every 6 hours PRN Moderate Pain (4 - 6)  oxyCODONE    IR 5 milliGRAM(s) Oral every 6 hours PRN Severe Pain (7 - 10)      ASSESSMENT:    PLAN:

## 2018-03-21 NOTE — PROGRESS NOTE ADULT - ASSESSMENT
67M  with PMH seizure disorder (on dilantin), alcohol use, O/A s/p left hip arthroplasty (many years ago) presented initially after acute worsening of left leg pain and admitted to [facility] with left periprosthetic fracture along lateral aspect of left proximal femur. Patient determined non operable, recommended for CHITO and pending placement.
68 y/o M w/PMH seizure disorder (on dilantin), alcohol use, O/A s/p left hip arthroplasty (many years ago), and fall last month presents after acute worsening of left leg pain today, found to have left periprosthetic fracture along lateral aspect of left proximal femur, non operable, admitted for pain control and PT.
68 y/o M w/PMH seizure disorder (on dilantin), alcohol use, O/A s/p left hip arthroplasty (many years ago), and fall last month presents after acute worsening of left leg pain today, found to have left periprosthetic fracture along lateral aspect of left proximal femur, non operable, recommended for CHITO and pending placement.
Pt seen and examined. Pain controlled    66 yo M s/p left hip arthroplasty had a fall found to have left periprosthetic fracture of proximal femur    # Femur Fracture  - no surgical intervention per ortho  - WBAT, PT  - pain better controlled.  continue oxycodone IR 5mg q6 prn.  Cont tylenol.   - plan for subacute rehab .
68 y/o M w/PMH seizure disorder (on dilantin), alcohol use, O/A s/p left hip arthroplasty (many years ago), and fall last month presents after acute worsening of left leg pain today, found to have left periprosthetic fracture along lateral aspect of left proximal femur, non operable, admitted for pain control and PT.

## 2018-03-21 NOTE — PROGRESS NOTE ADULT - PROBLEM SELECTOR PLAN 4
Hypertonic hypovolemic hyponatremia - serum osm elevated at 317 - unclear etiology as normal glucose, denies any abnormal ingestions   -f/u lipid panel  -c/w banana bag  -f/u urine osm and urine lytes   -trend BMP, goal Na 138 within 24 hours (no more than 8meq/24 hours)
Found to have hypovolemic hyponatremia for which has resolved at this time. Work up has been unclear- though concern for potential beer potomania- TSH wnl, unlikely SIADH- has resolved with IVF.
Hypertonic hypovolemic hyponatremia - serum osm elevated at 317 - unclear etiology as normal glucose, denies any abnormal ingestions. Continues to be hyponatremic yesterday; TSH wnl, calculated serum osm 258, consider beer potamania. Less likely SIADH as urine sodium and urine osmo doesn't support this diagnosis.   -f/u lipid panel  -c/w NS 80cc/hr   -trend BMP,  no more than 8meq/24 hours
Hypertonic hypovolemic hyponatremia - serum osm elevated at 317 - unclear etiology as normal glucose, denies any abnormal ingestions. Continues to be hyponatremic yesterday; TSH wnl, calculated serum osm 258, consider beer potamania. Less likely SIADH as urine sodium and urine osmo doesn't support this diagnosis.   -f/u lipid panel  -c/w NS 80cc/hr   -trend BMP,  no more than 8meq/24 hours

## 2018-03-21 NOTE — PROGRESS NOTE ADULT - PROBLEM SELECTOR PLAN 1
Presented initially with severe left leg pain and difficulty with ambulation, neurovascularly intact just tender to palpation on lateral thigh. CT demonstrated periprosthetic fracture along the lateral aspect of the left proximal femur- no identified osseous lesion but concern for mechanism of injury that may be pathological.   - Has been followed by ortho and not candidate for further intervention.  - concern for possible pathologic fracture - not UTD with cancer screenings (but no family history of cancer and no smoking history) - will require further outpatient work up for malignancy  - c/w pain regimen: Tylenol 650mg PO q6h prn mod pain and oxy IR 5mg PO q6h PRN severe pain

## 2018-03-21 NOTE — PROGRESS NOTE ADULT - PROBLEM SELECTOR PROBLEM 1
Periprosthetic fracture of shaft of femur

## 2018-03-21 NOTE — PROGRESS NOTE ADULT - PROBLEM SELECTOR PLAN 2
Patient reports history of seizures "many years ago" and is unclear about etiology, but thinks it may have been from heavy alcohol use. Pt has not had seizure in "many years" and was told by PMD may not need dilantin so has been non compliant, taking only 2-3x/week; Dilantin levels low on admission  -c/w Dilantin 30mg PO qd while inpatient, but counseled patient he will need close outpatient follow up to taper off medication to avoid further seizure episodes and not to stop abruptly
Patient reports history of seizures "many years ago" and is unclear about etiology, but thinks it may have been from heavy alcohol use. Pt has not had seizure in "many years" and was told by PMD may not need dilantin so has been non compliant, taking only 2-3x/week; Dilantin levels low on admission  -c/w Dilantin 30mg PO qd while inpatient, but counseled patient he will need close outpatient follow up to taper off medication to avoid further seizure episodes and not to stop abruptly
Reports seizure hx but etiology unclear- from heavy alcohol use. Reports noncompliance with dilantin (taking 2-3 times a week) with low levels noted during admission.   -c/w Dilantin 30mg PO qd at this time but has been counselled during course to be followed up for taper as outpatient
Patient reports history of seizures "many years ago" and is unclear about etiology, but thinks it may have been from heavy alcohol use. Pt has not had seizure in "many years" and was told by PMD may not need dilantin so has been non compliant, taking only 2-3x/week; Dilantin levels low on admission  -c/w Dilantin 30mg PO qd while inpatient, but counseled patient he will need close outpatient follow up to taper off medication to avoid further seizure episodes and not to stop abruptly

## 2018-03-21 NOTE — PROGRESS NOTE ADULT - PROBLEM SELECTOR PLAN 3
Patient reports history of alcohol abuse, possible withdrawal seizures, but patient does not know - currently drinks 4 beers/day - last drink was on evening of 3/16/18. CIWA 0 on admission and this AM.   -monitor CIWA q4hr and monitor for signs of withdrawal   -s/p banana bag in setting of dehydration and encourage PO  -c/w PO multivitamin, folate, and thiamine
Hx of ETOH abuse, possible withdrawal seizures, but patient does not know - currently drinks 4 beers/day - last drink was on evening of 3/16/18. CIWA remains 0 though now out of 96 hour window- low likelihood of withdrawal.   -c/w PO multivitamin, folate, and thiamine
Patient reports history of alcohol abuse, possible withdrawal seizures, but patient does not know - currently drinks 4 beers/day - last drink was on evening of 3/16/18. CIWA 0 on admission and this AM.   -monitor CIWA q4hr and monitor for signs of withdrawal   -s/p banana bag in setting of dehydration and encourage PO  -c/w PO multivitamin, folate, and thiamine
Patient reports history of alcohol abuse, possible withdrawal seizures, but patient does not know - currently drinks 4 beers/day - last drink was on evening of 3/16/18. CIWA 0 on admission and this AM.   -monitor CIWA q4hr and monitor for signs of withdrawal   -s/p banana bag in setting of dehydration and encourage PO  -c/w PO multivitamin, folate, and thiamine

## 2018-03-21 NOTE — PROGRESS NOTE ADULT - PROBLEM SELECTOR PLAN 6
F: No longer on IVF, encourage PO intake.  E: Replete electrolytes to maintain K>4 and Mg>2  N:  Regular Diet as tolerated    FULL CODE    Dispo: Admitted to [facility] for L periprosthetic fracture, pending CHITO placment

## 2018-03-23 DIAGNOSIS — M97.02XA PERIPROSTHETIC FRACTURE AROUND INTERNAL PROSTHETIC LEFT HIP JOINT, INITIAL ENCOUNTER: ICD-10-CM

## 2018-03-23 DIAGNOSIS — T84.84XA PAIN DUE TO INTERNAL ORTHOPEDIC PROSTHETIC DEVICES, IMPLANTS AND GRAFTS, INITIAL ENCOUNTER: ICD-10-CM
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
## 2018-03-23 DIAGNOSIS — Z91.14 PATIENT'S OTHER NONCOMPLIANCE WITH MEDICATION REGIMEN: ICD-10-CM

## 2018-03-23 DIAGNOSIS — G40.909 EPILEPSY, UNSPECIFIED, NOT INTRACTABLE, WITHOUT STATUS EPILEPTICUS: ICD-10-CM

## 2018-03-23 DIAGNOSIS — F10.10 ALCOHOL ABUSE, UNCOMPLICATED: ICD-10-CM

## 2018-03-23 DIAGNOSIS — M79.605 PAIN IN LEFT LEG: ICD-10-CM

## 2018-03-23 DIAGNOSIS — E87.1 HYPO-OSMOLALITY AND HYPONATREMIA: ICD-10-CM

## 2019-08-06 ENCOUNTER — INPATIENT (INPATIENT)
Facility: HOSPITAL | Age: 69
LOS: 1 days | Discharge: NURSING, REHAB OR LONG-TERM CARE | DRG: 640 | End: 2019-08-08
Attending: INTERNAL MEDICINE | Admitting: INTERNAL MEDICINE
Payer: MEDICARE

## 2019-08-06 VITALS
RESPIRATION RATE: 17 BRPM | SYSTOLIC BLOOD PRESSURE: 129 MMHG | TEMPERATURE: 98 F | DIASTOLIC BLOOD PRESSURE: 96 MMHG | HEART RATE: 103 BPM | OXYGEN SATURATION: 99 %

## 2019-08-06 DIAGNOSIS — Z29.9 ENCOUNTER FOR PROPHYLACTIC MEASURES, UNSPECIFIED: ICD-10-CM

## 2019-08-06 DIAGNOSIS — E87.1 HYPO-OSMOLALITY AND HYPONATREMIA: ICD-10-CM

## 2019-08-06 DIAGNOSIS — R94.31 ABNORMAL ELECTROCARDIOGRAM [ECG] [EKG]: ICD-10-CM

## 2019-08-06 DIAGNOSIS — R53.1 WEAKNESS: ICD-10-CM

## 2019-08-06 DIAGNOSIS — G93.40 ENCEPHALOPATHY, UNSPECIFIED: ICD-10-CM

## 2019-08-06 DIAGNOSIS — R41.82 ALTERED MENTAL STATUS, UNSPECIFIED: ICD-10-CM

## 2019-08-06 DIAGNOSIS — Z91.89 OTHER SPECIFIED PERSONAL RISK FACTORS, NOT ELSEWHERE CLASSIFIED: ICD-10-CM

## 2019-08-06 DIAGNOSIS — Z96.649 PRESENCE OF UNSPECIFIED ARTIFICIAL HIP JOINT: Chronic | ICD-10-CM

## 2019-08-06 DIAGNOSIS — R63.8 OTHER SYMPTOMS AND SIGNS CONCERNING FOOD AND FLUID INTAKE: ICD-10-CM

## 2019-08-06 PROBLEM — R56.9 UNSPECIFIED CONVULSIONS: Chronic | Status: ACTIVE | Noted: 2018-03-16

## 2019-08-06 LAB
ALBUMIN SERPL ELPH-MCNC: 3.6 G/DL — SIGNIFICANT CHANGE UP (ref 3.3–5)
ALP SERPL-CCNC: 54 U/L — SIGNIFICANT CHANGE UP (ref 40–120)
ALT FLD-CCNC: 12 U/L — SIGNIFICANT CHANGE UP (ref 10–45)
AMPHET UR-MCNC: NEGATIVE — SIGNIFICANT CHANGE UP
ANION GAP SERPL CALC-SCNC: 14 MMOL/L — SIGNIFICANT CHANGE UP (ref 5–17)
ANION GAP SERPL CALC-SCNC: 17 MMOL/L — SIGNIFICANT CHANGE UP (ref 5–17)
APPEARANCE UR: CLEAR — SIGNIFICANT CHANGE UP
APTT BLD: 30.7 SEC — SIGNIFICANT CHANGE UP (ref 27.5–36.3)
AST SERPL-CCNC: 25 U/L — SIGNIFICANT CHANGE UP (ref 10–40)
BARBITURATES UR SCN-MCNC: NEGATIVE — SIGNIFICANT CHANGE UP
BASOPHILS # BLD AUTO: 0.03 K/UL — SIGNIFICANT CHANGE UP (ref 0–0.2)
BASOPHILS NFR BLD AUTO: 0.3 % — SIGNIFICANT CHANGE UP (ref 0–2)
BENZODIAZ UR-MCNC: NEGATIVE — SIGNIFICANT CHANGE UP
BILIRUB SERPL-MCNC: 1 MG/DL — SIGNIFICANT CHANGE UP (ref 0.2–1.2)
BILIRUB UR-MCNC: NEGATIVE — SIGNIFICANT CHANGE UP
BUN SERPL-MCNC: 10 MG/DL — SIGNIFICANT CHANGE UP (ref 7–23)
BUN SERPL-MCNC: 12 MG/DL — SIGNIFICANT CHANGE UP (ref 7–23)
CALCIUM SERPL-MCNC: 8.6 MG/DL — SIGNIFICANT CHANGE UP (ref 8.4–10.5)
CALCIUM SERPL-MCNC: 9.1 MG/DL — SIGNIFICANT CHANGE UP (ref 8.4–10.5)
CHLORIDE SERPL-SCNC: 92 MMOL/L — LOW (ref 96–108)
CHLORIDE SERPL-SCNC: 99 MMOL/L — SIGNIFICANT CHANGE UP (ref 96–108)
CO2 SERPL-SCNC: 22 MMOL/L — SIGNIFICANT CHANGE UP (ref 22–31)
CO2 SERPL-SCNC: 23 MMOL/L — SIGNIFICANT CHANGE UP (ref 22–31)
COCAINE METAB.OTHER UR-MCNC: NEGATIVE — SIGNIFICANT CHANGE UP
COLOR SPEC: YELLOW — SIGNIFICANT CHANGE UP
CREAT SERPL-MCNC: 0.87 MG/DL — SIGNIFICANT CHANGE UP (ref 0.5–1.3)
CREAT SERPL-MCNC: 0.87 MG/DL — SIGNIFICANT CHANGE UP (ref 0.5–1.3)
DIFF PNL FLD: NEGATIVE — SIGNIFICANT CHANGE UP
EOSINOPHIL # BLD AUTO: 0.06 K/UL — SIGNIFICANT CHANGE UP (ref 0–0.5)
EOSINOPHIL NFR BLD AUTO: 0.7 % — SIGNIFICANT CHANGE UP (ref 0–6)
GLUCOSE SERPL-MCNC: 74 MG/DL — SIGNIFICANT CHANGE UP (ref 70–99)
GLUCOSE SERPL-MCNC: 90 MG/DL — SIGNIFICANT CHANGE UP (ref 70–99)
GLUCOSE UR QL: NEGATIVE — SIGNIFICANT CHANGE UP
HCT VFR BLD CALC: 51.3 % — HIGH (ref 39–50)
HGB BLD-MCNC: 17.3 G/DL — HIGH (ref 13–17)
IMM GRANULOCYTES NFR BLD AUTO: 0.6 % — SIGNIFICANT CHANGE UP (ref 0–1.5)
INR BLD: 1.03 — SIGNIFICANT CHANGE UP (ref 0.88–1.16)
KETONES UR-MCNC: 15 MG/DL
LEUKOCYTE ESTERASE UR-ACNC: NEGATIVE — SIGNIFICANT CHANGE UP
LYMPHOCYTES # BLD AUTO: 1.23 K/UL — SIGNIFICANT CHANGE UP (ref 1–3.3)
LYMPHOCYTES # BLD AUTO: 13.8 % — SIGNIFICANT CHANGE UP (ref 13–44)
MCHC RBC-ENTMCNC: 33.7 GM/DL — SIGNIFICANT CHANGE UP (ref 32–36)
MCHC RBC-ENTMCNC: 34.6 PG — HIGH (ref 27–34)
MCV RBC AUTO: 102.6 FL — HIGH (ref 80–100)
METHADONE UR-MCNC: NEGATIVE — SIGNIFICANT CHANGE UP
MONOCYTES # BLD AUTO: 1.02 K/UL — HIGH (ref 0–0.9)
MONOCYTES NFR BLD AUTO: 11.5 % — SIGNIFICANT CHANGE UP (ref 2–14)
NEUTROPHILS # BLD AUTO: 6.51 K/UL — SIGNIFICANT CHANGE UP (ref 1.8–7.4)
NEUTROPHILS NFR BLD AUTO: 73.1 % — SIGNIFICANT CHANGE UP (ref 43–77)
NITRITE UR-MCNC: NEGATIVE — SIGNIFICANT CHANGE UP
NRBC # BLD: 0 /100 WBCS — SIGNIFICANT CHANGE UP (ref 0–0)
OPIATES UR-MCNC: NEGATIVE — SIGNIFICANT CHANGE UP
PCP SPEC-MCNC: SIGNIFICANT CHANGE UP
PCP UR-MCNC: NEGATIVE — SIGNIFICANT CHANGE UP
PH UR: 5.5 — SIGNIFICANT CHANGE UP (ref 5–8)
PLATELET # BLD AUTO: 203 K/UL — SIGNIFICANT CHANGE UP (ref 150–400)
POTASSIUM SERPL-MCNC: 4.3 MMOL/L — SIGNIFICANT CHANGE UP (ref 3.5–5.3)
POTASSIUM SERPL-MCNC: 4.8 MMOL/L — SIGNIFICANT CHANGE UP (ref 3.5–5.3)
POTASSIUM SERPL-SCNC: 4.3 MMOL/L — SIGNIFICANT CHANGE UP (ref 3.5–5.3)
POTASSIUM SERPL-SCNC: 4.8 MMOL/L — SIGNIFICANT CHANGE UP (ref 3.5–5.3)
PROT SERPL-MCNC: 7.1 G/DL — SIGNIFICANT CHANGE UP (ref 6–8.3)
PROT UR-MCNC: NEGATIVE MG/DL — SIGNIFICANT CHANGE UP
PROTHROM AB SERPL-ACNC: 11.7 SEC — SIGNIFICANT CHANGE UP (ref 10–12.9)
RBC # BLD: 5 M/UL — SIGNIFICANT CHANGE UP (ref 4.2–5.8)
RBC # FLD: 12.8 % — SIGNIFICANT CHANGE UP (ref 10.3–14.5)
SODIUM SERPL-SCNC: 131 MMOL/L — LOW (ref 135–145)
SODIUM SERPL-SCNC: 136 MMOL/L — SIGNIFICANT CHANGE UP (ref 135–145)
SODIUM UR-SCNC: <20 MMOL/L — SIGNIFICANT CHANGE UP
SP GR SPEC: 1.02 — SIGNIFICANT CHANGE UP (ref 1–1.03)
THC UR QL: NEGATIVE — SIGNIFICANT CHANGE UP
TROPONIN T SERPL-MCNC: <0.01 NG/ML — SIGNIFICANT CHANGE UP (ref 0–0.01)
TSH SERPL-MCNC: 1.74 UIU/ML — SIGNIFICANT CHANGE UP (ref 0.35–4.94)
UROBILINOGEN FLD QL: 0.2 E.U./DL — SIGNIFICANT CHANGE UP
WBC # BLD: 8.9 K/UL — SIGNIFICANT CHANGE UP (ref 3.8–10.5)
WBC # FLD AUTO: 8.9 K/UL — SIGNIFICANT CHANGE UP (ref 3.8–10.5)

## 2019-08-06 PROCEDURE — 99223 1ST HOSP IP/OBS HIGH 75: CPT | Mod: GC

## 2019-08-06 PROCEDURE — 71045 X-RAY EXAM CHEST 1 VIEW: CPT | Mod: 26

## 2019-08-06 PROCEDURE — 99285 EMERGENCY DEPT VISIT HI MDM: CPT | Mod: 25

## 2019-08-06 PROCEDURE — 93010 ELECTROCARDIOGRAM REPORT: CPT

## 2019-08-06 RX ORDER — SODIUM CHLORIDE 9 MG/ML
1000 INJECTION INTRAMUSCULAR; INTRAVENOUS; SUBCUTANEOUS
Refills: 0 | Status: DISCONTINUED | OUTPATIENT
Start: 2019-08-06 | End: 2019-08-06

## 2019-08-06 RX ORDER — ENOXAPARIN SODIUM 100 MG/ML
30 INJECTION SUBCUTANEOUS DAILY
Refills: 0 | Status: DISCONTINUED | OUTPATIENT
Start: 2019-08-06 | End: 2019-08-06

## 2019-08-06 RX ORDER — ENOXAPARIN SODIUM 100 MG/ML
30 INJECTION SUBCUTANEOUS EVERY 24 HOURS
Refills: 0 | Status: DISCONTINUED | OUTPATIENT
Start: 2019-08-06 | End: 2019-08-07

## 2019-08-06 RX ORDER — TIMOLOL 0.5 %
1 DROPS OPHTHALMIC (EYE)
Qty: 0 | Refills: 0 | DISCHARGE

## 2019-08-06 RX ORDER — SODIUM CHLORIDE 9 MG/ML
1000 INJECTION INTRAMUSCULAR; INTRAVENOUS; SUBCUTANEOUS ONCE
Refills: 0 | Status: COMPLETED | OUTPATIENT
Start: 2019-08-06 | End: 2019-08-06

## 2019-08-06 RX ORDER — PREDNISOLONE SODIUM PHOSPHATE 1 %
1 DROPS OPHTHALMIC (EYE)
Qty: 0 | Refills: 0 | DISCHARGE

## 2019-08-06 RX ADMIN — SODIUM CHLORIDE 125 MILLILITER(S): 9 INJECTION INTRAMUSCULAR; INTRAVENOUS; SUBCUTANEOUS at 10:39

## 2019-08-06 RX ADMIN — SODIUM CHLORIDE 500 MILLILITER(S): 9 INJECTION INTRAMUSCULAR; INTRAVENOUS; SUBCUTANEOUS at 09:15

## 2019-08-06 RX ADMIN — ENOXAPARIN SODIUM 30 MILLIGRAM(S): 100 INJECTION SUBCUTANEOUS at 19:10

## 2019-08-06 RX ADMIN — SODIUM CHLORIDE 1000 MILLILITER(S): 9 INJECTION INTRAMUSCULAR; INTRAVENOUS; SUBCUTANEOUS at 10:39

## 2019-08-06 NOTE — H&P ADULT - PROBLEM SELECTOR PLAN 3
-Patient needs AC  -GI prophylaxis not indicated -Patient needs AC and placed on 40mg lovenox.  -GI prophylaxis not indicated -Patient found to have T wave inversions on EKG, first troponin negative. Low suspicion of ACS given absence of active chest pain.  -Patient is asymptomatic and hemodynamically stable.  -Avoid QTc prolonging agents.  -Order repeat EKG.  -Consult cardiology. -Patient came in with hyponatremia of 131.  -Patient received 1L NS in the ED and was placed on NS at 125cc/hr.  -Hyponatremia resolved, and fluids discontinued.

## 2019-08-06 NOTE — ED ADULT NURSE NOTE - CHPI ED NUR SYMPTOMS NEG
no deformity/no abrasion/no bleeding/no vomiting/no weakness/no numbness/no tingling/no loss of consciousness/no confusion/no fever

## 2019-08-06 NOTE — H&P ADULT - PROBLEM SELECTOR PLAN 1
-Patient complaining of weakness and decreased appetite for the past 2 months. Weakness likely 2/2 decreased PO intake.  -Order speech and swallow. -Patient is behaving strangely and is incontinent per ED provider note. He is here because of a recent fall and is complaining of generalized weakness and decreased appetite for the past 2 months. Weakness likely 2/2 decreased PO intake.  -Order Head CT to rule out NPH.  -Order PT evaluation and speech and swallow evaluation.  -Order B12, folate level as patient has macrocytic RBCs.  -Contact PCP for collateral to rule out dementia.  -Follow up Utox and RPR. -Patient is behaving strangely and is incontinent per ED provider note. He is here because of a recent fall and is complaining of generalized weakness and decreased appetite for the past 2 months. Weakness likely 2/2 decreased PO intake.  -Order Head CT to rule out NPH.  -Order PT evaluation and speech and swallow evaluation.  -Order B12, folate level as patient has macrocytic RBCs.  -Contact PCP for collateral to rule out dementia.  -Follow up U tox and RPR. -Patient is behaving strangely and is incontinent per ED provider note. He is here because of a recent fall and is complaining of generalized weakness and decreased appetite for the past 2 months. Weakness likely 2/2 decreased PO intake.  -Differential includes: NPH, dementia, B12, drug induced, Syphilis  -Order Head CT to rule out NPH.  -Order PT evaluation and speech and swallow evaluation.  -Order B12, folate level as patient has macrocytic RBCs.  -Contact PCP for collateral to rule out dementia.  -Follow up U tox and RPR. -Patient is behaving strangely and is incontinent per ED provider note. He is here because of a recent fall and is complaining of generalized weakness and decreased appetite for the past 2 months. Weakness likely 2/2 decreased PO intake.  -Differential includes: NPH, dementia, B12, drug induced, Syphilis  -Order Head CT to rule out NPH.  -Order PT evaluation and speech and bedside swallow.  -Order B12, folate level as patient has macrocytic RBCs.  -Contact PCP for collateral to rule out dementia.  -Follow up U tox and RPR.  -Follow TSH and AM labs.

## 2019-08-06 NOTE — ED PROVIDER NOTE - OBJECTIVE STATEMENT
69 y/o M with no PMHx presents to ED due to weakness and inability to swallow solid food x 2 weeks. Presents today to the ED due to falling off his bed and his caretaker being unable to lift him back up. Denies hitting head and was awake when faling. States that he has had minimal appetite and has been on a liquid diet for the last 2 weeks due to not being able to swallow any solid food and drinking less than usual. Also unable to ambulate, stays in bed, as per caretaker urinates in a cup/on himself. Estimates he has lost over 20lbs in 3 months. He has not seen a doctor in over a year and was unwilling to come recently, but finally convinced. Denies any pain, chest pain, SOB, fevers, chills, or any other acute complaints.

## 2019-08-06 NOTE — H&P ADULT - NSHPREVIEWOFSYSTEMS_GEN_ALL_CORE
REVIEW OF SYSTEMS:    CONSTITUTIONAL: Patient admits to weight loss; denies fevers or chills  EYES/ENT: Patient denies visual changes; denies vertigo or throat pain   NECK: Patient denies pain or stiffness  RESPIRATORY: Patient denies cough, wheezing, hemoptysis; denies shortness of breath  CARDIOVASCULAR: Patient denies chest pain or palpitations  GASTROINTESTINAL: Patient denies abdominal or epigastric pain, nausea, vomiting, or hematemesis, diarrhea or constipation, melena or hematochezia.  GENITOURINARY: Patient denies dysuria or hematuria  NEUROLOGICAL: Patient admits to weakness; denies numbness  All other review of systems is negative unless indicated above. REVIEW OF SYSTEMS:    CONSTITUTIONAL: Patient admits to generalized weakness and weight loss; denies fevers or chills  EYES/ENT: Patient denies visual changes; denies vertigo or throat pain   NECK: Patient denies pain or stiffness  RESPIRATORY: Patient denies cough, wheezing, hemoptysis; denies shortness of breath  CARDIOVASCULAR: Patient denies chest pain or palpitations  GASTROINTESTINAL: Patient denies abdominal or epigastric pain, nausea, vomiting, or hematemesis, diarrhea or constipation, melena or hematochezia.  GENITOURINARY: Patient denies dysuria or hematuria  NEUROLOGICAL: Patient denies numbness  All other review of systems is negative unless indicated above.

## 2019-08-06 NOTE — ED PROVIDER NOTE - CLINICAL SUMMARY MEDICAL DECISION MAKING FREE TEXT BOX
69 y/o F with PMHx of seizures presents to ED due to weakness and inability to swallow solid food x 2 weeks. Cannot ambulate by himself, urinates in bed. Has trouble swallowing solid foods, says possibly due to loss of appetite. Presents to ED today due to falling out of bed and caretaker unable to pick him up. On exam, appears weak and cachexic. Case management aware of patient condition. Will organize patient admission. Needs PT and GI consult. May need CHITO. Plan for labs, EKG, X-ray, and medications.

## 2019-08-06 NOTE — H&P ADULT - NSHPLABSRESULTS_GEN_ALL_CORE
LABS:                         17.3   8.90  )-----------( 203      ( 06 Aug 2019 09:27 )             51.3     08-06    131<L>  |  92<L>  |  12  ----------------------------<  90  4.3   |  22  |  0.87    Ca    9.1      06 Aug 2019 09:27    TPro  7.1  /  Alb  3.6  /  TBili  1.0  /  DBili  x   /  AST  25  /  ALT  12  /  AlkPhos  54  08-06    PT/INR - ( 06 Aug 2019 09:27 )   PT: 11.7 sec;   INR: 1.03          PTT - ( 06 Aug 2019 09:27 )  PTT:30.7 sec    CARDIAC MARKERS ( 06 Aug 2019 09:27 )  x     / <0.01 ng/mL / x     / x     / x                RADIOLOGY, EKG & ADDITIONAL TESTS: CXR: LABS:                         17.3   8.90  )-----------( 203      ( 06 Aug 2019 09:27 )             51.3     08-06    131<L>  |  92<L>  |  12  ----------------------------<  90  4.3   |  22  |  0.87    Ca    9.1      06 Aug 2019 09:27    TPro  7.1  /  Alb  3.6  /  TBili  1.0  /  DBili  x   /  AST  25  /  ALT  12  /  AlkPhos  54  08-06    PT/INR - ( 06 Aug 2019 09:27 )   PT: 11.7 sec;   INR: 1.03          PTT - ( 06 Aug 2019 09:27 )  PTT:30.7 sec    CARDIAC MARKERS ( 06 Aug 2019 09:27 )  x     / <0.01 ng/mL / x     / x     / x                RADIOLOGY, EKG & ADDITIONAL TESTS:   < from: Xray Chest 1 View- PORTABLE-Urgent (08.06.19 @ 10:21) >    Heart size within normal limits, thoracic aortic   calcification. Lungs,, mediastinum and thorax are unremarkable.

## 2019-08-06 NOTE — H&P ADULT - PROBLEM SELECTOR PLAN 4
F: NS at 125cc/hr  E: Replete for K<4 and Mg<2  N:   Disposition to [facility] F: NS at 125cc/hr  E: Replete for K<4 and Mg<2  N: followup speech and swallow results  Disposition to [facility] -Patient needs AC and placed on 40mg lovenox.  -GI prophylaxis not indicated -Patient needs AC and placed on 30mg lovenox.  -GI prophylaxis not indicated.

## 2019-08-06 NOTE — ED PROVIDER NOTE - MUSCULOSKELETAL, MLM
Spine appears normal, range of motion is not limited, no muscle or joint tenderness. No bony deformity. No rotation of extremities.

## 2019-08-06 NOTE — H&P ADULT - PROBLEM SELECTOR PLAN 2
-Patient found to have new T wave inversions on EKG, first troponin negative.  -Patient is asymptomatic and hemodynamically stable.  -Consult cardiology or followup outpatient. -Patient found to have T wave inversions on EKG, first troponin negative. Low suspicion of ACS given absence of active chest pain.  -Patient is asymptomatic and hemodynamically stable.  -Avoid QTc prolonging agents.  -Order repeat EKG.  -Consult cardiology. -Patient came in with hyponatremia of 131.  -Patient received 1L NS in the ED and was placed on NS at 125cc/hr.  -Hyponatremia resolved, and fluids discontinued. -Patient found to have T wave inversions on EKG, first troponin negative. Low suspicion of ACS given absence of active chest pain.  -Patient is asymptomatic and hemodynamically stable.  -Avoid QTc prolonging agents.  -Followup repeat EKG and Echocardiogram.

## 2019-08-06 NOTE — H&P ADULT - PROBLEM SELECTOR PLAN 5
1) PCP Contacted on Admission: (Y/N) --> Name & Phone #:  2) Date of Contact with PCP: TBD  3) PCP Contacted at Discharge: TBD  4) Summary of Handoff Given to PCP: TBD   5) Post-Discharge Appointment Date: TBD F: NS at 125cc/hr  E: Replete for K<4 and Mg<2  N: followup speech and swallow results  Disposition to [facility] F: Fluids not indicated at this time  E: Replete for K<4 and Mg<2  N: Regular diet  Disposition to [facility]

## 2019-08-06 NOTE — ED PROVIDER NOTE - NS_ATTENDINGSCRIBE_ED_ALL_ED
Detail Level: Detailed
Detail Level: Zone
I personally performed the service described in the documentation recorded by the scribe in my presence, and it accurately and completely records my words and actions.

## 2019-08-06 NOTE — ED PROVIDER NOTE - DIAGNOSTIC INTERPRETATION
ER Physician: Simona Rider  INTERPRETATION:  no acute fracture; no soft tissue swelling noted; normal bony alignment.

## 2019-08-06 NOTE — PROVIDER CONTACT NOTE (CHANGE IN STATUS NOTIFICATION) - RECOMMENDATIONS
Pregnancy test negative   pls inform patient     HCG Urine Qualitative Negative Negative      Called patient. No answer.   Left message on answering machine to return call ext [phone] MD Guardado aware. Straight cath performed however resistance met. Suggested urology to come assess and catheterize patient.

## 2019-08-06 NOTE — ED ADULT NURSE NOTE - NSIMPLEMENTINTERV_GEN_ALL_ED
Implemented All Fall Risk Interventions:  [place] to call system. Call bell, personal items and telephone within reach. Instruct patient to call for assistance. Room bathroom lighting operational. Non-slip footwear when patient is off stretcher. Physically safe environment: no spills, clutter or unnecessary equipment. Stretcher in lowest position, wheels locked, appropriate side rails in place. Provide visual cue, wrist band, yellow gown, etc. Monitor gait and stability. Monitor for mental status changes and reorient to person, place, and time. Review medications for side effects contributing to fall risk. Reinforce activity limits and safety measures with patient and family.

## 2019-08-06 NOTE — ED ADULT TRIAGE NOTE - CHIEF COMPLAINT QUOTE
patient's girlfriend called ems because patient rolled out of bed, fell on his right side and hurt his right hip.  No deformity noted.  Patient also verbalized he has not been eating or drinking the past few days.

## 2019-08-06 NOTE — H&P ADULT - NSHPPHYSICALEXAM_GEN_ALL_CORE
VITAL SIGNS:  T(F): 98.5 (08-06-19 @ 13:58), Max: 98.5 (08-06-19 @ 13:57)  HR: 81 (08-06-19 @ 13:58) (81 - 103)  BP: 115/84 (08-06-19 @ 13:58) (89/66 - 115/84)  BP(mean): --  RR: 16 (08-06-19 @ 13:58) (16 - 18)  SpO2: 98% (08-06-19 @ 13:58) (98% - 99%)    PHYSICAL EXAM:    Constitutional: cachectic male, resting comfortably in bed; NAD  HEENT: NC/AT, Left pupil reactive to direct and indirect light, right pupil is irregular due to past eye injury and surgery, EOMI, anicteric sclera, no nasal discharge; uvula midline; DMM  Neck: supple  Respiratory: CTA B/L; no W/R/R  Cardiac: +S1/S2; RRR; no M/R/G  Gastrointestinal: soft, NT/ND; no rebound or guarding  Back: spine midline  Extremities: WWP, no clubbing or cyanosis; no peripheral edema  Musculoskeletal: NROM x4; no joint swelling, tenderness or erythema; muscle strength 5/5 in upper extremities, 5/5 in RLE, 4/5 in LLE  Vascular: 2+ radial, femoral, DP/PT pulses B/L  Neurologic: AAOx3; CNII-XII grossly intact, except in right eye; no focal deficits VITAL SIGNS:  T(F): 98.5 (08-06-19 @ 13:58), Max: 98.5 (08-06-19 @ 13:57)  HR: 81 (08-06-19 @ 13:58) (81 - 103)  BP: 115/84 (08-06-19 @ 13:58) (89/66 - 115/84)  BP(mean): --  RR: 16 (08-06-19 @ 13:58) (16 - 18)  SpO2: 98% (08-06-19 @ 13:58) (98% - 99%)    PHYSICAL EXAM:    Constitutional: cachectic male, resting comfortably in bed; NAD  HEENT: NC/AT, Left pupil reactive to direct and indirect light, right pupil is irregular due to past eye injury and surgery, EOMI, anicteric sclera, no nasal discharge; uvula midline; DMM  Neck: supple  Respiratory: CTA B/L; no W/R/R  Cardiac: +S1/S2; RRR; no M/R/G  Gastrointestinal: soft, NT/ND; no rebound or guarding  Back: spine midline  Extremities: WWP, no clubbing or cyanosis; no peripheral edema  Musculoskeletal: NROM x4; no joint swelling, tenderness or erythema; muscle strength 5/5 in upper extremities, 5/5 in RLE, 4/5 in LLE  Vascular: 2+ radial, femoral, DP/PT pulses B/L  Neurologic: AAOx1 (knows name, but not which hospital or year); CNII-XII grossly intact, except in right eye due to injury VITAL SIGNS:  T(F): 98.5 (08-06-19 @ 13:58), Max: 98.5 (08-06-19 @ 13:57)  HR: 81 (08-06-19 @ 13:58) (81 - 103)  BP: 115/84 (08-06-19 @ 13:58) (89/66 - 115/84)  BP(mean): --  RR: 16 (08-06-19 @ 13:58) (16 - 18)  SpO2: 98% (08-06-19 @ 13:58) (98% - 99%)    PHYSICAL EXAM:    Constitutional: cachectic male, temporal wasting; resting comfortably in bed; NAD  HEENT: NC/AT, Left pupil reactive to direct and indirect light, right pupil is irregular due to past eye injury and surgery, EOMI, anicteric sclera, no nasal discharge; uvula midline; DMM  Neck: supple  Respiratory: CTA B/L; no W/R/R  Cardiac: +S1/S2; RRR; no M/R/G  Gastrointestinal: soft, NT/ND; no rebound or guarding  Back: spine midline  Extremities: WWP, no clubbing or cyanosis; no peripheral edema  Musculoskeletal: NROM x4; no joint swelling, tenderness or erythema; muscle strength 5/5 in upper extremities, 5/5 in RLE, 4/5 in LLE  Vascular: 2+ radial, femoral, DP/PT pulses B/L  Neurologic: AAOx1 (knows name, but not which hospital or year); CNII-XII grossly intact, except in right eye due to injury VITAL SIGNS:  T(F): 98.5 (08-06-19 @ 13:58), Max: 98.5 (08-06-19 @ 13:57)  HR: 81 (08-06-19 @ 13:58) (81 - 103)  BP: 115/84 (08-06-19 @ 13:58) (89/66 - 115/84)  BP(mean): --  RR: 16 (08-06-19 @ 13:58) (16 - 18)  SpO2: 98% (08-06-19 @ 13:58) (98% - 99%)    PHYSICAL EXAM:    Constitutional: cachectic male, temporal wasting; resting comfortably in bed; NAD  HEENT: NC/AT, Left pupil reactive to direct and indirect light, right pupil is irregular due to past eye injury and surgery, EOMI, anicteric sclera, no nasal discharge; uvula midline; DMM  Neck: supple  Respiratory: CTA B/L; no W/R/R  Cardiac: +S1/S2; RRR; no M/R/G  Gastrointestinal: soft, NT/ND; no rebound or guarding  Back: spine midline  Extremities: WWP, no clubbing or cyanosis; no peripheral edema  Musculoskeletal: NROM x4; no joint swelling, tenderness or erythema; muscle strength 5/5 in upper extremities, 5/5 in RLE, 4/5 in LLE  Vascular: 2+ radial, femoral, DP/PT pulses B/L  Neurologic: AAOx2 (knows name and hospital, but not year); CNII-XII grossly intact, except in right eye due to injury VITAL SIGNS:  T(F): 98.5 (08-06-19 @ 13:58), Max: 98.5 (08-06-19 @ 13:57)  HR: 81 (08-06-19 @ 13:58) (81 - 103)  BP: 115/84 (08-06-19 @ 13:58) (89/66 - 115/84)  BP(mean): --  RR: 16 (08-06-19 @ 13:58) (16 - 18)  SpO2: 98% (08-06-19 @ 13:58) (98% - 99%)    PHYSICAL EXAM:    Constitutional: cachectic male, temporal wasting; resting comfortably in bed; NAD  HEENT: NC/AT, Left pupil reactive to direct and indirect light, right pupil is irregular due to past eye injury and surgery, EOMI, anicteric sclera, no nasal discharge; uvula midline; DMM  Neck: supple  Respiratory: CTA B/L; no W/R/R  Cardiac: +S1/S2; RRR; no M/R/G  Gastrointestinal: soft, NT/ND; no rebound or guarding  Back: spine midline  Extremities: WWP, no clubbing or cyanosis; no peripheral edema, hammertoe on right foot  Musculoskeletal: NROM x4; no joint swelling, tenderness or erythema; muscle strength 5/5 in upper extremities, 5/5 in RLE, 4/5 in LLE  Vascular: 2+ radial, femoral, DP/PT pulses B/L  Neurologic: AAOx2 (knows name and hospital, but not year); CNII-XII grossly intact, except in right eye due to injury

## 2019-08-06 NOTE — H&P ADULT - ASSESSMENT
Patient is a 69 y/o M with PMH of seizure presents with weakness and decreased appetite for 2 months. Patient is a 69 y/o M with PMH of seizure presents with generalized weakness and decreased appetite for 2 months.

## 2019-08-06 NOTE — ED ADULT NURSE NOTE - OBJECTIVE STATEMENT
Pt presents s/p fall out of bed. Pt states "I was asleep and rolled out of bed by accident". Pt denies any symptoms prior to fall but endorses right hip pain with movement. Pt endorses increased weakness and inability to get out of bed for weeks, as well as decreased po intake. Pt denies any head strike with fall, no loss of consciousness, no fevers, no lightheadedness, no dizziness, no cp, no sob, no n/v, no changes to bowels, no urinary complaints.

## 2019-08-06 NOTE — H&P ADULT - HISTORY OF PRESENT ILLNESS
Patient is a 67 y/o M with no PMH presents with Patient is a 69 y/o M with PMH of seizure presents with weakness and decreased appetite for 2 months. Patient states that he has not been eating much for the past 2 months because he is not able to taste what he is eating, and has been losing weight. He recently fell off his bed and did not hit his head, but he does not remember what day it was. Patient admits to weakness, but denies all other symptoms. Patient is a poor historian.    In the ED, vitals as follows: T: 98.3 | HR: 103 | BP: 89/66 | RR: 17 | SpO2: 99%   Labs significant for: Hemoglobin 17.3, Na 131, Cl 92,   Imaging: CXR appears clear, pending official read  EKG: normal sinus rhythm with new T wave inversions compared to EKG from last admission    Patient was administered: 1L NS and started on NS at 125cc/hr Patient is a 69 y/o M with PMH of seizure presents with weakness and decreased appetite for 2 months. Patient states that he has not been eating much for the past 2 months because he is not able to taste what he is eating, and has been losing weight. He recently fell off his bed and did not hit his head, but he does not remember what day it was. Patient admits to weakness, but denies all other symptoms. Patient is a poor historian and states he does not take any medication. Per ED provider patient is incontinent.    In the ED, vitals as follows: T: 98.3 | HR: 103 | BP: 89/66 | RR: 17 | SpO2: 99%   Labs significant for: Hemoglobin 17.3, Na 131, Cl 92,   Imaging: CXR appears clear, pending official read  EKG: normal sinus rhythm with new T wave inversions compared to EKG from last admission    Patient was administered: 1L NS and started on NS at 125cc/hr Patient is a 67 y/o M with PMH of seizure presents with weakness and decreased appetite for 2 months. Patient states that he has not been eating much for the past 2 months because he is not able to taste what he is eating, and has been losing weight. He recently fell off his bed and did not hit his head, but he does not remember what day it was. Patient admits to weakness, but denies all other symptoms. Patient is a poor historian and states he does not take any medication. Per ED provider patient is incontinent.    In the ED, vitals as follows: T: 98.3 | HR: 103 | BP: 89/66 | RR: 17 | SpO2: 99%   Labs significant for: Hemoglobin 17.3, Na 131, Cl 92, first troponin negative  Imaging: CXR unremarkable with thoracic aortic calcification  EKG: normal sinus rhythm with T wave inversions    Patient was administered: 1L NS and started on NS at 125cc/hr Patient is a 67 y/o M with PMH of seizure presents with generalized weakness and decreased appetite for 2 months. Patient states that he has not been eating much for the past 2 months because he is not able to taste what he is eating, and has been losing weight. He recently fell off his bed and did not hit his head, but he does not remember what day it was. Patient admits to weakness, but denies all other symptoms. Patient is a poor historian and states he does not take any medication. Per ED provider patient is incontinent.    In the ED, vitals as follows: T: 98.3 | HR: 103 | BP: 89/66 | RR: 17 | SpO2: 99%   Labs significant for: Hemoglobin 17.3, Na 131, Cl 92, first troponin negative  Imaging: CXR unremarkable with thoracic aortic calcification  EKG: normal sinus rhythm with T wave inversions    Patient was administered: 1L NS and started on NS at 125cc/hr

## 2019-08-07 DIAGNOSIS — R33.9 RETENTION OF URINE, UNSPECIFIED: ICD-10-CM

## 2019-08-07 DIAGNOSIS — F10.10 ALCOHOL ABUSE, UNCOMPLICATED: ICD-10-CM

## 2019-08-07 LAB
ANION GAP SERPL CALC-SCNC: 11 MMOL/L — SIGNIFICANT CHANGE UP (ref 5–17)
BUN SERPL-MCNC: 10 MG/DL — SIGNIFICANT CHANGE UP (ref 7–23)
CALCIUM SERPL-MCNC: 8.6 MG/DL — SIGNIFICANT CHANGE UP (ref 8.4–10.5)
CHLORIDE SERPL-SCNC: 101 MMOL/L — SIGNIFICANT CHANGE UP (ref 96–108)
CO2 SERPL-SCNC: 25 MMOL/L — SIGNIFICANT CHANGE UP (ref 22–31)
CREAT SERPL-MCNC: 0.89 MG/DL — SIGNIFICANT CHANGE UP (ref 0.5–1.3)
FOLATE SERPL-MCNC: 3.2 NG/ML — LOW
GLUCOSE SERPL-MCNC: 87 MG/DL — SIGNIFICANT CHANGE UP (ref 70–99)
HCT VFR BLD CALC: 41.9 % — SIGNIFICANT CHANGE UP (ref 39–50)
HCV AB S/CO SERPL IA: 0.05 S/CO — SIGNIFICANT CHANGE UP
HCV AB SERPL-IMP: SIGNIFICANT CHANGE UP
HGB BLD-MCNC: 14.2 G/DL — SIGNIFICANT CHANGE UP (ref 13–17)
MAGNESIUM SERPL-MCNC: 1.9 MG/DL — SIGNIFICANT CHANGE UP (ref 1.6–2.6)
MCHC RBC-ENTMCNC: 33.9 GM/DL — SIGNIFICANT CHANGE UP (ref 32–36)
MCHC RBC-ENTMCNC: 34.7 PG — HIGH (ref 27–34)
MCV RBC AUTO: 102.4 FL — HIGH (ref 80–100)
NRBC # BLD: 0 /100 WBCS — SIGNIFICANT CHANGE UP (ref 0–0)
PHENYTOIN FREE SERPL-MCNC: 1.1 UG/ML — LOW (ref 10–20)
PHOSPHATE SERPL-MCNC: 2.8 MG/DL — SIGNIFICANT CHANGE UP (ref 2.5–4.5)
PLATELET # BLD AUTO: 181 K/UL — SIGNIFICANT CHANGE UP (ref 150–400)
POTASSIUM SERPL-MCNC: 3.7 MMOL/L — SIGNIFICANT CHANGE UP (ref 3.5–5.3)
POTASSIUM SERPL-SCNC: 3.7 MMOL/L — SIGNIFICANT CHANGE UP (ref 3.5–5.3)
RBC # BLD: 4.09 M/UL — LOW (ref 4.2–5.8)
RBC # FLD: 12.6 % — SIGNIFICANT CHANGE UP (ref 10.3–14.5)
SODIUM SERPL-SCNC: 137 MMOL/L — SIGNIFICANT CHANGE UP (ref 135–145)
VIT B12 SERPL-MCNC: 391 PG/ML — SIGNIFICANT CHANGE UP (ref 232–1245)
WBC # BLD: 7.69 K/UL — SIGNIFICANT CHANGE UP (ref 3.8–10.5)
WBC # FLD AUTO: 7.69 K/UL — SIGNIFICANT CHANGE UP (ref 3.8–10.5)

## 2019-08-07 PROCEDURE — 70450 CT HEAD/BRAIN W/O DYE: CPT | Mod: 26

## 2019-08-07 PROCEDURE — 99233 SBSQ HOSP IP/OBS HIGH 50: CPT | Mod: GC

## 2019-08-07 PROCEDURE — 93306 TTE W/DOPPLER COMPLETE: CPT | Mod: 26

## 2019-08-07 RX ORDER — PREGABALIN 225 MG/1
1000 CAPSULE ORAL DAILY
Refills: 0 | Status: DISCONTINUED | OUTPATIENT
Start: 2019-08-07 | End: 2019-08-08

## 2019-08-07 RX ORDER — TAMSULOSIN HYDROCHLORIDE 0.4 MG/1
0.4 CAPSULE ORAL AT BEDTIME
Refills: 0 | Status: DISCONTINUED | OUTPATIENT
Start: 2019-08-07 | End: 2019-08-08

## 2019-08-07 RX ORDER — FOLIC ACID 0.8 MG
1 TABLET ORAL DAILY
Refills: 0 | Status: DISCONTINUED | OUTPATIENT
Start: 2019-08-07 | End: 2019-08-08

## 2019-08-07 RX ORDER — THIAMINE MONONITRATE (VIT B1) 100 MG
100 TABLET ORAL DAILY
Refills: 0 | Status: DISCONTINUED | OUTPATIENT
Start: 2019-08-07 | End: 2019-08-08

## 2019-08-07 RX ORDER — ENOXAPARIN SODIUM 100 MG/ML
40 INJECTION SUBCUTANEOUS EVERY 24 HOURS
Refills: 0 | Status: DISCONTINUED | OUTPATIENT
Start: 2019-08-07 | End: 2019-08-08

## 2019-08-07 RX ADMIN — TAMSULOSIN HYDROCHLORIDE 0.4 MILLIGRAM(S): 0.4 CAPSULE ORAL at 21:26

## 2019-08-07 RX ADMIN — PREGABALIN 1000 MICROGRAM(S): 225 CAPSULE ORAL at 17:26

## 2019-08-07 RX ADMIN — Medication 1 MILLIGRAM(S): at 17:26

## 2019-08-07 RX ADMIN — Medication 100 MILLIGRAM(S): at 17:26

## 2019-08-07 RX ADMIN — Medication 1 TABLET(S): at 17:26

## 2019-08-07 NOTE — CONSULT NOTE ADULT - CONSULT REASON
Patient in urinary retention, difficult clement due to resistance during attempted placement.
Rehab evaluation

## 2019-08-07 NOTE — PROGRESS NOTE ADULT - PROBLEM SELECTOR PLAN 4
pt with 2 month history of decreased appetite, weight loss of unknown amount, and generalized weakness  -f/u PT consult  -f/u outpatient given cachexia

## 2019-08-07 NOTE — PROGRESS NOTE ADULT - PROBLEM SELECTOR PLAN 2
Pt unable to remember last void; per pt's girlfriend, pt has a history of "urinary issues" but is unable to characterize the details of the urinary incontinence (ie frequency, urge, suprapubic pain)  -Olmos placed overnight on 8/6 with initial drainage of 700cc clear urine  -Flomax 0.4mg PO at bedtime  -monitor urine output

## 2019-08-07 NOTE — PROGRESS NOTE ADULT - PROBLEM SELECTOR PLAN 1
Pt noted in ED with strange behavior and incontinence per ED staff; mental status appears to be improving to baseline mentation  AMS likely 2/2 dehydration given UA (+)ketones  -f/u 8/7 CTH  -B12 wnl; folate 3.2  -supplement with B12, folic acid, thiamine, multivitamin  -TSH wnl  -utox (-)  -f/u PT and bedside swallow evaluation

## 2019-08-07 NOTE — PROGRESS NOTE ADULT - SUBJECTIVE AND OBJECTIVE BOX
***NOTE IN PROGRESS***    OVERNIGHT EVENTS:    SUBJECTIVE:    Vital Signs Last 12 Hrs  T(F): 98.9 (19 @ 05:40), Max: 98.9 (19 @ 05:40)  HR: 83 (19 @ 06:11) (83 - 88)  BP: 113/78 (19 @ 06:11) (94/64 - 113/78)  BP(mean): --  RR: 15 (19 @ 05:40) (15 - 15)  SpO2: 100% (19 @ 05:40) (100% - 100%)  I&O's Summary    06 Aug 2019 07:  -  07 Aug 2019 07:00  --------------------------------------------------------  IN: 0 mL / OUT: 1450 mL / NET: -1450 mL    07 Aug 2019 07:01  -  07 Aug 2019 13:40  --------------------------------------------------------  IN: 180 mL / OUT: 0 mL / NET: 180 mL        PHYSICAL EXAM:  Constitutional: NAD, comfortable in bed.  HEENT: NC/AT, PERRLA, EOMI, no conjunctival pallor or scleral icterus, MMM  Neck: Supple, no JVD  Respiratory: Normal rate, rhythm, depth, effort. CTAB. No w/r/r.   Cardiovascular: RRR, normal S1 and S2, no m/r/g.   Gastrointestinal: +BS, soft NTND, no guarding or rebound tenderness, no palpable masses   Extremities: wwp; no cyanosis, clubbing or edema.   Vascular: Pulses equal and strong throughout.   Neurological: AAOx3, no CN deficits, strength and sensation intact throughout.   Skin: No gross skin abnormalities or rashes        LABS:                        14.2   7.69  )-----------( 181      ( 07 Aug 2019 05:56 )             41.9         137  |  101  |  10  ----------------------------<  87  3.7   |  25  |  0.89    Ca    8.6      07 Aug 2019 05:56  Phos  2.8     08-  Mg     1.9         TPro  7.1  /  Alb  3.6  /  TBili  1.0  /  DBili  x   /  AST  25  /  ALT  12  /  AlkPhos  54  08-06    PT/INR - ( 06 Aug 2019 09:27 )   PT: 11.7 sec;   INR: 1.03          PTT - ( 06 Aug 2019 09:27 )  PTT:30.7 sec  Urinalysis Basic - ( 06 Aug 2019 23:05 )    Color: Yellow / Appearance: Clear / S.020 / pH: x  Gluc: x / Ketone: 15 mg/dL  / Bili: Negative / Urobili: 0.2 E.U./dL   Blood: x / Protein: NEGATIVE mg/dL / Nitrite: NEGATIVE   Leuk Esterase: NEGATIVE / RBC: x / WBC x   Sq Epi: x / Non Sq Epi: x / Bacteria: x        RADIOLOGY & ADDITIONAL TESTS:    MEDICATIONS  (STANDING):  enoxaparin Injectable 30 milliGRAM(s) SubCutaneous every 24 hours  folic acid 1 milliGRAM(s) Oral daily  multivitamin 1 Tablet(s) Oral daily  tamsulosin 0.4 milliGRAM(s) Oral at bedtime  thiamine 100 milliGRAM(s) Oral daily    MEDICATIONS  (PRN): ***NOTE IN PROGRESS***    OVERNIGHT EVENTS: Olmos catheter placed by urology with initial drainage of 700cc clear urine. No acute events overnight.  SUBJECTIVE: Patient seen and examined at bedside this morning. Patient is a poor historian due to poor recollection of personal health history. Patient is without any new complaints this morning. Denies headache, chest pain, sob, fever, chills, abdominal pain.    Vital Signs Last 12 Hrs  T(F): 98.9 (19 @ 05:40), Max: 98.9 (19 @ 05:40)  HR: 83 (19 @ 06:11) (83 - 88)  BP: 113/78 (19 @ 06:11) (94/64 - 113/78)  BP(mean): --  RR: 15 (19 @ 05:40) (15 - 15)  SpO2: 100% (19 @ 05:40) (100% - 100%)    I&O's Summary  06 Aug 2019 07:  -  07 Aug 2019 07:00  --------------------------------------------------------  IN: 0 mL / OUT: 1450 mL / NET: -1450 mL    07 Aug 2019 07:  -  07 Aug 2019 13:40  --------------------------------------------------------  IN: 180 mL / OUT: 0 mL / NET: 180 mL    PHYSICAL EXAM:  Constitutional: NAD, sitting up comfortably in bed eating breakfast, AAOx2 to person and place - believes it is November, responds to commands and questions appropriately  HEENT: NC/AT, right pupil with irregular borders given history of eye injury/surgery, left pupil round and reactive to light, no conjunctival pallor or scleral icterus, MMM  Neck: Supple, no JVD  Respiratory: CTA b/l, no w/r/r, breathing comfortably   Cardiovascular: RRR, normal S1 and S2, no m/r/g  Gastrointestinal: +BS throughout, soft NTND throughout, no guarding or rebound tenderness, no palpable masses   Extremities: wwp; no cyanosis, clubbing or edema  Vascular: pulses equal 2+ b/l rp, dp  Neurological: AAOx2 as above, no gross CN deficits, b/l UE strength 5/5, b/l LE strength 4/5  Skin: dry flaky skin on feet b/l, no other gross skin abnormalities or rashes    LABS:                        14.2   7.69  )-----------( 181      ( 07 Aug 2019 05:56 )             41.9     08-07    137  |  101  |  10  ----------------------------<  87  3.7   |  25  |  0.89    Ca    8.6      07 Aug 2019 05:56  Phos  2.8     08-07  Mg     1.9     08-07    TPro  7.1  /  Alb  3.6  /  TBili  1.0  /  DBili  x   /  AST  25  /  ALT  12  /  AlkPhos  54  08-06    PT/INR - ( 06 Aug 2019 09:27 )   PT: 11.7 sec;   INR: 1.03     PTT - ( 06 Aug 2019 09:27 )  PTT:30.7 sec    Urinalysis Basic - ( 06 Aug 2019 23:05 )  Color: Yellow / Appearance: Clear / S.020 / pH: x  Gluc: x / Ketone: 15 mg/dL  / Bili: Negative / Urobili: 0.2 E.U./dL   Blood: x / Protein: NEGATIVE mg/dL / Nitrite: NEGATIVE   Leuk Esterase: NEGATIVE / RBC: x / WBC x   Sq Epi: x / Non Sq Epi: x / Bacteria: x    RADIOLOGY & ADDITIONAL TESTS:  19 CXR: thoracic aorta calcification; otherwise unremarkable  19 CTH: pending report    MEDICATIONS  (STANDING):  enoxaparin Injectable 30 milliGRAM(s) SubCutaneous every 24 hours  folic acid 1 milliGRAM(s) Oral daily  multivitamin 1 Tablet(s) Oral daily  tamsulosin 0.4 milliGRAM(s) Oral at bedtime  thiamine 100 milliGRAM(s) Oral daily    MEDICATIONS  (PRN):  - OVERNIGHT EVENTS: Olmos catheter placed by urology with initial drainage of 700cc clear urine. No acute events overnight.  SUBJECTIVE: Patient seen and examined at bedside this morning. Patient is a poor historian due to poor recollection of personal health history. Patient is without any new complaints this morning. Denies headache, chest pain, sob, fever, chills, abdominal pain.    Vital Signs Last 12 Hrs  T(F): 98.9 (19 @ 05:40), Max: 98.9 (19 @ 05:40)  HR: 83 (19 @ 06:11) (83 - 88)  BP: 113/78 (19 @ 06:11) (94/64 - 113/78)  BP(mean): --  RR: 15 (19 @ 05:40) (15 - 15)  SpO2: 100% (19 @ 05:40) (100% - 100%)    I&O's Summary  06 Aug 2019 07:  -  07 Aug 2019 07:00  --------------------------------------------------------  IN: 0 mL / OUT: 1450 mL / NET: -1450 mL    07 Aug 2019 07:01  -  07 Aug 2019 13:40  --------------------------------------------------------  IN: 180 mL / OUT: 0 mL / NET: 180 mL    PHYSICAL EXAM:  Constitutional: NAD, sitting up comfortably in bed eating breakfast, AAOx2 to person and place - believes it is November, responds to commands and questions appropriately  HEENT: NC/AT, right pupil with irregular borders given history of eye injury/surgery, left pupil round and reactive to light, no conjunctival pallor or scleral icterus, MMM  Neck: Supple, no JVD  Respiratory: CTA b/l, no w/r/r, breathing comfortably   Cardiovascular: RRR, normal S1 and S2, no m/r/g  Gastrointestinal: +BS throughout, soft NTND throughout, no guarding or rebound tenderness, no palpable masses   Extremities: wwp; no cyanosis, clubbing or edema  Vascular: pulses equal 2+ b/l rp, dp  Neurological: AAOx2 as above, no gross CN deficits, b/l UE strength 5/5, b/l LE strength 4/5  Skin: dry flaky skin on feet b/l, no other gross skin abnormalities or rashes    LABS:                        14.2   7.69  )-----------( 181      ( 07 Aug 2019 05:56 )             41.9     08-07    137  |  101  |  10  ----------------------------<  87  3.7   |  25  |  0.89    Ca    8.6      07 Aug 2019 05:56  Phos  2.8     08-07  Mg     1.9     08-07    TPro  7.1  /  Alb  3.6  /  TBili  1.0  /  DBili  x   /  AST  25  /  ALT  12  /  AlkPhos  54  08-06    PT/INR - ( 06 Aug 2019 09:27 )   PT: 11.7 sec;   INR: 1.03     PTT - ( 06 Aug 2019 09:27 )  PTT:30.7 sec    Urinalysis Basic - ( 06 Aug 2019 23:05 )  Color: Yellow / Appearance: Clear / S.020 / pH: x  Gluc: x / Ketone: 15 mg/dL  / Bili: Negative / Urobili: 0.2 E.U./dL   Blood: x / Protein: NEGATIVE mg/dL / Nitrite: NEGATIVE   Leuk Esterase: NEGATIVE / RBC: x / WBC x   Sq Epi: x / Non Sq Epi: x / Bacteria: x    RADIOLOGY & ADDITIONAL TESTS:  19 CXR: thoracic aorta calcification; otherwise unremarkable  19 CTH: pending report    MEDICATIONS  (STANDING):  enoxaparin Injectable 30 milliGRAM(s) SubCutaneous every 24 hours  folic acid 1 milliGRAM(s) Oral daily  multivitamin 1 Tablet(s) Oral daily  tamsulosin 0.4 milliGRAM(s) Oral at bedtime  thiamine 100 milliGRAM(s) Oral daily    MEDICATIONS  (PRN):  - OVERNIGHT EVENTS: Olmos catheter placed by urology with initial drainage of 700cc clear urine. No acute events overnight.  SUBJECTIVE: Patient seen and examined at bedside this morning. Patient is a poor historian due to poor recollection of personal health history. Patient is without any new complaints this morning. Denies headache, chest pain, sob, fever, chills, abdominal pain.    Vital Signs Last 12 Hrs  T(F): 98.9 (19 @ 05:40), Max: 98.9 (19 @ 05:40)  HR: 83 (19 @ 06:11) (83 - 88)  BP: 113/78 (19 @ 06:11) (94/64 - 113/78)  BP(mean): --  RR: 15 (19 @ 05:40) (15 - 15)  SpO2: 100% (19 @ 05:40) (100% - 100%)    I&O's Summary  06 Aug 2019 07:  -  07 Aug 2019 07:00  --------------------------------------------------------  IN: 0 mL / OUT: 1450 mL / NET: -1450 mL    07 Aug 2019 07:01  -  07 Aug 2019 13:40  --------------------------------------------------------  IN: 180 mL / OUT: 0 mL / NET: 180 mL    PHYSICAL EXAM:  Constitutional: NAD, sitting up comfortably in bed eating breakfast, AAOx2 to person and place - believes it is November, responds to commands and questions appropriately  HEENT: NC/AT, right pupil with irregular borders given history of eye injury/surgery, left pupil round and reactive to light, no conjunctival pallor or scleral icterus, MMM  Neck: Supple, no JVD  Respiratory: CTA b/l, no w/r/r, breathing comfortably   Cardiovascular: RRR, normal S1 and S2, no m/r/g  Gastrointestinal: +BS throughout, soft NTND throughout, no guarding or rebound tenderness, no palpable masses   Extremities: wwp; no cyanosis, clubbing or edema  Vascular: pulses equal 2+ b/l rp, dp  Neurological: AAOx2 as above, no gross CN deficits, b/l UE strength 5/5, b/l LE strength 4/5  Skin: dry flaky skin on feet b/l, no other gross skin abnormalities or rashes    LABS:                        14.2   7.69  )-----------( 181      ( 07 Aug 2019 05:56 )             41.9     08-07    137  |  101  |  10  ----------------------------<  87  3.7   |  25  |  0.89    Ca    8.6      07 Aug 2019 05:56  Phos  2.8     08-07  Mg     1.9     08-07    TPro  7.1  /  Alb  3.6  /  TBili  1.0  /  DBili  x   /  AST  25  /  ALT  12  /  AlkPhos  54  08-06    PT/INR - ( 06 Aug 2019 09:27 )   PT: 11.7 sec;   INR: 1.03     PTT - ( 06 Aug 2019 09:27 )  PTT:30.7 sec    Urinalysis Basic - ( 06 Aug 2019 23:05 )  Color: Yellow / Appearance: Clear / S.020 / pH: x  Gluc: x / Ketone: 15 mg/dL  / Bili: Negative / Urobili: 0.2 E.U./dL   Blood: x / Protein: NEGATIVE mg/dL / Nitrite: NEGATIVE   Leuk Esterase: NEGATIVE / RBC: x / WBC x   Sq Epi: x / Non Sq Epi: x / Bacteria: x    RADIOLOGY & ADDITIONAL TESTS:  19 CXR: thoracic aorta calcification; otherwise unremarkable    19 CTH: pending report    19 ECHO: performed given EKG findings of T-wave inversion  1. Normal left and right ventricular size and systolic function.   2. Grade I left ventricular diastolic dysfunction without elevated   filling pressure.   3. Small pericardial effusion without evidence of cardiac tamponade.    MEDICATIONS  (STANDING):  enoxaparin Injectable 30 milliGRAM(s) SubCutaneous every 24 hours  folic acid 1 milliGRAM(s) Oral daily  multivitamin 1 Tablet(s) Oral daily  tamsulosin 0.4 milliGRAM(s) Oral at bedtime  thiamine 100 milliGRAM(s) Oral daily    MEDICATIONS  (PRN):  -

## 2019-08-07 NOTE — CONSULT NOTE ADULT - SUBJECTIVE AND OBJECTIVE BOX
Patient is a 68y old  Male who presents with a chief complaint of Behavioral Changes (07 Aug 2019 13:39)       HPI:  Patient is a 69 y/o M with PMH of seizure presents with generalized weakness and decreased appetite for 2 months. Patient states that he has not been eating much for the past 2 months because he is not able to taste what he is eating, and has been losing weight. He recently fell off his bed and did not hit his head, but he does not remember what day it was. Patient admits to weakness, but denies all other symptoms. Patient is a poor historian and states he does not take any medication. Per ED provider patient is incontinent.    In the ED, vitals as follows: T: 98.3 | HR: 103 | BP: 89/66 | RR: 17 | SpO2: 99%   Labs significant for: Hemoglobin 17.3, Na 131, Cl 92, first troponin negative  Imaging: CXR unremarkable with thoracic aortic calcification  EKG: normal sinus rhythm with T wave inversions    Patient was administered: 1L NS and started on NS at 125cc/hr (06 Aug 2019 14:13)      PAST MEDICAL & SURGICAL HISTORY:  Seizure  History of hip replacement      MEDICATIONS  (STANDING):  cyanocobalamin 1000 MICROGram(s) Oral daily  enoxaparin Injectable 40 milliGRAM(s) SubCutaneous every 24 hours  folic acid 1 milliGRAM(s) Oral daily  multivitamin 1 Tablet(s) Oral daily  tamsulosin 0.4 milliGRAM(s) Oral at bedtime  thiamine 100 milliGRAM(s) Oral daily    MEDICATIONS  (PRN):      Social History per patient:             -  (- )  tobacco,  (- )   IVDA,  (- )  iliicit drug use,  (-1-2 glasses of wine qd )  alcohol           - Occupation:  X           - Home Living Status: lives with his significant other in an elevator accessible apartment building            - Home Care Services:  X    Functional Level Prior to Admission per patient:                     - ADL's:  states he is independent           - ambulates with a walker    FAMILY HISTORY:      CBC Full  -  ( 07 Aug 2019 05:56 )  WBC Count : 7.69 K/uL  RBC Count : 4.09 M/uL  Hemoglobin : 14.2 g/dL  Hematocrit : 41.9 %  Platelet Count - Automated : 181 K/uL  Mean Cell Volume : 102.4 fl  Mean Cell Hemoglobin : 34.7 pg  Mean Cell Hemoglobin Concentration : 33.9 gm/dL  Auto Neutrophil # : x  Auto Lymphocyte # : x  Auto Monocyte # : x  Auto Eosinophil # : x  Auto Basophil # : x  Auto Neutrophil % : x  Auto Lymphocyte % : x  Auto Monocyte % : x  Auto Eosinophil % : x  Auto Basophil % : x          137  |  101  |  10  ----------------------------<  87  3.7   |  25  |  0.89    Ca    8.6      07 Aug 2019 05:56  Phos  2.8       Mg     1.9         TPro  7.1  /  Alb  3.6  /  TBili  1.0  /  DBili  x   /  AST  25  /  ALT  12  /  AlkPhos  54        Urinalysis Basic - ( 06 Aug 2019 23:05 )    Color: Yellow / Appearance: Clear / S.020 / pH: x  Gluc: x / Ketone: 15 mg/dL  / Bili: Negative / Urobili: 0.2 E.U./dL   Blood: x / Protein: NEGATIVE mg/dL / Nitrite: NEGATIVE   Leuk Esterase: NEGATIVE / RBC: x / WBC x   Sq Epi: x / Non Sq Epi: x / Bacteria: x          Radiology:    < from: Xray Chest 1 View- PORTABLE-Urgent (19 @ 10:21) >  EXAM:  XR CHEST PORTABLE URGENT 1V                          PROCEDURE DATE:  2019          INTERPRETATION:  Clinical history/reason for exam: Weakness.    Single frontal view.    Comparison: 2007.    Findings/  impression: Heart size within normal limits, thoracic aortic   calcification. Lungs,, mediastinum and thorax are unremarkable.            Vital Signs Last 24 Hrs  T(C): 37.2 (07 Aug 2019 05:40), Max: 37.2 (07 Aug 2019 05:40)  T(F): 98.9 (07 Aug 2019 05:40), Max: 98.9 (07 Aug 2019 05:40)  HR: 83 (07 Aug 2019 06:11) (83 - 88)  BP: 113/78 (07 Aug 2019 06:11) (94/64 - 152/84)  BP(mean): --  RR: 15 (07 Aug 2019 05:40) (15 - 18)  SpO2: 100% (07 Aug 2019 05:40) (94% - 100%)    REVIEW OF SYSTEMS:    CONSTITUTIONAL:  fatigue  EYES: No eye pain, visual disturbances, or discharge  ENMT:  No difficulty hearing, tinnitus, vertigo; No sinus or throat pain  NECK: No pain or stiffness  BREASTS: No pain, masses, or nipple discharge  RESPIRATORY: No cough, wheezing, chills or hemoptysis; No shortness of breath  CARDIOVASCULAR: No chest pain, palpitations, dizziness, or leg swelling  GASTROINTESTINAL: No abdominal or epigastric pain. No nausea, vomiting, or hematemesis; No diarrhea or constipation. No melena or hematochezia.  GENITOURINARY: No dysuria, frequency, hematuria, or incontinence  NEUROLOGICAL: No headaches, memory loss, loss of strength, numbness, or tremors  SKIN: No itching, burning, rashes, or lesions   LYMPH NODES: No enlarged glands  ENDOCRINE: No heat or cold intolerance; No hair loss  MUSCULOSKELETAL: No joint pain or swelling; No muscle, back, or extremity pain  PSYCHIATRIC: No depression, anxiety, mood swings, or difficulty sleeping  HEME/LYMPH: No easy bruising, or bleeding gums  ALLERGY AND IMMUNOLOGIC: No hives or eczema  VASCULAR: no swelling, erythema      Physical Exam: 69 yo  gentleman lying in semi Chaparro's position, c/o feeling weak    Head: normocephalic, atraumatic    Eyes: right surgical pupil, EOMI, no nystagmus, sclera anicteric    ENT: nasal discharge, uvula midline, no oropharyngeal erythema/exudate    Neck: supple, negative JVD, negative carotid bruits, no thyromegaly    Chest: CTA bilaterally, neg wheeze/ rhonchi/ rales/ crackles/ egophany    Cardiovascular: regular rate and rhythm, neg murmurs/rubs/gallops    Abdomen: soft, non distended, non tender, negative rebound/guarding, normal bowel sounds, neg hepatosplenomegaly    Extremities: WWP, neg cyanosis/clubbing/edema, negative calf tenderness to palpation, negative Luis Alfredo's sign    :     Neurologic Exam:    Alert and oriented x 2  to person, place, November date/year, speech fluent w/o dysarthria    Cranial Nerves:     II:                       right surgical pupil NR, Left round and reactive to light, visual fields intact   III/ IV/VI:             extraocular movements intact, neg nystagmus, neg ptosis  V:                       facial sensation intact, V1-3 normal  VII:                     face symmetric, no droop, normal eye closure and smile  VIII:                    hearing intact to finger rub bilaterally  IX/ X:                 soft palate rise symmetrical  XI:                      head turning, shoulder shrug normal  XII:                     tongue midline    Motor Exam:    Upper Extremities:     RIght:     poor effort > 3+/5               negative drift    Left :    poor effort > 3+/5               negative drift    Lower Extremities:                 Right:      poor effort > 3+/5                 Left:      poor effort > 3+/5                   Sensory:    intact to LT/PP in all UE/LE dermatomes    DTR:            = biceps/     triceps/     brachioradialis                      = patella/   medial hamstring/ankle                      neg clonus                      neg Babinski                        Gait:  not tested        PM&R Impression:    1) deconditioned  2) no focal weakness        Recommendations:    1) Physical therapy focusing on therapeutic exercises, bed mobility/transfer out of bed evaluation, progressive ambulation with assistive devices prn.    2) Anticipated Disposition Plan/Recs: pending functional progress
Patient is a 68y old  Male who presents with a chief complaint of Behavioral Changes (06 Aug 2019 14:13)      HPI:  Patient is a 67 y/o M with PMH of seizure presents with generalized weakness and decreased appetite for 2 months. Patient states that he has not been eating much for the past 2 months because he is not able to taste what he is eating, and has been losing weight. He recently fell off his bed and did not hit his head, but he does not remember what day it was. Patient admits to weakness, but denies all other symptoms. Patient is a poor historian and states he does not take any medication. Per ED provider patient is incontinent.    In the ED, vitals as follows: T: 98.3 | HR: 103 | BP: 89/66 | RR: 17 | SpO2: 99%   Labs significant for: Hemoglobin 17.3, Na 131, Cl 92, first troponin negative  Imaging: CXR unremarkable with thoracic aortic calcification  EKG: normal sinus rhythm with T wave inversions    Patient was administered: 1L NS and started on NS at 125cc/hr (06 Aug 2019 14:13)    ****** NOTE******                  Patient is as stated above,  team consulted due to bladder scan of 600cc and in urinary retention.  Patient does not recall the last time he urinated and does not follow a Urologist. He states he has been having issues starting to urinate but after starting he states his stream increases and feels fully emptied. Patient endorses suprapubic discomfort at moment, states he has to urinate but can't. He denies dysuria, hematuria, n/v/f/c, urge incontinence, overflow incontinence.         Vital Signs Last 24 Hrs  T(C): 36.6 (06 Aug 2019 17:45), Max: 36.9 (06 Aug 2019 13:57)  T(F): 97.9 (06 Aug 2019 17:45), Max: 98.5 (06 Aug 2019 13:57)  HR: 87 (06 Aug 2019 17:45) (81 - 103)  BP: 152/84 (06 Aug 2019 17:45) (89/66 - 152/84)  BP(mean): --  RR: 15 (06 Aug 2019 17:45) (15 - 18)  SpO2: 94% (06 Aug 2019 17:45) (94% - 99%)  I&O's Summary      PE:  Gen: NAD, Alert and oriented x 3.  Abd: Soft nt/nd. Negative CVAT B/L.  : Circumcised phallus, Non tender to palpation.       LABS:                        17.3   8.90  )-----------( 203      ( 06 Aug 2019 09:27 )             51.3     08-06    136  |  99  |  10  ----------------------------<  74  4.8   |  23  |  0.87    Ca    8.6      06 Aug 2019 16:42    TPro  7.1  /  Alb  3.6  /  TBili  1.0  /  DBili  x   /  AST  25  /  ALT  12  /  AlkPhos  54  08-06    PT/INR - ( 06 Aug 2019 09:27 )   PT: 11.7 sec;   INR: 1.03          PTT - ( 06 Aug 2019 09:27 )  PTT:30.7 sec  Cultures      A/P

## 2019-08-07 NOTE — PROGRESS NOTE ADULT - PROBLEM SELECTOR PLAN 3
patient with history of alcohol abuse ~6 reported drinks/day; per girlfriend, pt currently drinks unknown amount of drinks/day  -h/o associated seizures, previously on dilantin 300mg PO qd - patient stopped taking dilantin "a while ago"  -folate 3.6  -folic acid and thiamine supplements as above patient with history of alcohol abuse ~6 reported drinks/day; per girlfriend, pt currently drinks unknown amount of drinks/day; last drink possibly on Monday evening 8/5/19  -h/o associated seizures, previously on dilantin 300mg PO qd - patient stopped taking dilantin "a while ago"  -dilantin level 1.1  -folate 3.6  -folic acid, thiamine and multivitamin supplements as above

## 2019-08-07 NOTE — CONSULT NOTE ADULT - ASSESSMENT
67 yo male with a history of alcohol abuse with associated seizures presented to [facility] ED on 8/6/2019 due to a fall at home and a history of generalized weakness and loss of appetite with weight loss [id number]      Problem/Plan - 1:  ·  Problem: Encephalopathy.     Problem/Plan - 2:  ·  Problem: Urinary retention.     Problem/Plan - 3:  ·  Problem: Alcohol abuse.     Problem/Plan - 4:  ·  Problem: Weakness.     Problem/Plan - 5:  ·  Problem: Nutrition, metabolism, and development symptoms.     Problem/Plan - 6:  Problem: Need for prophylactic measure.    Problem/Plan - 7:  ·  Problem: Transition of care performed with sharing of clinical summary.
Patient is a 68M w/ urinary retention (600cc bladder scan) most likely 2/2 enlarged prostate.   team using sterile technique placed a 14fr coude w/ no resistance.  Patient tolerated procedure well and was able to output 700cc yellow/clear urine.    Recs:  -14fr coude, TOV per primary team  -Would consider Flomax 0.4mg daily

## 2019-08-08 ENCOUNTER — TRANSCRIPTION ENCOUNTER (OUTPATIENT)
Age: 69
End: 2019-08-08

## 2019-08-08 VITALS — WEIGHT: 159.84 LBS

## 2019-08-08 LAB
ANION GAP SERPL CALC-SCNC: 12 MMOL/L — SIGNIFICANT CHANGE UP (ref 5–17)
BUN SERPL-MCNC: 8 MG/DL — SIGNIFICANT CHANGE UP (ref 7–23)
CALCIUM SERPL-MCNC: 8.6 MG/DL — SIGNIFICANT CHANGE UP (ref 8.4–10.5)
CHLORIDE SERPL-SCNC: 104 MMOL/L — SIGNIFICANT CHANGE UP (ref 96–108)
CO2 SERPL-SCNC: 24 MMOL/L — SIGNIFICANT CHANGE UP (ref 22–31)
CREAT SERPL-MCNC: 0.87 MG/DL — SIGNIFICANT CHANGE UP (ref 0.5–1.3)
CULTURE RESULTS: NO GROWTH — SIGNIFICANT CHANGE UP
GLUCOSE SERPL-MCNC: 99 MG/DL — SIGNIFICANT CHANGE UP (ref 70–99)
HCT VFR BLD CALC: 42.3 % — SIGNIFICANT CHANGE UP (ref 39–50)
HGB BLD-MCNC: 14.4 G/DL — SIGNIFICANT CHANGE UP (ref 13–17)
MAGNESIUM SERPL-MCNC: 1.9 MG/DL — SIGNIFICANT CHANGE UP (ref 1.6–2.6)
MCHC RBC-ENTMCNC: 34 GM/DL — SIGNIFICANT CHANGE UP (ref 32–36)
MCHC RBC-ENTMCNC: 35.5 PG — HIGH (ref 27–34)
MCV RBC AUTO: 104.2 FL — HIGH (ref 80–100)
NRBC # BLD: 0 /100 WBCS — SIGNIFICANT CHANGE UP (ref 0–0)
PHOSPHATE SERPL-MCNC: 3.6 MG/DL — SIGNIFICANT CHANGE UP (ref 2.5–4.5)
PLATELET # BLD AUTO: 157 K/UL — SIGNIFICANT CHANGE UP (ref 150–400)
POTASSIUM SERPL-MCNC: 3.3 MMOL/L — LOW (ref 3.5–5.3)
POTASSIUM SERPL-SCNC: 3.3 MMOL/L — LOW (ref 3.5–5.3)
RBC # BLD: 4.06 M/UL — LOW (ref 4.2–5.8)
RBC # FLD: 12.7 % — SIGNIFICANT CHANGE UP (ref 10.3–14.5)
SODIUM SERPL-SCNC: 140 MMOL/L — SIGNIFICANT CHANGE UP (ref 135–145)
SPECIMEN SOURCE: SIGNIFICANT CHANGE UP
T PALLIDUM AB TITR SER: NEGATIVE — SIGNIFICANT CHANGE UP
WBC # BLD: 6.19 K/UL — SIGNIFICANT CHANGE UP (ref 3.8–10.5)
WBC # FLD AUTO: 6.19 K/UL — SIGNIFICANT CHANGE UP (ref 3.8–10.5)

## 2019-08-08 PROCEDURE — 99239 HOSP IP/OBS DSCHRG MGMT >30: CPT | Mod: GC

## 2019-08-08 RX ORDER — TAMSULOSIN HYDROCHLORIDE 0.4 MG/1
1 CAPSULE ORAL
Qty: 0 | Refills: 0 | DISCHARGE
Start: 2019-08-08

## 2019-08-08 RX ORDER — PREGABALIN 225 MG/1
1 CAPSULE ORAL
Qty: 0 | Refills: 0 | DISCHARGE
Start: 2019-08-08

## 2019-08-08 RX ORDER — FOLIC ACID 0.8 MG
1 TABLET ORAL
Qty: 0 | Refills: 0 | DISCHARGE
Start: 2019-08-08

## 2019-08-08 RX ORDER — THIAMINE MONONITRATE (VIT B1) 100 MG
1 TABLET ORAL
Qty: 0 | Refills: 0 | DISCHARGE
Start: 2019-08-08

## 2019-08-08 RX ADMIN — Medication 100 MILLIGRAM(S): at 11:44

## 2019-08-08 RX ADMIN — PREGABALIN 1000 MICROGRAM(S): 225 CAPSULE ORAL at 11:44

## 2019-08-08 RX ADMIN — Medication 1 TABLET(S): at 11:44

## 2019-08-08 RX ADMIN — Medication 1 MILLIGRAM(S): at 11:44

## 2019-08-08 NOTE — PROGRESS NOTE ADULT - SUBJECTIVE AND OBJECTIVE BOX
***NOTE IN PROGRESS**    OVERNIGHT EVENTS:    SUBJECTIVE:    Vital Signs Last 12 Hrs  T(F): 97.8 (19 @ 05:50), Max: 97.8 (19 @ 05:50)  HR: 81 (19 @ 05:50) (81 - 81)  BP: 112/74 (19 @ 05:50) (112/74 - 112/74)  BP(mean): --  RR: 18 (19 @ 05:50) (18 - 18)  SpO2: 97% (19 @ 05:50) (97% - 97%)  I&O's Summary    07 Aug 2019 07:01  -  08 Aug 2019 07:00  --------------------------------------------------------  IN: 360 mL / OUT: 1400 mL / NET: -1040 mL        PHYSICAL EXAM:  Constitutional: NAD, comfortable in bed.  HEENT: NC/AT, PERRLA, EOMI, no conjunctival pallor or scleral icterus, MMM  Neck: Supple, no JVD  Respiratory: Normal rate, rhythm, depth, effort. CTAB. No w/r/r.   Cardiovascular: RRR, normal S1 and S2, no m/r/g.   Gastrointestinal: +BS, soft NTND, no guarding or rebound tenderness, no palpable masses   Extremities: wwp; no cyanosis, clubbing or edema.   Vascular: Pulses equal and strong throughout.   Neurological: AAOx3, no CN deficits, strength and sensation intact throughout.   Skin: No gross skin abnormalities or rashes        LABS:                        14.4   6.19  )-----------( 157      ( 08 Aug 2019 07:14 )             42.3     08-08    140  |  104  |  8   ----------------------------<  99  3.3<L>   |  24  |  0.87    Ca    8.6      08 Aug 2019 07:14  Phos  3.6     08-08  Mg     1.9     -08    TPro  7.1  /  Alb  3.6  /  TBili  1.0  /  DBili  x   /  AST  25  /  ALT  12  /  AlkPhos  54  08-06    PT/INR - ( 06 Aug 2019 09:27 )   PT: 11.7 sec;   INR: 1.03          PTT - ( 06 Aug 2019 09:27 )  PTT:30.7 sec  Urinalysis Basic - ( 06 Aug 2019 23:05 )    Color: Yellow / Appearance: Clear / S.020 / pH: x  Gluc: x / Ketone: 15 mg/dL  / Bili: Negative / Urobili: 0.2 E.U./dL   Blood: x / Protein: NEGATIVE mg/dL / Nitrite: NEGATIVE   Leuk Esterase: NEGATIVE / RBC: x / WBC x   Sq Epi: x / Non Sq Epi: x / Bacteria: x        RADIOLOGY & ADDITIONAL TESTS:    MEDICATIONS  (STANDING):  cyanocobalamin 1000 MICROGram(s) Oral daily  enoxaparin Injectable 40 milliGRAM(s) SubCutaneous every 24 hours  folic acid 1 milliGRAM(s) Oral daily  multivitamin 1 Tablet(s) Oral daily  tamsulosin 0.4 milliGRAM(s) Oral at bedtime  thiamine 100 milliGRAM(s) Oral daily    MEDICATIONS  (PRN): OVERNIGHT EVENTS: No acute events overnight. Patient was monitored for development of alcohol withdrawal given history of alcohol abuse; CIWA score remains at 0.  SUBJECTIVE: Patient seen and examined at bedside this morning. Patient remains AAO x person and place; believes it is 2018- patient re-oriented to correct date. Patient with complaints of a dry throat and requests chocolate milk. Patient without any acute complaints at this time. Denies headache, fever, chills, chest pain, sob, abdominal pain, nausea, vomiting.     Vital Signs Last 12 Hrs  T(F): 97.8 (19 @ 05:50), Max: 97.8 (19 @ 05:50)  HR: 81 (19 @ 05:50) (81 - 81)  BP: 112/74 (19 @ 05:50) (112/74 - 112/74)  RR: 18 (19 @ 05:50) (18 - 18)  SpO2: 97% (19 @ 05:50) (97% - 97%)    I&O's Summary  07 Aug 2019 07:01  -  08 Aug 2019 07:00  --------------------------------------------------------  IN: 360 mL / OUT: 1400 mL / NET: -1040 mL    PHYSICAL EXAM:  Constitutional: NAD, AAO x person and place - thinks it is 2018, resting comfortably in bed  HEENT: NC/AT, L pupil round and reactive to light, R pupil with irregular borders c/w history of traumatic injury/surgery, EOMI, no conjunctival pallor or scleral icterus, MMM  Neck: supple, no JVD  Respiratory: CTA b/l, no w/r/r  Cardiovascular: RRR, normal s1/s2, no m/r/g  Gastrointestinal: +BS, soft NTND, no guarding or rebound tenderness, no palpable masses   Extremities: wwp; no cyanosis, clubbing or edema  Vascular: 2+ rp/dp pulses  Neurological: no gross CN deficits, b/l UE 5/5 strength, b/l LE 4/5 strength  Skin: no gross skin abnormalities or rashes    LABS:             14.4   6.19  )-----------( 157      ( 08 Aug 2019 07:14 )             42.3     140  |  104  |  8   ----------------------------<  99  3.3<L>   |  24  |  0.87    Ca    8.6      08 Aug 2019 07:14  Phos  3.6     08-08  Mg     1.9     08-08    TPro  7.1  /  Alb  3.6  /  TBili  1.0  /  DBili  x   /  AST  25  /  ALT  12  /  AlkPhos  54  08-06  PT/INR - ( 06 Aug 2019 09:27 )   PT: 11.7 sec;   INR: 1.03       PTT - ( 06 Aug 2019 09:27 )  PTT:30.7 sec    Urinalysis Basic - ( 06 Aug 2019 23:05 )  Color: Yellow / Appearance: Clear / S.020 / pH: x  Gluc: x / Ketone: 15 mg/dL  / Bili: Negative / Urobili: 0.2 E.U./dL   Blood: x / Protein: NEGATIVE mg/dL / Nitrite: NEGATIVE   Leuk Esterase: NEGATIVE / RBC: x / WBC x   Sq Epi: x / Non Sq Epi: x / Bacteria: x    RADIOLOGY & ADDITIONAL TESTS:   2019 CT head non-contrast: age-appropriate parenchymal volume loss    MEDICATIONS  (STANDING):  cyanocobalamin 1000 MICROGram(s) Oral daily  enoxaparin Injectable 40 milliGRAM(s) SubCutaneous every 24 hours  folic acid 1 milliGRAM(s) Oral daily  multivitamin 1 Tablet(s) Oral daily  tamsulosin 0.4 milliGRAM(s) Oral at bedtime  thiamine 100 milliGRAM(s) Oral daily    MEDICATIONS  (PRN): OVERNIGHT EVENTS: No acute events overnight. Patient was monitored for development of alcohol withdrawal given history of alcohol abuse; CIWA score remains at 0.  SUBJECTIVE: Patient seen and examined at bedside this morning. Patient remains AAO x person and place; believes it is 2018- patient re-oriented to correct date. Patient with complaints of a mildly dry throat and requests chocolate milk. Patient without any acute complaints at this time. Denies headache, fever, chills, chest pain, sore throat, sob, abdominal pain, nausea, vomiting.     Vital Signs Last 12 Hrs  T(F): 97.8 (19 @ 05:50), Max: 97.8 (19 @ 05:50)  HR: 81 (19 @ 05:50) (81 - 81)  BP: 112/74 (19 @ 05:50) (112/74 - 112/74)  RR: 18 (19 @ 05:50) (18 - 18)  SpO2: 97% (19 @ 05:50) (97% - 97%)    I&O's Summary  07 Aug 2019 07:01  -  08 Aug 2019 07:00  --------------------------------------------------------  IN: 360 mL / OUT: 1400 mL / NET: -1040 mL    PHYSICAL EXAM:  Constitutional: NAD, AAO x person and place - thinks it is 2018, resting comfortably in bed  HEENT: NC/AT, L pupil round and reactive to light, R pupil with irregular borders c/w history of traumatic injury/surgery, EOMI, no conjunctival pallor or scleral icterus, MMM  Neck: supple, no JVD  Respiratory: CTA b/l, no w/r/r  Cardiovascular: RRR, normal s1/s2, no m/r/g  Gastrointestinal: +BS, soft NTND, no guarding or rebound tenderness, no palpable masses   Extremities: wwp; no cyanosis, clubbing or edema  Vascular: 2+ rp/dp pulses  Neurological: no gross CN deficits, b/l UE 5/5 strength, b/l LE 4/5 strength  Skin: no gross skin abnormalities or rashes    LABS:             14.4   6.19  )-----------( 157      ( 08 Aug 2019 07:14 )             42.3     140  |  104  |  8   ----------------------------<  99  3.3<L>   |  24  |  0.87    Ca    8.6      08 Aug 2019 07:14  Phos  3.6     08-08  Mg     1.9     08-08    TPro  7.1  /  Alb  3.6  /  TBili  1.0  /  DBili  x   /  AST  25  /  ALT  12  /  AlkPhos  54  08-06  PT/INR - ( 06 Aug 2019 09:27 )   PT: 11.7 sec;   INR: 1.03       PTT - ( 06 Aug 2019 09:27 )  PTT:30.7 sec    Urinalysis Basic - ( 06 Aug 2019 23:05 )  Color: Yellow / Appearance: Clear / S.020 / pH: x  Gluc: x / Ketone: 15 mg/dL  / Bili: Negative / Urobili: 0.2 E.U./dL   Blood: x / Protein: NEGATIVE mg/dL / Nitrite: NEGATIVE   Leuk Esterase: NEGATIVE / RBC: x / WBC x   Sq Epi: x / Non Sq Epi: x / Bacteria: x    RADIOLOGY & ADDITIONAL TESTS:   2019 CT head non-contrast: age-appropriate parenchymal volume loss    MEDICATIONS  (STANDING):  cyanocobalamin 1000 MICROGram(s) Oral daily  enoxaparin Injectable 40 milliGRAM(s) SubCutaneous every 24 hours  folic acid 1 milliGRAM(s) Oral daily  multivitamin 1 Tablet(s) Oral daily  tamsulosin 0.4 milliGRAM(s) Oral at bedtime  thiamine 100 milliGRAM(s) Oral daily    MEDICATIONS  (PRN):

## 2019-08-08 NOTE — DIETITIAN INITIAL EVALUATION ADULT. - NAME AND PHONE
Samantha Gitlin, RD, CDN, [facility], [id number] or available on [employer]

## 2019-08-08 NOTE — OCCUPATIONAL THERAPY INITIAL EVALUATION ADULT - GENERAL OBSERVATIONS, REHAB EVAL
R hand dominant. Patient cleared for OT by BOZENA Malin. Patient received semi-supine, NAD, +clement, +heplock.

## 2019-08-08 NOTE — DIETITIAN INITIAL EVALUATION ADULT. - PROBLEM SELECTOR PLAN 3
-Patient came in with hyponatremia of 131.  -Patient received 1L NS in the ED and was placed on NS at 125cc/hr.  -Hyponatremia resolved, and fluids discontinued.

## 2019-08-08 NOTE — PROGRESS NOTE ADULT - PROBLEM SELECTOR PLAN 4
pt with 2 month history of decreased appetite, weight loss of unknown amount, and generalized weakness  -f/u PT consult  -f/u outpatient given cachexia pt with 2 month history of decreased appetite, weight loss of unknown amount, and generalized weakness; likely 2/2 poor PO intake related to recent history of alcohol abuse  -PT currently with recs for potential CHITO dispo

## 2019-08-08 NOTE — PHYSICAL THERAPY INITIAL EVALUATION ADULT - IMPAIRMENTS CONTRIBUTING IMPAIRED BED MOBILITY, REHAB EVAL
impaired balance/decrease command follow and difficulty with sequencing/cognition/impaired postural control/decreased strength

## 2019-08-08 NOTE — OCCUPATIONAL THERAPY INITIAL EVALUATION ADULT - MD ORDER
Per chart, Patient is a 69 y/o M with PMH of seizure presents with generalized weakness and decreased appetite for 2 months. Patient states that he has not been eating much for the past 2 months because he is not able to taste what he is eating, and has been losing weight. He recently fell off his bed and did not hit his head, but he does not remember what day it was. Patient admits to weakness, but denies all other symptoms.

## 2019-08-08 NOTE — OCCUPATIONAL THERAPY INITIAL EVALUATION ADULT - VISUAL ACUITY
Patient reports history of diplopia since 8 yo. Patient wears glasses, and to accurately read clock on wall.

## 2019-08-08 NOTE — OCCUPATIONAL THERAPY INITIAL EVALUATION ADULT - PLANNED THERAPY INTERVENTIONS, OT EVAL
strengthening/transfer training/ADL retraining/balance training/bed mobility training/motor coordination training/cognitive, visual perceptual/fine motor coordination training/neuromuscular re-education

## 2019-08-08 NOTE — OCCUPATIONAL THERAPY INITIAL EVALUATION ADULT - ORIENTATION, REHAB EVAL
Patient able to report being in the hospital, however unable to report name of hospital./person/time

## 2019-08-08 NOTE — DISCHARGE NOTE NURSING/CASE MANAGEMENT/SOCIAL WORK - NSDCDPATPORTLINK_GEN_ALL_CORE
You can access the [employer] Patient Portal, offered by [facility], by registering with the following website: [email or web address]

## 2019-08-08 NOTE — DISCHARGE NOTE PROVIDER - HOSPITAL COURSE
67 y/o M with Hx of alcohol withdrawal seizures presented with generalized weakness and decreased appetite for 2 months and fall day of admission. Admitted for metabolic encephalopathy and acute urinary retention. Patient initially He admitted to weakness, but denied other symptoms. He 67 y/o M with Hx of alcohol withdrawal seizures presented with generalized weakness and decreased appetite for 2 months and fall day of admission. Admitted for metabolic encephalopathy and acute urinary retention. Clement was placed by urology which returned 700cc of urine. Patient was started on flomax and clement was maintained. Mental status and confusion improved. Patient found to have significant deconditioning. He was seen by PT who recommended CHITO. He admitted to drinking at home although denied daily use. CIWA consistently 0.         On the day of discharge, the patient was seen and examined. Symptoms improved. Vital signs are stable. Labs and imaging reviewed. Patient is medically optimized and hemodynamically stable. Return precautions discussed, medication teach back done, and importance of physician followup emphasized. The patient verbalized understanding.

## 2019-08-08 NOTE — PHYSICAL THERAPY INITIAL EVALUATION ADULT - SITTING BALANCE: STATIC
fair minus/dangled at EOB ~10 min with CS-CG initially and progressed with mod x 1 with right lateral lean . Pt reports feeling tired and fatgued.

## 2019-08-08 NOTE — PROGRESS NOTE ADULT - PROBLEM SELECTOR PLAN 5
F: no IVF at this time  E: replete prn  N: regular diet
F: no IVF at this time  E: replete prn  N: regular diet

## 2019-08-08 NOTE — OCCUPATIONAL THERAPY INITIAL EVALUATION ADULT - VISUAL ASSESSMENT: TRACKING
Patient able to track in all planes based on functional observation, however patient w/ decreased ability to follow commands to complete full visual assessment.

## 2019-08-08 NOTE — DIETITIAN INITIAL EVALUATION ADULT. - ENERGY NEEDS
Height 69"; .9#; #; 70%IBW  BMI 16.7  IBW used to estimate needs as pt is <80% of IBW. Needs estimated for repletion in older adults; increased for malnutrition

## 2019-08-08 NOTE — PROGRESS NOTE ADULT - PROBLEM SELECTOR PLAN 7
1) PCP Contacted on Admission: (Y/N) --> Name & Phone #:   2) Date of Contact with PCP:   3) PCP Contacted at Discharge: (Y/N)  4) Summary of Handoff Given to PCP:   5) Post-Discharge Appointment Date and Location:
1) PCP Contacted on Admission: (Y/N) --> Name & Phone #:   2) Date of Contact with PCP:   3) PCP Contacted at Discharge: (Y/N)  4) Summary of Handoff Given to PCP:   5) Post-Discharge Appointment Date and Location:

## 2019-08-08 NOTE — DIETITIAN INITIAL EVALUATION ADULT. - ADD RECOMMEND
1. Recommend addition of Ensure Enlive BID (700 kcal, 40g protein, 360 mL free H2O) *Endorsed to MD 2. Continue w/micronutrient supplementation 3. Monitor lytes and replete prn. 4. Bowel regimen per team

## 2019-08-08 NOTE — PROGRESS NOTE ADULT - PROBLEM SELECTOR PLAN 2
Pt unable to remember last void; per pt's girlfriend, pt has a history of "urinary issues" but is unable to characterize the details of the urinary incontinence (ie frequency, urge, suprapubic pain)  -Olmos placed overnight on 8/6 with initial drainage of 700cc clear urine  -Flomax 0.4mg PO at bedtime  -monitor urine output Pt unable to remember last void; per pt and girlfriend, pt has a history of "urinary issues" but is unable to characterize the details of the urinary incontinence (ie frequency, urge, suprapubic pain)  -Clement placed overnight on 8/6 with initial drainage of 700cc clear urine  -Flomax 0.4mg PO at bedtime  -24h clement output: 1,400ml clear yellow urine  -monitor urine output  -holding on TOV for now

## 2019-08-08 NOTE — PHYSICAL THERAPY INITIAL EVALUATION ADULT - PERTINENT HX OF CURRENT PROBLEM, REHAB EVAL
as per chart Patient is a 67 y/o M with PMH of seizure presents with generalized weakness and decreased appetite for 2 months. Patient states that he has not been eating much for the past 2 months because he is not able to taste what he is eating, and has been losing weight. He recently fell off his bed and did not hit his head, but he does not remember what day it was.

## 2019-08-08 NOTE — PHYSICAL THERAPY INITIAL EVALUATION ADULT - LEVEL OF INDEPENDENCE: SIT/STAND, REHAB EVAL
attempted Dep x 2 though unsuccessful 2/2 pt right lateral leaning excessively with prolong sitting activity, To be formally assessed next session as approp.

## 2019-08-08 NOTE — DIETITIAN INITIAL EVALUATION ADULT. - OTHER INFO
67 yo/male with PMHx seizures, presented with 2-3 months of weakness, poor PO intake, wt loss, and dehydration. Pt sen in room, awake, alert, pleasant. He reports lack of appetite for "some time", endorsing that sometimes his girlfriend will cook at home and he will have small bites. Reports that most foods were not appealing 2/2 change in taste. Currently he reports much improved intake with 100% intake at breakfast this AM. No N/V/C/D reported at this time. No BM for a few days though. NKFA. Cipriano difficulty chewing or swallowing. Skin intact pressure-wise. No pain reported. UBW of 153.8# 1.5 years ago; pt endorses at least 10# loss over past 2-3 months. NFPE significant for severe PCM, please see chart note. Pt amenable to ONS. Encouraged PO intake.

## 2019-08-08 NOTE — DISCHARGE NOTE PROVIDER - NSDCCPCAREPLAN_GEN_ALL_CORE_FT
PRINCIPAL DISCHARGE DIAGNOSIS  Diagnosis: Weakness  Assessment and Plan of Treatment: You were found to have significant weakness. This is likely due to deconditioning. you were seen by PT who recommended subacute rehab. Please continue with rehab and follow up with your primary care doctor.      SECONDARY DISCHARGE DIAGNOSES  Diagnosis: Urinary retention  Assessment and Plan of Treatment: You were found to have urinary retention which was likely due to a condition called BPH. A clement catheter was placed which drained 700ccs of urine. You were started on a medication caled flomax to help decrease the size of your prostate. Please have the rehab center perform a trial of void to see if they clement catheter can be removed and follow up with your primary care doctor as well as a urologist for further treatment of your BPH.  Benign prostatic hyperplasia, also called "BPH," is a common problem. any men with BPH have no symptoms at all. When symptoms do occur, they can include needing to urinate often, especially at night, having trouble starting to urinate (this means that you might have to wait or strain before urine will come out), having a weak urine stream, leaking or dribbling urine, feeling as though your bladder is not empty even after you urinate. In rare cases, BPH makes it so a man cannot urinate at all. This is a serious problem. If you cannot urinate at all, call your doctor right away.    Diagnosis: Failure to thrive in adult  Assessment and Plan of Treatment: You were seen by nutrition who recommended you eat a regular diet. Please continue to eat.    Diagnosis: Alcohol abuse  Assessment and Plan of Treatment: Please refrain from drinking alcohol as this can lead to falls. Please take the vitamins prescribed.

## 2019-08-08 NOTE — PHYSICAL THERAPY INITIAL EVALUATION ADULT - GENERAL OBSERVATIONS, REHAB EVAL
Rec'd pt supine inbed with HOB elevated, +bed alarm, +heplock, +clement, cleared for PT and OOB activity by BOZENA Malin. Denies pain, 0/10.

## 2019-08-08 NOTE — PROGRESS NOTE ADULT - ASSESSMENT
69 yo male with a history of alcohol abuse with associated seizures presented to [facility] ED on 8/6/2019 due to a fall at home and a history of generalized weakness and loss of appetite with weight loss [id number]
69 yo male with a history of alcohol abuse with associated seizures presented to [facility] ED on 8/6/2019 due to a fall at home and a history of generalized weakness and loss of appetite with weight loss [id number]    Problem/Plan - 1:  ·  Problem: Encephalopathy.  Plan: Pt noted in ED with strange behavior and incontinence per ED staff; mental status appears to be improving to baseline mentation  AMS likely 2/2 dehydration given UA (+)ketones; possible component of alcohol abuse  -8/7 CTH w/o contrast: age-appropriate parenchymal volume loss  -B12 wnl; folate 3.2  -supplement with B12, folic acid, thiamine, multivitamin  -TSH wnl  -utox (-)  -f/u bedside swallow evaluation.     Problem/Plan - 2:  ·  Problem: Urinary retention.  Plan: Pt unable to remember last void; per pt and girlfriend, pt has a history of "urinary issues" but is unable to characterize the details of the urinary incontinence (ie frequency, urge, suprapubic pain)  -Clement placed overnight on 8/6 with initial drainage of 700cc clear urine  -Flomax 0.4mg PO at bedtime  -24h clement output: 1,400ml clear yellow urine  -monitor urine output  -holding on TOV for now.     Problem/Plan - 3:  ·  Problem: Alcohol abuse.  Plan: patient with history of alcohol abuse ~6 reported drinks/day with noted increase in alcohol intake since intermediate from occupation as a  ~1 year ago; pt and girlfriend are poor historians and are unable to give a clear report of pt's alcohol habits; last drink possibly on Monday evening 8/5/19  -h/o associated seizures, previously on dilantin 300mg PO qd - patient stopped taking dilantin "a while ago"  -dilantin level 1.1  -folate 3.6  -folic acid, thiamine and multivitamin supplements as above  -CIWA score consistent at 0 since admission  -continue to monitor for withdrawal s/s.     Problem/Plan - 4:  ·  Problem: Weakness.  Plan: pt with 2 month history of decreased appetite, weight loss of unknown amount, and generalized weakness; likely 2/2 poor PO intake related to recent history of alcohol abuse    Problem/Plan - 5:  ·  Problem: Nutrition, metabolism, and development symptoms.  Plan: F: no IVF at this time  E: replete prn  N: regular diet.     Problem/Plan - 6:  Problem: Need for prophylactic measure. Plan: DVT: lovenox 40mg sc  GI: none indicated.
69 yo male with a history of alcohol abuse with associated seizures presented to [facility] ED on 8/6/2019 due to a fall at home and a history of generalized weakness and loss of appetite with weight loss [id number]

## 2019-08-08 NOTE — PHYSICAL THERAPY INITIAL EVALUATION ADULT - BALANCE DISTURBANCE, IDENTIFIED IMPAIRMENT CONTRIBUTE, REHAB EVAL
decrase command follow and sequencing, decrease safety awareness./impaired postural control/decreased strength

## 2019-08-08 NOTE — PROGRESS NOTE ADULT - SUBJECTIVE AND OBJECTIVE BOX
Physical Medicine and Rehabilitation Progress Note:    Patient is a 68y old  Male who presents with a chief complaint of Behavioral Changes (08 Aug 2019 09:05)      HPI:  Patient is a 67 y/o M with PMH of seizure presents with generalized weakness and decreased appetite for 2 months. Patient states that he has not been eating much for the past 2 months because he is not able to taste what he is eating, and has been losing weight. He recently fell off his bed and did not hit his head, but he does not remember what day it was. Patient admits to weakness, but denies all other symptoms. Patient is a poor historian and states he does not take any medication. Per ED provider patient is incontinent.    In the ED, vitals as follows: T: 98.3 | HR: 103 | BP: 89/66 | RR: 17 | SpO2: 99%   Labs significant for: Hemoglobin 17.3, Na 131, Cl 92, first troponin negative  Imaging: CXR unremarkable with thoracic aortic calcification  EKG: normal sinus rhythm with T wave inversions    Patient was administered: 1L NS and started on NS at 125cc/hr (06 Aug 2019 14:13)                            14.4   6.19  )-----------( 157      ( 08 Aug 2019 07:14 )             42.3       08-08    140  |  104  |  8   ----------------------------<  99  3.3<L>   |  24  |  0.87    Ca    8.6      08 Aug 2019 07:14  Phos  3.6     08-08  Mg     1.9     08-08      Vital Signs Last 24 Hrs  T(C): 36.6 (08 Aug 2019 05:50), Max: 37.1 (07 Aug 2019 21:00)  T(F): 97.8 (08 Aug 2019 05:50), Max: 98.8 (07 Aug 2019 21:00)  HR: 81 (08 Aug 2019 05:50) (81 - 90)  BP: 112/74 (08 Aug 2019 05:50) (112/74 - 122/80)  BP(mean): --  RR: 18 (08 Aug 2019 05:50) (16 - 18)  SpO2: 97% (08 Aug 2019 05:50) (96% - 100%)    MEDICATIONS  (STANDING):  cyanocobalamin 1000 MICROGram(s) Oral daily  enoxaparin Injectable 40 milliGRAM(s) SubCutaneous every 24 hours  folic acid 1 milliGRAM(s) Oral daily  multivitamin 1 Tablet(s) Oral daily  tamsulosin 0.4 milliGRAM(s) Oral at bedtime  thiamine 100 milliGRAM(s) Oral daily    MEDICATIONS  (PRN):    Currently Undergoing Physical Therapy at bedside.    Functional Status Assessment:      Previous Level of Function:     · Additional Comments	Pt lives at home with girlfriend with elevator access, 1 BRENDEN, denies HR. PTA: states was amb indep in home with SC and in community with RW that girlfriend assisted pt with bring up/down stairs and as per pt reports spouse supervised/assisted as needed. As per NORMAN Zamora girlfriend reported pt gradual weakness and difficulty getting out of bed ~1 month, with decline in amb. requiring increased assist though no specifics provided.	    Cognitive Status Examination:   · Orientation	person; place	  · Level of Consciousness	alert	  · Follows Commands and Answers Questions	100% of the time; able to follow single-step instructions; simple commands requires repetition for task and redirection. Pt difficulty with multistep commands requiring demo, reinstruction, and occasional tactile. Decrease focus to task, easily distracted. Pt able to be redirected though more difficulty with multistep commands.	  · Personal Safety and Judgment	impaired	    Range of Motion Exam:   · Active Range of Motion Examination	bilateral  lower extremity Active ROM was WFL (within functional limits); bilateral upper extremity Active ROM was WFL (within functional limits); except bilat UE shoulder flex ~ degrees, bilat knee extension WFL supine though sitting noted tightness hamstrings ~10 degrees limited bilat knee extension AROM, PROM WFL.	    Manual Muscle Testing:   · Manual Muscle Testing Results	>4-/5 grossly bilat UE/LE except bilat hip flex ~3/5 ; difficulty with multistep commands requireing reinstruction and demo/VCing.	    Bed Mobility: Scooting/Bridging:     · Level of Many	moderate assist (50% patients effort)	  · Physical Assist/Nonphysical Assist	verbal cues; 1 person assist	    Bed Mobility: Sit to Supine:     · Level of Many	moderate assist (50% patients effort)	  · Physical Assist/Nonphysical Assist	verbal cues; 2 person assist	    Bed Mobility: Supine to Sit:     · Level of Many	moderate assist (50% patients effort)	  · Physical Assist/Nonphysical Assist	verbal cues; 1 person assist	    Bed Mobility Analysis:     · Bed Mobility Limitations	impaired ability to control trunk for mobility; decreased ability to use legs for bridging/pushing; decreased ability to use arms for pushing/pulling	  · Impairments Contributing to Impaired Bed Mobility	impaired balance; cognition; decrease command follow and difficulty with sequencing; impaired postural control; decreased strength	    Transfer: Sit to Stand:     · Level of Many	attempted Dep x 2 though unsuccessful 2/2 pt right lateral leaning excessively with prolong sitting activity, To be formally assessed next session as approp.	  · Physical Assist/Nonphysical Assist	verbal cues; 2 person assist	  · Assistive Device	rolling walker	    Sit/Stand Transfer Safety Analysis:     · Transfer Safety Concerns Noted	decreased safety awareness; decreased sequencing ability; decreased weight-shifting ability	  · Impairments Contributing to Impaired Transfers	impaired balance; cognition; decreased strength; impaired postural control; attempted Dep x 2 though unsuccessful 2/2 pt right lateral leaning excessively with prolong sitting activity, To be formally assessed next session as approp.	    Gait Skills:     · Level of Many	TBA	    Balance Skills Assessment:     · Sitting Balance: Static	fair minus  dangled at EOB ~10 min with CS-CG initially and progressed with mod x 1 with right lateral lean . Pt reports feeling tired and fatgued.	  · Sitting Balance: Dynamic	poor balance	  · Sit-to-Stand Balance	poor minus	  · Systems Impairment Contributing to Balance Disturbance	cognitive; musculoskeletal	  · Identified Impairments Contributing to Balance Disturbance	impaired postural control; decreased strength; decrase command follow and sequencing, decrease safety awareness.	    Sensory Examination:   Sensory Examination:    Grossly Intact:   · Gross Sensory Examination	Grossly Intact; Left UE; Right UE; Left LE; Right LE; grossly intact	      Fine Motor Coordination:   Fine Motor Coordination Examination:    Fine Motor Coordination:   · Left Hand Thumb/Finger Opposition Skills	normal performance	  · Right Hand Thumb/Finger Opposition Skills	normal performance	    Clinical Impressions:   · Criteria for Skilled Therapeutic Interventions	impairments found; anticipated discharge recommendation; therapy frequency; rehab potential	  · Impairments Found (describe specific impairments)	aerobic capacity/endurance; gait, locomotion, and balance; posture; poor safety awareness	  · Rehab Potential	good, to achieve stated therapy goals	  · Therapy Frequency	2-3x/week	        PM&R Impression: as above    Disposition Plan Recommendations: subacute rehab placement

## 2019-08-08 NOTE — PHYSICAL THERAPY INITIAL EVALUATION ADULT - FOLLOWS COMMANDS/ANSWERS QUESTIONS, REHAB EVAL
100% of the time/simple commands requires repetition for task and redirection. Pt difficulty with multistep commands requiring demo, reinstruction, and occasional tactile. Decrease focus to task, easily distracted. Pt able to be redirected though more difficulty with multistep commands./able to follow single-step instructions

## 2019-08-08 NOTE — DIETITIAN INITIAL EVALUATION ADULT. - PROBLEM SELECTOR PLAN 2
-Patient found to have T wave inversions on EKG, first troponin negative. Low suspicion of ACS given absence of active chest pain.  -Patient is asymptomatic and hemodynamically stable.  -Avoid QTc prolonging agents.  -Followup repeat EKG and Echocardiogram.

## 2019-08-08 NOTE — PHYSICAL THERAPY INITIAL EVALUATION ADULT - IMPAIRED TRANSFERS: SIT/STAND, REHAB EVAL
cognition/impaired postural control/impaired balance/attempted Dep x 2 though unsuccessful 2/2 pt right lateral leaning excessively with prolong sitting activity, To be formally assessed next session as approp./decreased strength

## 2019-08-08 NOTE — CHART NOTE - NSCHARTNOTEFT_GEN_A_CORE
Upon Nutritional Assessment by the Registered Dietitian your patient was determined to meet criteria / has evidence of the following diagnosis/diagnoses:          [ ]  Mild Protein Calorie Malnutrition        [ ]  Moderate Protein Calorie Malnutrition        [X ] Severe Protein Calorie Malnutrition        [ ] Unspecified Protein Calorie Malnutrition        [X ] Underweight / BMI <19        [ ] Morbid Obesity / BMI > 40      Findings as based on:  •  Comprehensive nutrition assessment and consultation  Per physical assessment: Muscle Wasting- Temporal [ X  ]  Clavicle/Pectoral [ X ]  Shoulder/Deltoid [   ]  Scapula [   ]  Interosseous [ X  ]  Quadriceps [   ]  Gastrocnemius [  X ]; Fat Wasting- Orbital [  X ]  Buccal [   ]  Triceps [   ]  Rib [  X ]--> Suspect severe PCM 2/2 to physical assessment, inadequate energy intake, and severe significant weight loss of >20% for 1 year      Treatment:    The following diet has been recommended:  Regular diet with Ensure Enlive BID (700 kcal, 40g protein, 360 mL free H2O)   Continue w/current micronutrient supplementation    PROVIDER Section:     By signing this assessment you are acknowledging and agree with the diagnosis/diagnoses assigned by the Registered Dietitian    Comments:

## 2019-08-08 NOTE — PHYSICAL THERAPY INITIAL EVALUATION ADULT - ADDITIONAL COMMENTS
Pt lives at home with girlfriend with elevator access, 1 BRENDEN, denies HR. PTA: states was amb indep in home with SC and in community with RW that girlfriend assisted pt with bring up/down stairs and as per pt reports spouse supervised/assisted as needed. As per NORMAN Zamora girlfriend reported pt gradual weakness and difficulty getting out of bed ~1 month, with decline in amb. requiring increased assist though no specifics provided.

## 2019-08-08 NOTE — PROGRESS NOTE ADULT - ATTENDING COMMENTS
Symptoms and signs are probably due to chronic etoh use-weakness/FTT/metabolic encephalopathy  Started on folic acid as with folic acid deficiency  Monitor for etoh WD, c/w thiamine/MVI  TTE w/ diastolic dysfunction, normal EF  Hyponatremia has resolved  Await PT evaluation
Doing well, feels better today  C/w clement for now and flomax, TOV at [facility]  C/w FA as level is low  F/up RPR  PT --> [facility]  Rest as above  Pt is medically stable for discharge to [facility], d/w SW

## 2019-08-08 NOTE — DIETITIAN INITIAL EVALUATION ADULT. - PROBLEM SELECTOR PLAN 5
F: Fluids not indicated at this time  E: Replete for K<4 and Mg<2  N: Regular diet  Disposition to [facility]

## 2019-08-08 NOTE — DIETITIAN INITIAL EVALUATION ADULT. - PROBLEM SELECTOR PLAN 1
-Patient is behaving strangely and is incontinent per ED provider note. He is here because of a recent fall and is complaining of generalized weakness and decreased appetite for the past 2 months. Weakness likely 2/2 decreased PO intake.  -Differential includes: NPH, dementia, B12, drug induced, Syphilis  -Order Head CT to rule out NPH.  -Order PT evaluation and speech and bedside swallow.  -Order B12, folate level as patient has macrocytic RBCs.  -Contact PCP for collateral to rule out dementia.  -Follow up U tox and RPR.  -Follow TSH and AM labs.

## 2019-08-08 NOTE — OCCUPATIONAL THERAPY INITIAL EVALUATION ADULT - STANDING BALANCE: STATIC
poor minus/patient w/ narrow base of support, able to come to 75% errect position w/ kyphotic posture for approximately 15 seconds.

## 2019-08-08 NOTE — PROGRESS NOTE ADULT - PROBLEM SELECTOR PLAN 3
patient with history of alcohol abuse ~6 reported drinks/day; per girlfriend, pt currently drinks unknown amount of drinks/day; last drink possibly on Monday evening 8/5/19  -h/o associated seizures, previously on dilantin 300mg PO qd - patient stopped taking dilantin "a while ago"  -dilantin level 1.1  -folate 3.6  -folic acid, thiamine and multivitamin supplements as above patient with history of alcohol abuse ~6 reported drinks/day with noted increase in alcohol intake since [facility] from occupation as a  ~1 year ago; pt and girlfriend are poor historians and are unable to give a clear report of pt's alcohol habits; last drink possibly on Monday evening 8/5/19  -h/o associated seizures, previously on dilantin 300mg PO qd - patient stopped taking dilantin "a while ago"  -dilantin level 1.1  -folate 3.6  -folic acid, thiamine and multivitamin supplements as above  -CIWA score consistent at 0 since admission  -continue to monitor for withdrawal s/s

## 2019-08-08 NOTE — PROGRESS NOTE ADULT - PROBLEM SELECTOR PLAN 1
Pt noted in ED with strange behavior and incontinence per ED staff; mental status appears to be improving to baseline mentation  AMS likely 2/2 dehydration given UA (+)ketones  -f/u 8/7 CTH  -B12 wnl; folate 3.2  -supplement with B12, folic acid, thiamine, multivitamin  -TSH wnl  -utox (-)  -f/u PT and bedside swallow evaluation Pt noted in ED with strange behavior and incontinence per ED staff; mental status appears to be improving to baseline mentation  AMS likely 2/2 dehydration given UA (+)ketones; possible component of alcohol abuse  -8/7 CTH w/o contrast: age-appropriate parenchymal volume loss  -B12 wnl; folate 3.2  -supplement with B12, folic acid, thiamine, multivitamin  -TSH wnl  -utox (-)  -PT currently with recs for potential [facility] dispo  -f/u bedside swallow evaluation

## 2019-08-08 NOTE — PHYSICAL THERAPY INITIAL EVALUATION ADULT - MANUAL MUSCLE TESTING RESULTS, REHAB EVAL
>4-/5 grossly bilat UE/LE except bilat hip flex ~3/5 ; difficulty with multistep commands requireing reinstruction and demo/VCing.

## 2019-08-08 NOTE — PHYSICAL THERAPY INITIAL EVALUATION ADULT - ACTIVE RANGE OF MOTION EXAMINATION, REHAB EVAL
bilateral  lower extremity Active ROM was WFL (within functional limits)/bilateral upper extremity Active ROM was WFL (within functional limits)/except bilat UE shoulder flex ~ degrees, bilat knee extension WFL supine though sitting noted tightness hamstrings ~10 degrees limited bilat knee extension AROM, PROM WFL.

## 2019-08-14 DIAGNOSIS — I95.9 HYPOTENSION, UNSPECIFIED: ICD-10-CM

## 2019-08-14 DIAGNOSIS — R64 CACHEXIA: ICD-10-CM

## 2019-08-14 DIAGNOSIS — R56.9 UNSPECIFIED CONVULSIONS: ICD-10-CM

## 2019-08-14 DIAGNOSIS — R94.31 ABNORMAL ELECTROCARDIOGRAM [ECG] [EKG]: ICD-10-CM

## 2019-08-14 DIAGNOSIS — N40.1 BENIGN PROSTATIC HYPERPLASIA WITH LOWER URINARY TRACT SYMPTOMS: ICD-10-CM

## 2019-08-14 DIAGNOSIS — R62.7 ADULT FAILURE TO THRIVE: ICD-10-CM

## 2019-08-14 DIAGNOSIS — G93.41 METABOLIC ENCEPHALOPATHY: ICD-10-CM

## 2019-08-14 DIAGNOSIS — F10.10 ALCOHOL ABUSE, UNCOMPLICATED: ICD-10-CM

## 2019-08-14 DIAGNOSIS — R33.9 RETENTION OF URINE, UNSPECIFIED: ICD-10-CM

## 2019-08-14 DIAGNOSIS — E87.1 HYPO-OSMOLALITY AND HYPONATREMIA: ICD-10-CM

## 2019-08-14 DIAGNOSIS — E86.0 DEHYDRATION: ICD-10-CM

## 2019-08-14 DIAGNOSIS — E43 UNSPECIFIED SEVERE PROTEIN-CALORIE MALNUTRITION: ICD-10-CM

## 2019-08-14 DIAGNOSIS — E53.8 DEFICIENCY OF OTHER SPECIFIED B GROUP VITAMINS: ICD-10-CM

## 2019-09-15 ENCOUNTER — INPATIENT (INPATIENT)
Facility: HOSPITAL | Age: 69
LOS: 1 days | Discharge: NURSING, REHAB OR LONG-TERM CARE | DRG: 555 | End: 2019-09-17
Attending: INTERNAL MEDICINE | Admitting: INTERNAL MEDICINE
Payer: MEDICARE

## 2019-09-15 VITALS
SYSTOLIC BLOOD PRESSURE: 142 MMHG | HEART RATE: 84 BPM | DIASTOLIC BLOOD PRESSURE: 65 MMHG | TEMPERATURE: 98 F | RESPIRATION RATE: 16 BRPM | OXYGEN SATURATION: 96 % | WEIGHT: 130.07 LBS

## 2019-09-15 DIAGNOSIS — M48.00 SPINAL STENOSIS, SITE UNSPECIFIED: ICD-10-CM

## 2019-09-15 DIAGNOSIS — Z87.898 PERSONAL HISTORY OF OTHER SPECIFIED CONDITIONS: ICD-10-CM

## 2019-09-15 DIAGNOSIS — F10.10 ALCOHOL ABUSE, UNCOMPLICATED: ICD-10-CM

## 2019-09-15 DIAGNOSIS — E53.8 DEFICIENCY OF OTHER SPECIFIED B GROUP VITAMINS: ICD-10-CM

## 2019-09-15 DIAGNOSIS — Z91.89 OTHER SPECIFIED PERSONAL RISK FACTORS, NOT ELSEWHERE CLASSIFIED: ICD-10-CM

## 2019-09-15 DIAGNOSIS — Z96.649 PRESENCE OF UNSPECIFIED ARTIFICIAL HIP JOINT: Chronic | ICD-10-CM

## 2019-09-15 DIAGNOSIS — R53.1 WEAKNESS: ICD-10-CM

## 2019-09-15 DIAGNOSIS — Z29.9 ENCOUNTER FOR PROPHYLACTIC MEASURES, UNSPECIFIED: ICD-10-CM

## 2019-09-15 LAB
ALBUMIN SERPL ELPH-MCNC: 3 G/DL — LOW (ref 3.3–5)
ALP SERPL-CCNC: 58 U/L — SIGNIFICANT CHANGE UP (ref 40–120)
ALT FLD-CCNC: 11 U/L — SIGNIFICANT CHANGE UP (ref 10–45)
ANION GAP SERPL CALC-SCNC: 8 MMOL/L — SIGNIFICANT CHANGE UP (ref 5–17)
APPEARANCE UR: CLEAR — SIGNIFICANT CHANGE UP
APTT BLD: 30.9 SEC — SIGNIFICANT CHANGE UP (ref 27.5–36.3)
AST SERPL-CCNC: 20 U/L — SIGNIFICANT CHANGE UP (ref 10–40)
BACTERIA # UR AUTO: PRESENT /HPF
BASOPHILS # BLD AUTO: 0.04 K/UL — SIGNIFICANT CHANGE UP (ref 0–0.2)
BASOPHILS NFR BLD AUTO: 0.6 % — SIGNIFICANT CHANGE UP (ref 0–2)
BILIRUB SERPL-MCNC: 0.2 MG/DL — SIGNIFICANT CHANGE UP (ref 0.2–1.2)
BILIRUB UR-MCNC: NEGATIVE — SIGNIFICANT CHANGE UP
BUN SERPL-MCNC: 10 MG/DL — SIGNIFICANT CHANGE UP (ref 7–23)
CALCIUM SERPL-MCNC: 8.7 MG/DL — SIGNIFICANT CHANGE UP (ref 8.4–10.5)
CHLORIDE SERPL-SCNC: 100 MMOL/L — SIGNIFICANT CHANGE UP (ref 96–108)
CO2 SERPL-SCNC: 27 MMOL/L — SIGNIFICANT CHANGE UP (ref 22–31)
COLOR SPEC: YELLOW — SIGNIFICANT CHANGE UP
CREAT SERPL-MCNC: 0.63 MG/DL — SIGNIFICANT CHANGE UP (ref 0.5–1.3)
DIFF PNL FLD: ABNORMAL
EOSINOPHIL # BLD AUTO: 0.28 K/UL — SIGNIFICANT CHANGE UP (ref 0–0.5)
EOSINOPHIL NFR BLD AUTO: 4 % — SIGNIFICANT CHANGE UP (ref 0–6)
EPI CELLS # UR: SIGNIFICANT CHANGE UP /HPF (ref 0–5)
ETHANOL SERPL-MCNC: <10 MG/DL — SIGNIFICANT CHANGE UP (ref 0–10)
GLUCOSE SERPL-MCNC: 104 MG/DL — HIGH (ref 70–99)
GLUCOSE UR QL: NEGATIVE — SIGNIFICANT CHANGE UP
HCT VFR BLD CALC: 40.1 % — SIGNIFICANT CHANGE UP (ref 39–50)
HGB BLD-MCNC: 13.5 G/DL — SIGNIFICANT CHANGE UP (ref 13–17)
IMM GRANULOCYTES NFR BLD AUTO: 0.4 % — SIGNIFICANT CHANGE UP (ref 0–1.5)
INR BLD: 1.05 — SIGNIFICANT CHANGE UP (ref 0.88–1.16)
KETONES UR-MCNC: NEGATIVE — SIGNIFICANT CHANGE UP
LEUKOCYTE ESTERASE UR-ACNC: NEGATIVE — SIGNIFICANT CHANGE UP
LYMPHOCYTES # BLD AUTO: 1.59 K/UL — SIGNIFICANT CHANGE UP (ref 1–3.3)
LYMPHOCYTES # BLD AUTO: 22.8 % — SIGNIFICANT CHANGE UP (ref 13–44)
MAGNESIUM SERPL-MCNC: 2 MG/DL — SIGNIFICANT CHANGE UP (ref 1.6–2.6)
MCHC RBC-ENTMCNC: 32.4 PG — SIGNIFICANT CHANGE UP (ref 27–34)
MCHC RBC-ENTMCNC: 33.7 GM/DL — SIGNIFICANT CHANGE UP (ref 32–36)
MCV RBC AUTO: 96.2 FL — SIGNIFICANT CHANGE UP (ref 80–100)
MONOCYTES # BLD AUTO: 1.08 K/UL — HIGH (ref 0–0.9)
MONOCYTES NFR BLD AUTO: 15.5 % — HIGH (ref 2–14)
NEUTROPHILS # BLD AUTO: 3.95 K/UL — SIGNIFICANT CHANGE UP (ref 1.8–7.4)
NEUTROPHILS NFR BLD AUTO: 56.7 % — SIGNIFICANT CHANGE UP (ref 43–77)
NITRITE UR-MCNC: NEGATIVE — SIGNIFICANT CHANGE UP
NRBC # BLD: 0 /100 WBCS — SIGNIFICANT CHANGE UP (ref 0–0)
PH UR: 6 — SIGNIFICANT CHANGE UP (ref 5–8)
PLATELET # BLD AUTO: 256 K/UL — SIGNIFICANT CHANGE UP (ref 150–400)
POTASSIUM SERPL-MCNC: 4.7 MMOL/L — SIGNIFICANT CHANGE UP (ref 3.5–5.3)
POTASSIUM SERPL-SCNC: 4.7 MMOL/L — SIGNIFICANT CHANGE UP (ref 3.5–5.3)
PROT SERPL-MCNC: 6.7 G/DL — SIGNIFICANT CHANGE UP (ref 6–8.3)
PROT UR-MCNC: NEGATIVE MG/DL — SIGNIFICANT CHANGE UP
PROTHROM AB SERPL-ACNC: 11.9 SEC — SIGNIFICANT CHANGE UP (ref 10–12.9)
RBC # BLD: 4.17 M/UL — LOW (ref 4.2–5.8)
RBC # FLD: 13.6 % — SIGNIFICANT CHANGE UP (ref 10.3–14.5)
RBC CASTS # UR COMP ASSIST: < 5 /HPF — SIGNIFICANT CHANGE UP
SODIUM SERPL-SCNC: 135 MMOL/L — SIGNIFICANT CHANGE UP (ref 135–145)
SP GR SPEC: 1.01 — SIGNIFICANT CHANGE UP (ref 1–1.03)
UROBILINOGEN FLD QL: 0.2 E.U./DL — SIGNIFICANT CHANGE UP
WBC # BLD: 6.97 K/UL — SIGNIFICANT CHANGE UP (ref 3.8–10.5)
WBC # FLD AUTO: 6.97 K/UL — SIGNIFICANT CHANGE UP (ref 3.8–10.5)
WBC UR QL: < 5 /HPF — SIGNIFICANT CHANGE UP

## 2019-09-15 PROCEDURE — 72128 CT CHEST SPINE W/O DYE: CPT | Mod: 26

## 2019-09-15 PROCEDURE — 99222 1ST HOSP IP/OBS MODERATE 55: CPT | Mod: GC

## 2019-09-15 PROCEDURE — 72131 CT LUMBAR SPINE W/O DYE: CPT | Mod: 26

## 2019-09-15 PROCEDURE — 99285 EMERGENCY DEPT VISIT HI MDM: CPT

## 2019-09-15 PROCEDURE — 71045 X-RAY EXAM CHEST 1 VIEW: CPT | Mod: 26

## 2019-09-15 PROCEDURE — 70450 CT HEAD/BRAIN W/O DYE: CPT | Mod: 26

## 2019-09-15 RX ORDER — FOLIC ACID 0.8 MG
1 TABLET ORAL DAILY
Refills: 0 | Status: DISCONTINUED | OUTPATIENT
Start: 2019-09-15 | End: 2019-09-17

## 2019-09-15 RX ORDER — ENOXAPARIN SODIUM 100 MG/ML
40 INJECTION SUBCUTANEOUS EVERY 24 HOURS
Refills: 0 | Status: DISCONTINUED | OUTPATIENT
Start: 2019-09-15 | End: 2019-09-17

## 2019-09-15 RX ORDER — PREGABALIN 225 MG/1
1000 CAPSULE ORAL DAILY
Refills: 0 | Status: DISCONTINUED | OUTPATIENT
Start: 2019-09-15 | End: 2019-09-17

## 2019-09-15 RX ORDER — THIAMINE MONONITRATE (VIT B1) 100 MG
100 TABLET ORAL DAILY
Refills: 0 | Status: DISCONTINUED | OUTPATIENT
Start: 2019-09-15 | End: 2019-09-17

## 2019-09-15 RX ORDER — TAMSULOSIN HYDROCHLORIDE 0.4 MG/1
0.4 CAPSULE ORAL AT BEDTIME
Refills: 0 | Status: DISCONTINUED | OUTPATIENT
Start: 2019-09-15 | End: 2019-09-17

## 2019-09-15 RX ADMIN — TAMSULOSIN HYDROCHLORIDE 0.4 MILLIGRAM(S): 0.4 CAPSULE ORAL at 22:00

## 2019-09-15 NOTE — ED ADULT NURSE NOTE - NSIMPLEMENTINTERV_GEN_ALL_ED
Implemented All Fall with Harm Risk Interventions:  [place]ector to call system. Call bell, personal items and telephone within reach. Instruct patient to call for assistance. Room bathroom lighting operational. Non-slip footwear when patient is off stretcher. Physically safe environment: no spills, clutter or unnecessary equipment. Stretcher in lowest position, wheels locked, appropriate side rails in place. Provide visual cue, wrist band, yellow gown, etc. Monitor gait and stability. Monitor for mental status changes and reorient to person, place, and time. Review medications for side effects contributing to fall risk. Reinforce activity limits and safety measures with patient and family. Provide visual clues: red socks.

## 2019-09-15 NOTE — ED ADULT NURSE REASSESSMENT NOTE - NS ED NURSE REASSESS COMMENT FT1
Patient scheduled for admission to regional service. Patient resting comfortably in stretcher with no c/o voiced. Pt A+OX 4 with friends at bedside. Updated on POC. Continue to monitor.

## 2019-09-15 NOTE — ED ADULT NURSE NOTE - OBJECTIVE STATEMENT
pt received sitting in bed, A&O x 3. hx etoh abuse, hyponatremia, multiple UTI. per pt friends, patient was seen admitted to [facility] x 1 month ago for decrease in ADL status. pt was recently d/c to home but pt unable to ambulate. Skin is intact. pt friends called EMS to bring pt for evaluation. pt non-ambulatory at this time, secondary to generalized chronic weakness. NAD noted, will continue to monitor.

## 2019-09-15 NOTE — H&P ADULT - PROBLEM SELECTOR PLAN 6
1) PCP Contacted on Admission: (Y/N) --> Name & Phone #:  2) Date of Contact with PCP:  3) PCP Contacted at Discharge: (Y/N, N/A)  4) Summary of Handoff Given to PCP:   5) Post-Discharge Appointment Date and Location: F: No IVF  E: Replete K>4 Mag>2  N: Regular Diet  DVT: Lovenox 40mg Q24H  GI: not needed  Dispo: [facility] Pt had a urinary retention on previous admission and was discharged with Olmos which was removed at rehab. Pt is urinating on his own.  - c/w Flomax

## 2019-09-15 NOTE — H&P ADULT - PROBLEM SELECTOR PLAN 7
1) PCP Contacted on Admission: (Y/N) --> Name & Phone #:  2) Date of Contact with PCP:  3) PCP Contacted at Discharge: (Y/N, N/A)  4) Summary of Handoff Given to PCP:   5) Post-Discharge Appointment Date and Location: F: No IVF  E: Replete K>4 Mag>2  N: Regular Diet  DVT: Lovenox 40mg Q24H  GI: not needed  Dispo: [facility]

## 2019-09-15 NOTE — H&P ADULT - ASSESSMENT
69 yo with ho of alcohol withdrawal seizures, metabolic encephalopathy, hyponatremia and UTI w urinary retention sent to [facility] one month ago after significant deconditioning and inability to walk. 67 yo with ho of alcohol withdrawal seizures, metabolic encephalopathy, hyponatremia and UTI w urinary retention sent to [facility] one month ago after significant deconditioning and inability to walk. Pt admitted to [facility] for weakness and rehab placement.

## 2019-09-15 NOTE — H&P ADULT - NSHPLABSRESULTS_GEN_ALL_CORE
.  LABS:                         13.5   6.97  )-----------( 256      ( 15 Sep 2019 17:05 )             40.1     09-15    135  |  100  |  10  ----------------------------<  104<H>  4.7   |  27  |  0.63    Ca    8.7      15 Sep 2019 17:05  Mg     2.0     09-15    TPro  6.7  /  Alb  3.0<L>  /  TBili  0.2  /  DBili  x   /  AST  20  /  ALT  11  /  AlkPhos  58  09-15    PT/INR - ( 15 Sep 2019 17:05 )   PT: 11.9 sec;   INR: 1.05          PTT - ( 15 Sep 2019 17:05 )  PTT:30.9 sec  Urinalysis Basic - ( 15 Sep 2019 18:33 )    Color: Yellow / Appearance: Clear / S.010 / pH: x  Gluc: x / Ketone: NEGATIVE  / Bili: Negative / Urobili: 0.2 E.U./dL   Blood: x / Protein: NEGATIVE mg/dL / Nitrite: NEGATIVE   Leuk Esterase: NEGATIVE / RBC: < 5 /HPF / WBC < 5 /HPF   Sq Epi: x / Non Sq Epi: 0-5 /HPF / Bacteria: Present /HPF                RADIOLOGY, EKG & ADDITIONAL TESTS: Reviewed.

## 2019-09-15 NOTE — ED PROVIDER NOTE - CLINICAL SUMMARY MEDICAL DECISION MAKING FREE TEXT BOX
69 yo with , arrived from [place] today as family concerned pt unsafe discharge and also concerned pt's weakness has not been fully evaluated, on initial evaluation I am also concerned pt was unsafe discharge, pt's girlfriend has spinal stenosis and is clearly unable to lift him, pt is significant fall risk, due to reported visual hallucinations and persistent weakness will evaluate further for recurrent metabolic abnormalities, for infection , for intracranial pathology, leg weakness possibly secondary to significant deconditioning however consider further pathology, consider remote CVA , consider spinal pathology, will CT in ER, as no back pain and weakness is not new pt can have further MR imaging as inpt if necessary, pt in need of inpt neurology consultation and likely nursing home placement if no treatable pathology found. 67 yo with , arrived from [place] today as family concerned pt unsafe discharge and also concerned pt's weakness has not been fully evaluated, on initial evaluation I am also concerned pt was unsafe discharge, pt's girlfriend has spinal stenosis and is clearly unable to lift him, pt is significant fall risk, due to reported visual hallucinations and persistent weakness will evaluate further for recurrent metabolic abnormalities, for infection , for intracranial pathology, leg weakness possibly secondary to significant deconditioning however consider further pathology, consider remote CVA , consider spinal pathology, will CT in ER, as no back pain and weakness is not new pt can have further MR imaging as inpt if necessary, pt in need of inpt neurology consultation and likely nursing home placement if no treatable pathology found.  In addition high suspician for possible Wernicke's Korsakoff syndrome as pt with ho of alcoholism , on lab review pt had chronic elevated MCV , consider vitamin B / thiamine deficiency.

## 2019-09-15 NOTE — H&P ADULT - NSHPSOCIALHISTORY_GEN_ALL_CORE
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

## 2019-09-15 NOTE — H&P ADULT - PROBLEM SELECTOR PLAN 5
F: No IVF  E: Replete K>4 Mag>2  N: Regular Diet  DVT: Lovenox 40mg Q24H  GI: not needed  Dispo: [facility] Pt had a urinary retention on previous admission and was discharged with Olmos which was removed at rehab. Pt is urinating on his own.  - c/w Flomax Pt with hx of EtOH abuse, has not drank in 1 month. 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  - blood alcohol level negative on admission  - no

## 2019-09-15 NOTE — H&P ADULT - NSHPPHYSICALEXAM_GEN_ALL_CORE
.  VITAL SIGNS:  T(C): 36.8 (09-15-19 @ 16:14), Max: 36.8 (09-15-19 @ 16:14)  T(F): 98.3 (09-15-19 @ 16:14), Max: 98.3 (09-15-19 @ 16:14)  HR: 84 (09-15-19 @ 16:14) (84 - 84)  BP: 142/65 (09-15-19 @ 16:14) (142/65 - 142/65)  BP(mean): --  RR: 16 (09-15-19 @ 16:14) (16 - 16)  SpO2: 96% (09-15-19 @ 16:14) (96% - 96%)  Wt(kg): --    PHYSICAL EXAM:    Constitutional: WDWN resting comfortably in bed; NAD  Head: NC/AT  Eyes: PERRL, EOMI, anicteric sclera  ENT: no nasal discharge; uvula midline, no oropharyngeal erythema or exudates; MMM  Neck: supple; no JVD or thyromegaly  Respiratory: CTA B/L; no W/R/R, no retractions  Cardiac: +S1/S2; RRR; no M/R/G; PMI non-displaced  Gastrointestinal: abdomen soft, NT/ND; no rebound or guarding; +BSx4  Genitourinary: normal external genitalia  Back: spine midline, no bony tenderness or step-offs; no CVAT B/L  Extremities: WWP, no clubbing or cyanosis; no peripheral edema  Musculoskeletal: NROM x4; no joint swelling, tenderness or erythema  Vascular: 2+ radial, femoral, DP/PT pulses B/L  Dermatologic: skin warm, dry and intact; no rashes, wounds, or scars  Lymphatic: no submandibular or cervical LAD  Neurologic: AAOx3; CNII-XII grossly intact; no focal deficits  Psychiatric: affect and characteristics of appearance, verbalizations, behaviors are appropriate .  VITAL SIGNS:  T(C): 36.8 (09-15-19 @ 16:14), Max: 36.8 (09-15-19 @ 16:14)  T(F): 98.3 (09-15-19 @ 16:14), Max: 98.3 (09-15-19 @ 16:14)  HR: 84 (09-15-19 @ 16:14) (84 - 84)  BP: 142/65 (09-15-19 @ 16:14) (142/65 - 142/65)  BP(mean): --  RR: 16 (09-15-19 @ 16:14) (16 - 16)  SpO2: 96% (09-15-19 @ 16:14) (96% - 96%)  Wt(kg): --    PHYSICAL EXAM:    Constitutional: WDWN elderly  male resting comfortably in bed; NAD  Head: NC/AT  Eyes: PERRL, EOMI, anicteric sclera  ENT: no nasal discharge; uvula midline, no oropharyngeal erythema or exudates; MMM  Neck: supple; no JVD or thyromegaly  Respiratory: CTA B/L; no W/R/R, no retractions  Cardiac: +S1/S2; RRR; no M/R/G; PMI non-displaced  Gastrointestinal: abdomen soft, NT/ND; no rebound or guarding; +BSx4  Genitourinary: normal external genitalia  Back: spine midline, no bony tenderness or step-offs; no CVAT B/L  Extremities: WWP, no clubbing or cyanosis; no peripheral edema  Musculoskeletal: NROM x4; no joint swelling, tenderness or erythema  Vascular: 2+ radial, femoral, DP/PT pulses B/L  Dermatologic: skin warm, dry and intact; no rashes, wounds, or scars  Lymphatic: no submandibular or cervical LAD  Neurologic: AAOx3; CNII-XII grossly intact; no focal deficits, 5/5 strength in all extremities, sensation in tact, negative finger to nose and heel to shin test. Upon assessing gait, pt was able to position to sit up at the edge of the best but unable to put weight on his legs. Reflex symmetric and present in all levels   Psychiatric: affect and characteristics of appearance, verbalizations, behaviors are appropriate .  VITAL SIGNS:  T(C): 36.8 (09-15-19 @ 16:14), Max: 36.8 (09-15-19 @ 16:14)  T(F): 98.3 (09-15-19 @ 16:14), Max: 98.3 (09-15-19 @ 16:14)  HR: 84 (09-15-19 @ 16:14) (84 - 84)  BP: 142/65 (09-15-19 @ 16:14) (142/65 - 142/65)  BP(mean): --  RR: 16 (09-15-19 @ 16:14) (16 - 16)  SpO2: 96% (09-15-19 @ 16:14) (96% - 96%)  Wt(kg): --    PHYSICAL EXAM:    Constitutional: WDWN elderly  male resting comfortably in bed; NAD  Head: NC/AT  Eyes: PERRL, EOMI, anicteric sclera  ENT: no nasal discharge; uvula midline, no oropharyngeal erythema or exudates; MMM  Neck: supple; no JVD or thyromegaly  Respiratory: CTA B/L; no W/R/R, no retractions  Cardiac: +S1/S2; RRR; no M/R/G; PMI non-displaced  Gastrointestinal: abdomen soft, NT/ND; no rebound or guarding; +BSx4  Genitourinary: normal external genitalia, flat, erythematous rash on L groin   Back: spine midline, no bony tenderness or step-offs; no CVAT B/L  Extremities: WWP, no clubbing or cyanosis; no peripheral edema  Musculoskeletal: NROM x4; no joint swelling, tenderness or erythema  Vascular: 2+ radial, femoral, DP/PT pulses B/L  Dermatologic: skin warm, dry and intact; no rashes, wounds, or scars  Lymphatic: no submandibular or cervical LAD  Neurologic: AAOx3; CNII-XII grossly intact; no focal deficits, 5/5 strength in all extremities, sensation in tact, negative finger to nose and heel to shin test. Upon assessing gait, pt was able to position to sit up at the edge of the best but unable to put weight on his legs. Reflex symmetric and present in all levels   Psychiatric: affect and characteristics of appearance, verbalizations, behaviors are appropriate

## 2019-09-15 NOTE — H&P ADULT - PROBLEM SELECTOR PLAN 1
Pt is a poor historian and states he cannot remember when the last time he was able to walk. Possibly months ago. He is unable to give details regarding his symptoms but states his L proximal thigh is hurting and sometimes his L ankle. Upon physical examination  CT thoacic/lumbar demonstrating disc bulge with facet and ligamentum flavum hypertrophy resulting in moderate spinal canal stenosis and moderate bilateral neural foraminal narrowing in L1-S1. Unclear etiology of weakness at this time possibly malingering.   - Neurology consult in AM  - Neurochecks Q6H  - consider MRI thoacic/lumbar inpatient   - PT/OT consult  - Fall precautions

## 2019-09-15 NOTE — H&P ADULT - PROBLEM SELECTOR PLAN 2
Pt with folate of 3.2 on admisssion in the setting of previous alcohol abuse. Although pt does not have macrocytic anemia on CBC.   - c/w Folate supplementation

## 2019-09-15 NOTE — ED PROVIDER NOTE - MUSCULOSKELETAL, MLM
Spine appears normal, range of motion is not limited, no muscle or joint tenderness Spine appears normal, range of motion is not limited, no muscle or joint tenderness + muscle atrophy apparent. Spine appears normal, range of motion is not limited, no muscle or joint tenderness + muscle atrophy apparent., no C ,T or L spine tenderness,

## 2019-09-15 NOTE — H&P ADULT - HISTORY OF PRESENT ILLNESS
69 yo with ho of alcohol withdrawal seizures, metabolic encephalopathy, hyponatremia and UTI w urinary retention sent to [facility] one month ago after significant deconditioning and inability to walk.    Of note pt was admitted 08/2019 admitted for metabolic encephalopathy and acute urinary retention. He was treated with Flomax and discharged with a clement with improvement in mental status.     In the ED VS T: 98.3 HR: 84 BP: 142/65 RR: 16 Saturating 96% on RA . Labs remarkable for Folate 3.2. CT head demonstrating No acute intracranial hemorrhage, mass effect or demarcated territorial infarct. CT thoracic/ lumbar disc bulge with facet and ligamentum flavum hypertrophy resulting in moderate spinal canal stenosis and moderate   bilateral neural foraminal narrowing in L1-S1. Pt admitted to [facility] for weakness and rehab placement. 69 yo with ho of alcohol withdrawal seizures, metabolic encephalopathy, hyponatremia and UTI w urinary retention sent to [facility] one month ago after significant deconditioning and inability to walk. Pt is a poor historian and states he cannot remember when the last time he was able to walk. Possibly months ago. He is unable to give details regarding his symptoms but states his L proximal thigh is hurting and sometimes his L ankle. His friend/girlfriend brought him into the ED to get evaluated by a neurologist because they could not lift him and take him anywhere. He denies any vision changes, urinary or fecal incontinence  N/T, focal weakness in any of the extremities, or syncope. He denies any recent travel.     Of note pt was admitted 08/2019 admitted for metabolic encephalopathy and acute urinary retention. He was treated with Flomax and discharged with a clement with improvement in mental status. He was sent to [facility] for 1 month and 7 days and his family/friends believed he was not ready to go home.     In the ED VS T: 98.3 HR: 84 BP: 142/65 RR: 16 Saturating 96% on RA . Labs remarkable for Folate 3.2. CT head demonstrating No acute intracranial hemorrhage, mass effect or demarcated territorial infarct. CT thoracic/ lumbar disc bulge with facet and ligamentum flavum hypertrophy resulting in moderate spinal canal stenosis and moderate   bilateral neural foraminal narrowing in L1-S1. Pt admitted to [facility] for further evaluation and management of weakness and rehab placement.

## 2019-09-15 NOTE — H&P ADULT - ATTENDING COMMENTS
Pt presenting shortly after discharge from [facility] for bilateral LE weakness since recent admission for UTI. Denies back pain, LE pain, sensory loss or parasthesias, bowel or bladder incontinence, fevers, chills, night sweats, arthralgias, rashes.   VSS.   CN3-12 intact, B/l UEs with 5/5 strength, B/l hip flexion 5/5, B/l knee extension and flexion 4/5, B/l dorsi/plantar flexion 5/5, Sensation grossly intact, b/l patellar reflexes symmetric and 2+. Unable to stand up from sitting position. Finger to nose intact.   Labs without leukocytosis, anemia, renal dysfunction. liver dysfunction. Mildly elevated ESR to 40, normal CRP and CK. Recent B12 and TSH wnl.   CT T/L spine with mild to moderate neural foraminal narrowing at multiple levels otherwise ok.  CTH with chronic microvascular disease, nothing acute.     Etiology of weakness unclear at this time. Can consider neurology consult, EMG testing and/or MRI. Will have physical therapy work with him as deconditioning likely playing a role.     Rest of plan per resident note.

## 2019-09-15 NOTE — ED ADULT TRIAGE NOTE - CHIEF COMPLAINT QUOTE
called in by [facility], pt was cleared for dispo however pt family requests pt to be evaluated.

## 2019-09-15 NOTE — ED PROVIDER NOTE - NEUROLOGICAL, MLM
Alert and oriented, no focal deficits, + prox muscle weakness lower legs. no numbness, leg raise intact 5/5 , difficulty standing on own and bearing body weight. nl upper extremity strength.

## 2019-09-15 NOTE — H&P ADULT - PROBLEM SELECTOR PLAN 4
Pt with hx of EtOH abuse Pt with hx of EtOH abuse, has not drank in 1 month. 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  - blood alcohol level negative on admission  - no Pt has fungal rash on L groin area which is itchy but not painful  - topical nystatin cream

## 2019-09-15 NOTE — H&P ADULT - PROBLEM SELECTOR PLAN 3
Pt with CT thoacic/lumbar demonstrating disc bulge with facet and ligamentum flavum hypertrophy resulting in moderate spinal canal stenosis and moderate   bilateral neural foraminal narrowing in L1-S1

## 2019-09-15 NOTE — H&P ADULT - PROBLEM SELECTOR PROBLEM 6
Transition of care performed with sharing of clinical summary Prophylactic measure History of urinary retention

## 2019-09-16 ENCOUNTER — TRANSCRIPTION ENCOUNTER (OUTPATIENT)
Age: 69
End: 2019-09-16

## 2019-09-16 DIAGNOSIS — R70.0 ELEVATED ERYTHROCYTE SEDIMENTATION RATE: ICD-10-CM

## 2019-09-16 DIAGNOSIS — B35.6 TINEA CRURIS: ICD-10-CM

## 2019-09-16 DIAGNOSIS — E55.9 VITAMIN D DEFICIENCY, UNSPECIFIED: ICD-10-CM

## 2019-09-16 LAB
ALBUMIN SERPL ELPH-MCNC: 3.1 G/DL — LOW (ref 3.3–5)
ALP SERPL-CCNC: 45 U/L — SIGNIFICANT CHANGE UP (ref 40–120)
ALT FLD-CCNC: 10 U/L — SIGNIFICANT CHANGE UP (ref 10–45)
ANION GAP SERPL CALC-SCNC: 9 MMOL/L — SIGNIFICANT CHANGE UP (ref 5–17)
AST SERPL-CCNC: 13 U/L — SIGNIFICANT CHANGE UP (ref 10–40)
BILIRUB SERPL-MCNC: 0.2 MG/DL — SIGNIFICANT CHANGE UP (ref 0.2–1.2)
BUN SERPL-MCNC: 11 MG/DL — SIGNIFICANT CHANGE UP (ref 7–23)
CALCIUM SERPL-MCNC: 8.7 MG/DL — SIGNIFICANT CHANGE UP (ref 8.4–10.5)
CHLORIDE SERPL-SCNC: 100 MMOL/L — SIGNIFICANT CHANGE UP (ref 96–108)
CK SERPL-CCNC: 48 U/L — SIGNIFICANT CHANGE UP (ref 30–200)
CO2 SERPL-SCNC: 27 MMOL/L — SIGNIFICANT CHANGE UP (ref 22–31)
CREAT SERPL-MCNC: 0.77 MG/DL — SIGNIFICANT CHANGE UP (ref 0.5–1.3)
CRP SERPL-MCNC: 0.13 MG/DL — SIGNIFICANT CHANGE UP (ref 0–0.4)
ERYTHROCYTE [SEDIMENTATION RATE] IN BLOOD: 40 MM/HR — HIGH
FOLATE SERPL-MCNC: 15.2 NG/ML — SIGNIFICANT CHANGE UP
GLUCOSE SERPL-MCNC: 88 MG/DL — SIGNIFICANT CHANGE UP (ref 70–99)
HCT VFR BLD CALC: 36.9 % — LOW (ref 39–50)
HGB BLD-MCNC: 12.3 G/DL — LOW (ref 13–17)
MAGNESIUM SERPL-MCNC: 2 MG/DL — SIGNIFICANT CHANGE UP (ref 1.6–2.6)
MCHC RBC-ENTMCNC: 32.3 PG — SIGNIFICANT CHANGE UP (ref 27–34)
MCHC RBC-ENTMCNC: 33.3 GM/DL — SIGNIFICANT CHANGE UP (ref 32–36)
MCV RBC AUTO: 96.9 FL — SIGNIFICANT CHANGE UP (ref 80–100)
NRBC # BLD: 0 /100 WBCS — SIGNIFICANT CHANGE UP (ref 0–0)
PHOSPHATE SERPL-MCNC: 4.3 MG/DL — SIGNIFICANT CHANGE UP (ref 2.5–4.5)
PLATELET # BLD AUTO: 247 K/UL — SIGNIFICANT CHANGE UP (ref 150–400)
POTASSIUM SERPL-MCNC: 3.8 MMOL/L — SIGNIFICANT CHANGE UP (ref 3.5–5.3)
POTASSIUM SERPL-SCNC: 3.8 MMOL/L — SIGNIFICANT CHANGE UP (ref 3.5–5.3)
PROT SERPL-MCNC: 6 G/DL — SIGNIFICANT CHANGE UP (ref 6–8.3)
RBC # BLD: 3.81 M/UL — LOW (ref 4.2–5.8)
RBC # FLD: 13.8 % — SIGNIFICANT CHANGE UP (ref 10.3–14.5)
SODIUM SERPL-SCNC: 136 MMOL/L — SIGNIFICANT CHANGE UP (ref 135–145)
TSH SERPL-MCNC: 3.24 UIU/ML — SIGNIFICANT CHANGE UP (ref 0.35–4.94)
WBC # BLD: 7.14 K/UL — SIGNIFICANT CHANGE UP (ref 3.8–10.5)
WBC # FLD AUTO: 7.14 K/UL — SIGNIFICANT CHANGE UP (ref 3.8–10.5)

## 2019-09-16 PROCEDURE — 99233 SBSQ HOSP IP/OBS HIGH 50: CPT | Mod: GC

## 2019-09-16 RX ORDER — CHOLECALCIFEROL (VITAMIN D3) 125 MCG
1000 CAPSULE ORAL
Qty: 0 | Refills: 0 | DISCHARGE
Start: 2019-09-16

## 2019-09-16 RX ORDER — GABAPENTIN 400 MG/1
100 CAPSULE ORAL THREE TIMES A DAY
Refills: 0 | Status: DISCONTINUED | OUTPATIENT
Start: 2019-09-16 | End: 2019-09-16

## 2019-09-16 RX ORDER — NYSTATIN CREAM 100000 [USP'U]/G
1 CREAM TOPICAL
Refills: 0 | Status: DISCONTINUED | OUTPATIENT
Start: 2019-09-16 | End: 2019-09-17

## 2019-09-16 RX ORDER — CHOLECALCIFEROL (VITAMIN D3) 125 MCG
1000 CAPSULE ORAL DAILY
Refills: 0 | Status: DISCONTINUED | OUTPATIENT
Start: 2019-09-16 | End: 2019-09-17

## 2019-09-16 RX ORDER — NYSTATIN CREAM 100000 [USP'U]/G
1 CREAM TOPICAL
Qty: 0 | Refills: 0 | DISCHARGE
Start: 2019-09-16

## 2019-09-16 RX ORDER — POTASSIUM CHLORIDE 20 MEQ
20 PACKET (EA) ORAL ONCE
Refills: 0 | Status: COMPLETED | OUTPATIENT
Start: 2019-09-16 | End: 2019-09-16

## 2019-09-16 RX ADMIN — GABAPENTIN 100 MILLIGRAM(S): 400 CAPSULE ORAL at 07:16

## 2019-09-16 RX ADMIN — Medication 1 MILLIGRAM(S): at 11:17

## 2019-09-16 RX ADMIN — Medication 1 TABLET(S): at 11:16

## 2019-09-16 RX ADMIN — NYSTATIN CREAM 1 APPLICATION(S): 100000 CREAM TOPICAL at 19:10

## 2019-09-16 RX ADMIN — NYSTATIN CREAM 1 APPLICATION(S): 100000 CREAM TOPICAL at 02:26

## 2019-09-16 RX ADMIN — TAMSULOSIN HYDROCHLORIDE 0.4 MILLIGRAM(S): 0.4 CAPSULE ORAL at 21:33

## 2019-09-16 RX ADMIN — ENOXAPARIN SODIUM 40 MILLIGRAM(S): 100 INJECTION SUBCUTANEOUS at 06:08

## 2019-09-16 RX ADMIN — Medication 100 MILLIGRAM(S): at 11:16

## 2019-09-16 RX ADMIN — Medication 1000 UNIT(S): at 11:16

## 2019-09-16 RX ADMIN — Medication 20 MILLIEQUIVALENT(S): at 11:16

## 2019-09-16 RX ADMIN — PREGABALIN 1000 MICROGRAM(S): 225 CAPSULE ORAL at 11:17

## 2019-09-16 NOTE — PROGRESS NOTE ADULT - PROBLEM SELECTOR PLAN 3
Patient w/ CT T/L Spine showing disc bulge resulting in moderate spinal canal stenosis and moderate bilateral neural foraminal narrowing in L1-S1 Patient w/ CT T/L Spine showing disc bulge resulting in moderate spinal canal stenosis and moderate bilateral neural foraminal narrowing in L1-S1  -Outpatient f/u Patient w/ CT T/L Spine showing disc bulge resulting in moderate spinal canal stenosis and moderate bilateral neural foraminal narrowing in L1-S1. 5/5 strength in BLE, intact sensation, no incontinence or saddle anesthesia   -Outpatient f/u

## 2019-09-16 NOTE — DISCHARGE NOTE PROVIDER - PROVIDER TOKENS
FREE:[LAST:[[facility]],PHONE:[phone]],FAX:[(   [phone]    -],ADDRESS:[[address] [place] [address]]]

## 2019-09-16 NOTE — OCCUPATIONAL THERAPY INITIAL EVALUATION ADULT - MD ORDER
69 yo with ho of alcohol withdrawal seizures, metabolic encephalopathy, hyponatremia and UTI w urinary retention sent to [facility] one month ago after significant deconditioning and inability to walk. Pt is a poor historian and states he cannot remember when the last time he was able to walk. Possibly months ago. He is unable to give details regarding his symptoms but states his L proximal thigh is hurting and sometimes his L ankle.

## 2019-09-16 NOTE — OCCUPATIONAL THERAPY INITIAL EVALUATION ADULT - ADDITIONAL COMMENTS
Pt lives with his girlfriend in apt complex equipped with elevator. Pt reports independence in BADLs and mobility prior to initial hospitalization and d/c to [facility] (~ 1 month ago) Pt also states that he ambulated with RW/cane support.

## 2019-09-16 NOTE — DISCHARGE NOTE PROVIDER - NSDCFUADDAPPT_GEN_ALL_CORE_FT
Please follow up with your new Primary Care Provider at [facility] on ____ @ ____. Visit the [address] office. If you need to make any changes to your appointment please call. Please bring a copy of this paper work with you. Please follow up with your new Primary Care Provider at [facility] on 10/3 @ 10am. Visit the [address] office. If you need to make any changes to your appointment please call. Please bring a copy of this paper work with you.

## 2019-09-16 NOTE — CONSULT NOTE ADULT - ASSESSMENT
per Internal Medicine team    67 yo gentleman w/ MHx of alcohol withdrawal seizures, metabolic encephalopathy, hyponatremia, UTI w/ urinary retention and recent hospital admission (8/2019) for urinary retention s/p [facility] placement for ~1 month 2/2 to deconditioning/inability to walk. Admitted to [facility] for weakness.    Problem/Plan - 1:  ·  Problem: Weakness.  Plan: Pt is a poor historian, cannot remember last time able to walk. Possibly months ago. PE benign with benign neuro exam. CT T/L Spine shows disc bulge with facet and ligamentum flavum hypertrophy resulting in moderate spinal canal stenosis, moderate bilateral neural foraminal narrowing in L1-S1. Differential includes worsening dementia (get up and go test), Syphilis (RPR neg), PMR (elevated ESR and difficulty getting up, but not UE weakness), poor diet/weakness.  -PT/OT consult  -Mental Status eval  -ESR 40, CK 48, TSH 3.244, RPR neg, UA neg  -nutrition consult  -Collateral from [facility]  -Fall precautions.     Problem/Plan - 2:  ·  Problem: Folate deficiency.  Plan: Patient w/ folate of 3.2 on admission, possibly 2/2 to EtOH use  -C/w folate supplementation.     Problem/Plan - 3:  ·  Problem: Spinal stenosis.  Plan: Patient w/ CT T/L Spine showing disc bulge resulting in moderate spinal canal stenosis and moderate bilateral neural foraminal narrowing in L1-S1  -Outpatient f/u.     Problem/Plan - 4:  ·  Problem: Tinea cruris.  Plan: Patient w/ fungal rash on L groin area   -Topical nystatin cream.     Problem/Plan - 5:  ·  Problem: ESR raised.  Plan: Isolated ESR elevated to 40. Isolated is less likely inflammation with CRP 0.13. Isolated can be monoclonal issue (no protein gap), SLE (no other symptoms)  -Continue to monitor.     Problem/Plan - 6:  Problem: ETOH abuse. Plan: Patient w/ hx of EtOH abuse, has not drank in 1 month; hx of seizures 2/2 to EtOH, previously on dilantin but d/c years ago  -GISELLA negative on admission.    Problem/Plan - 7:  ·  Problem: History of urinary retention.  Plan: Patient w/ hx of urinary retention on previous admission and d/c w/ Olmos which was removed at rehab; patient urinating on his own  -C/w Flomax 0.4mg.     Problem/Plan - 8:  ·  Problem: Vitamin D deficiency.  Plan: Vit D 25 13.4 on previous admission  -add Vit D 1000U qd, continue.     Problem/Plan - 9:  ·  Problem: Prophylactic measure.  Plan: F: No IVF  E: Replete K>4, Mg>2  N: Regular diet  DVT: Lovenox 40mg q24h  GI: Not needed  Dispo: [facility].

## 2019-09-16 NOTE — PROGRESS NOTE ADULT - PROBLEM SELECTOR PLAN 1
Pt is a poor historian, cannot remember last time able to walk. Possibly months ago. PE benign with benign neuro exam. CT T/L Spine shows disc bulge with facet and ligamentum flavum hypertrophy resulting in moderate spinal canal stenosis, moderate bilateral neural foraminal narrowing in L1-S1.  -Neurology consult pending  -Neurochecks q6h  -PT/OT consult  -Fall preacutions Pt is a poor historian, cannot remember last time able to walk. Possibly months ago. PE benign with benign neuro exam. CT T/L Spine shows disc bulge with facet and ligamentum flavum hypertrophy resulting in moderate spinal canal stenosis, moderate bilateral neural foraminal narrowing in L1-S1. Differential includes worsening dementia (get up and go test), Syphilis (RPR neg), PMR (elevated ESR and difficulty getting up, but not UE weakness), poor diet/weakness.  -PT/OT consult  -Mental Status eval  -ESR 40, CK 48, TSH 3.244, RPR neg, UA neg  -Fall precautions Pt is a poor historian, cannot remember last time able to walk. Possibly months ago. PE benign with benign neuro exam. CT T/L Spine shows disc bulge with facet and ligamentum flavum hypertrophy resulting in moderate spinal canal stenosis, moderate bilateral neural foraminal narrowing in L1-S1. Differential includes worsening dementia (get up and go test), Syphilis (RPR neg), PMR (elevated ESR and difficulty getting up, but not UE weakness), poor diet/weakness.  -PT/OT consult  -Mental Status eval  -ESR 40, CK 48, TSH 3.244, RPR neg, UA neg  -nutrition consult  -Collateral from [facility]  -Fall precautions Differential includes worsening dementia, poor diet/weakness. CT with stenosis but exam with UMN signs  -PT/OT consult-[facility]  -Fall precautions

## 2019-09-16 NOTE — OCCUPATIONAL THERAPY INITIAL EVALUATION ADULT - PLANNED THERAPY INTERVENTIONS, OT EVAL
motor coordination training/transfer training/neuromuscular re-education/balance training/ADL retraining/bed mobility training/strengthening

## 2019-09-16 NOTE — DISCHARGE NOTE PROVIDER - NSDCCPCAREPLAN_GEN_ALL_CORE_FT
PRINCIPAL DISCHARGE DIAGNOSIS  Diagnosis: Inability to walk  Assessment and Plan of Treatment: You came straight from rehab with pain and inability to walk. Imaging did not show any acute changes that would cause this, but there was outlet compression of nerves from L3-S1. The inability to walk was likely  from deconditioning and poor diet. You were started on folate 1mg and VitD 1000U. Nutrition recommended ____.  Please follow up with your Primary Care Provider after rehab.        SECONDARY DISCHARGE DIAGNOSES  Diagnosis: Vitamin D deficiency  Assessment and Plan of Treatment: On your last admission, you were found to have low VitD. Be sure to take VitD supplements as decsribed above. PRINCIPAL DISCHARGE DIAGNOSIS  Diagnosis: Inability to walk  Assessment and Plan of Treatment: You came straight from rehab with pain and inability to walk. Imaging did not show any acute changes that would cause this, but there was outlet compression of nerves from L3-S1. The inability to walk was likely  from deconditioning and poor diet. You were started on folate 1mg and VitD 1000U. Nutrition recommended Ensure Enlive BID. Please follow up with your Primary Care Provider after rehab.      SECONDARY DISCHARGE DIAGNOSES  Diagnosis: Vitamin D deficiency  Assessment and Plan of Treatment: On your last admission, you were found to have low VitD. Be sure to take VitD supplements as decsribed above.

## 2019-09-16 NOTE — PROGRESS NOTE ADULT - ATTENDING COMMENTS
Patient was seen and examined with the resident team today.  I agree with the above assessment and plan with the following exceptions/additions:     Briefly, this is a 67yo gentleman w/a PMH of EtOH dependence c/b withdrawals, UTIs c/b admissions for metabolic encephalopathy (8/2019) and well documented deconditioning i/s/o severe protein calorie malnutrition who p/w chronic weakness, mostly with ambulation, following a recent discharge from [facility], where he refused 24hour care.     -- PT and OT eval  -- pending eval and pt's preference, [facility] v home w/additional services   -- DVT PPx - Lovenox  -- Dispo - TBD    Holly Lemus  [phone]

## 2019-09-16 NOTE — PROGRESS NOTE ADULT - SUBJECTIVE AND OBJECTIVE BOX
69 yo man w/ PMHx of alcohol withdrawal seizures, metabolic encephalopathy, hyponatremia, URI with urinary retention and recent CHITO placement (s/p admission 8/2019 for metabolic encephalopathy/acute urinary retention) presents w/ inability to walk 2/2 to weakness in lower extremities.     OVERNIGHT EVENTS: None    SUBJECTIVE/INTERVAL HPI: Patient seen and examined at bedside. Patient states that he is sleeping well and would like to eat.     REVIEW OF SYSTEMS:  CONSTITUTIONAL: No weakness, fevers or chills.   NECK: No pain or stiffness  RESPIRATORY: No cough, wheezing, hemoptysis; No shortness of breath  CARDIOVASCULAR: No chest pain or palpitations  GASTROINTESTINAL: No abdominal or epigastric pain. No nausea, vomiting, or hematemesis; No diarrhea or constipation. No melena or hematochezia.  GENITOURINARY: No dysuria, frequency or hematuria  NEUROLOGICAL: No numbness or weakness  SKIN: No itching, burning, rashes, or lesions   All other review of systems is negative unless indicated above. 67 yo man w/ PMHx of alcohol withdrawal seizures, metabolic encephalopathy, hyponatremia, URI with urinary retention and recent CHITO placement (s/p admission 2019 for metabolic encephalopathy/acute urinary retention) presents w/ inability to walk 2/2 to weakness in lower extremities.     OVERNIGHT EVENTS: None    SUBJECTIVE/INTERVAL HPI: Patient seen and examined at bedside. Patient states that he is sleeping well and would like to eat.     REVIEW OF SYSTEMS:  CONSTITUTIONAL: No weakness, fevers or chills.   NECK: No pain or stiffness  RESPIRATORY: No cough, wheezing, hemoptysis; No shortness of breath  CARDIOVASCULAR: No chest pain or palpitations  GASTROINTESTINAL: No abdominal or epigastric pain. No nausea, vomiting, or hematemesis; No diarrhea or constipation. No melena or hematochezia.  GENITOURINARY: No dysuria, frequency or hematuria  NEUROLOGICAL: No numbness or weakness  SKIN: No itching, burning, rashes, or lesions   All other review of systems is negative unless indicated above.    VITAL SIGNS:  T(C): 36.7 (16 Sep 2019 05:00), Max: 36.9 (15 Sep 2019 20:33)  T(F): 98 (16 Sep 2019 05:00), Max: 98.4 (15 Sep 2019 20:33)  HR: 87 (16 Sep 2019 05:00) (74 - 94)  BP: 120/78 (16 Sep 2019 05:00) (97/62 - 142/65)  RR: 17 (16 Sep 2019 05:00) (16 - 18)  SpO2: 98% (16 Sep 2019 05:00) (96% - 100%)    VITAL SIGNS:  T(C): 36.7 (19 @ 05:00), Max: 36.9 (09-15-19 @ 20:33)  T(F): 98 (19 @ 05:00), Max: 98.4 (09-15-19 @ 20:33)  HR: 87 (19 @ 05:00) (74 - 94)  BP: 120/78 (19 @ 05:00) (97/62 - 142/65)  BP(mean): --  RR: 17 (19 @ 05:00) (16 - 18)  SpO2: 98% (19 @ 05:00) (96% - 100%)  Wt(kg): --    PHYSICAL EXAM:  Constitutional: WDWN resting comfortably in bed; NAD  HEENT: NC/AT, EOMI, anicteric sclera, MMM  Neck: supple; no lymphadenopathy  Respiratory: CTA B/L; no W/R/R, no retractions  Cardiac: +S1/S2; RRR; no M/R/G;  Gastrointestinal: soft, NT/ND; no rebound or guarding; +BSx4  Extremities: WWP, no clubbing or cyanosis; no peripheral edema  Musculoskeletal: Unable to assess ROM 2/2 to patient weakness in lower extremities; no joint swelling, tenderness or erythema  Vascular: 2+ radial, DP/PT pulses B/L  Dermatologic: skin warm, dry and intact; no rashes, wounds, or scars  Neurologic: AAOx3; CNII-XII grossly intact; motor strength 5/5 throughout, sensation to light touch intact throughout  Psychiatric: affect and characteristics of appearance, verbalizations, behaviors are appropriate    ALLERGIES:  No Known Allergies    MEDICATIONS  (STANDING):  Cholecalciferol 1000 Unit(s) Oral daily  Cyanocobalamin 1000 MICROGram(s) Oral daily  Enoxaparin Injectable 40 milliGRAM(s) SubCutaneous every 24 hours  Folic acid 1 milliGRAM(s) Oral daily  Gabapentin 100 milliGRAM(s) Oral three times a day  Multivitamin 1 Tablet(s) Oral daily  Nystatin Cream 1 Application(s) Topical two times a day  Potassium chloride   Powder 20 milliEquivalent(s) Oral once  Tamsulosin 0.4 milliGRAM(s) Oral at bedtime  Thiamine 100 milliGRAM(s) Oral daily    MEDICATIONS  (PRN):  None    LABS:                        12.3   7.14  )-----------( 247      ( 16 Sep 2019 06:28 )             36.9     -    136  |  100  |  11  ----------------------------<  88  3.8   |  27  |  0.77    Ca    8.7      16 Sep 2019 06:28  Phos  4.3     -  Mg     2.0     -    TPro  6.0  /  Alb  3.1<L>  /  TBili  0.2  /  DBili  x   /  AST  13  /  ALT  10  /  AlkPhos  45      PT/INR - ( 15 Sep 2019 17:05 )   PT: 11.9 sec;   INR: 1.05     PTT - ( 15 Sep 2019 17:05 )  PTT:30.9 sec    Urinalysis Basic - ( 15 Sep 2019 18:33 )  Color: Yellow / Appearance: Clear / S.010 / pH: x  Gluc: x / Ketone: NEGATIVE  / Bili: Negative / Urobili: 0.2 E.U./dL   Blood: x / Protein: NEGATIVE mg/dL / Nitrite: NEGATIVE   Leuk Esterase: NEGATIVE / RBC: < 5 /HPF / WBC < 5 /HPF   Sq Epi: x / Non Sq Epi: 0-5 /HPF / Bacteria: Present /HPF    RADIOLOGY & ADDITIONAL TESTS:  CT T-Spine/L-Spine:   No acute fracture or malalignment.  Multilevel degenerative changes as above.    CT Head No Contrast:   Since prior CT head 2019 there has been no interval change.  No acute intracranial hemorrhage, mass effect or demarcated territorial   infarct. 69 yo man w/ PMHx of alcohol withdrawal seizures, metabolic encephalopathy, hyponatremia, URI with urinary retention and recent CHITO placement (s/p admission 2019 for metabolic encephalopathy/acute urinary retention) presents w/ inability to walk 2/2 to weakness in lower extremities.     OVERNIGHT EVENTS: None    SUBJECTIVE/INTERVAL HPI: Patient seen and examined at bedside. Patient states that he is sleeping well and would like to eat.     REVIEW OF SYSTEMS:  CONSTITUTIONAL: No weakness, fevers or chills.   NECK: No pain or stiffness  RESPIRATORY: No cough, wheezing, hemoptysis; No shortness of breath  CARDIOVASCULAR: No chest pain or palpitations  GASTROINTESTINAL: No abdominal or epigastric pain. No nausea, vomiting, or hematemesis; No diarrhea or constipation. No melena or hematochezia.  GENITOURINARY: No dysuria, frequency or hematuria  NEUROLOGICAL: No numbness or weakness  SKIN: No itching, burning, rashes, or lesions   All other review of systems is negative unless indicated above.    VITAL SIGNS:  T(C): 36.7 (19 @ 05:00), Max: 36.9 (09-15-19 @ 20:33)  T(F): 98 (19 @ 05:00), Max: 98.4 (09-15-19 @ 20:33)  HR: 87 (19 @ 05:00) (74 - 94)  BP: 120/78 (19 @ 05:00) (97/62 - 142/65)  BP(mean): --  RR: 17 (19 @ 05:00) (16 - 18)  SpO2: 98% (19 @ 05:00) (96% - 100%)  Wt(kg): --    PHYSICAL EXAM:  Constitutional: WDWN resting comfortably in bed; NAD  HEENT: NC/AT, EOMI, anicteric sclera, MMM  Neck: supple; no lymphadenopathy  Respiratory: CTA B/L; no W/R/R, no retractions  Cardiac: +S1/S2; RRR; no M/R/G;  Gastrointestinal: soft, NT/ND; no rebound or guarding; +BSx4  Extremities: WWP, no clubbing or cyanosis; no peripheral edema  Musculoskeletal: Unable to assess ROM 2/2 to patient weakness in lower extremities; no joint swelling, tenderness or erythema  Vascular: 2+ radial, DP/PT pulses B/L  Dermatologic: skin warm, dry and intact; no rashes, wounds, or scars  Neurologic: AAOx3; CNII-XII grossly intact; motor strength 5/5 throughout, sensation to light touch intact throughout  Psychiatric: affect and characteristics of appearance, verbalizations, behaviors are appropriate    ALLERGIES:  No Known Allergies    MEDICATIONS  (STANDING):  Cholecalciferol 1000 Unit(s) Oral daily  Cyanocobalamin 1000 MICROGram(s) Oral daily  Enoxaparin Injectable 40 milliGRAM(s) SubCutaneous every 24 hours  Folic acid 1 milliGRAM(s) Oral daily  Gabapentin 100 milliGRAM(s) Oral three times a day  Multivitamin 1 Tablet(s) Oral daily  Nystatin Cream 1 Application(s) Topical two times a day  Potassium chloride   Powder 20 milliEquivalent(s) Oral once  Tamsulosin 0.4 milliGRAM(s) Oral at bedtime  Thiamine 100 milliGRAM(s) Oral daily    MEDICATIONS  (PRN):  None    LABS:                        12.3   7.14  )-----------( 247      ( 16 Sep 2019 06:28 )             36.9     09-16    136  |  100  |  11  ----------------------------<  88  3.8   |  27  |  0.77    Ca    8.7      16 Sep 2019 06:28  Phos  4.3     09-16  Mg     2.0     -16    TPro  6.0  /  Alb  3.1<L>  /  TBili  0.2  /  DBili  x   /  AST  13  /  ALT  10  /  AlkPhos  45  09-16    PT/INR - ( 15 Sep 2019 17:05 )   PT: 11.9 sec;   INR: 1.05     PTT - ( 15 Sep 2019 17:05 )  PTT:30.9 sec    Urinalysis Basic - ( 15 Sep 2019 18:33 )  Color: Yellow / Appearance: Clear / S.010 / pH: x  Gluc: x / Ketone: NEGATIVE  / Bili: Negative / Urobili: 0.2 E.U./dL   Blood: x / Protein: NEGATIVE mg/dL / Nitrite: NEGATIVE   Leuk Esterase: NEGATIVE / RBC: < 5 /HPF / WBC < 5 /HPF   Sq Epi: x / Non Sq Epi: 0-5 /HPF / Bacteria: Present /HPF    RADIOLOGY & ADDITIONAL TESTS:  CT T-Spine/L-Spine:   No acute fracture or malalignment.  Multilevel degenerative changes as above.    CT Head No Contrast:   Since prior CT head 2019 there has been no interval change.  No acute intracranial hemorrhage, mass effect or demarcated territorial   infarct. 67 yo man w/ PMHx of alcohol withdrawal seizures, metabolic encephalopathy, hyponatremia, URI with urinary retention and recent CHITO placement (s/p admission 2019 for metabolic encephalopathy/acute urinary retention) presents w/ inability to walk 2/2 to weakness in lower extremities.     OVERNIGHT EVENTS: None    SUBJECTIVE/INTERVAL HPI: Patient seen and examined at bedside. Patient states that he is sleeping well and would like to eat. Saws at home he has poor diet. In the past expereince LLE heaviness, but no longer experiences that. He denies feeling weak, or any pain. Denies incontinence.     REVIEW OF SYSTEMS:  CONSTITUTIONAL: No weakness, fevers or chills.   NECK: No pain or stiffness  RESPIRATORY: No cough, wheezing, hemoptysis; No shortness of breath  CARDIOVASCULAR: No chest pain or palpitations  GASTROINTESTINAL: No abdominal or epigastric pain. No nausea, vomiting, or hematemesis; No diarrhea or constipation. No melena or hematochezia.  GENITOURINARY: No dysuria, frequency or hematuria  NEUROLOGICAL: No numbness or weakness  SKIN: No itching, burning, rashes, or lesions   All other review of systems is negative unless indicated above.    VITAL SIGNS:  T(C): 36.7 (19 @ 05:00), Max: 36.9 (09-15-19 @ 20:33)  T(F): 98 (19 @ 05:00), Max: 98.4 (09-15-19 @ 20:33)  HR: 87 (19 @ 05:00) (74 - 94)  BP: 120/78 (19 @ 05:00) (97/62 - 142/65)  BP(mean): --  RR: 17 (19 @ 05:00) (16 - 18)  SpO2: 98% (19 @ 05:00) (96% - 100%)  Wt(kg): --    PHYSICAL EXAM:  Constitutional: WDWN resting comfortably in bed; NAD  HEENT: NC/AT, EOMI, anicteric sclera, MMM  Neck: supple; no lymphadenopathy  Respiratory: CTA B/L; no W/R/R, no retractions  Cardiac: +S1/S2; RRR; no M/R/G;  Gastrointestinal: soft, NT/ND; no rebound or guarding; +BSx4  Extremities: WWP, no clubbing or cyanosis; no peripheral edema  Musculoskeletal: Unable to assess ROM 2/2 to patient weakness in lower extremities; no joint swelling, tenderness or erythema  Vascular: 2+ radial, DP/PT pulses B/L  Dermatologic: skin warm, dry and intact; no rashes, wounds, or scars  Neurologic: AAOx3; CNII-XII grossly intact; motor strength 5/5 throughout, sensation to light touch intact throughout  Psychiatric: affect and characteristics of appearance, verbalizations, behaviors are appropriate    ALLERGIES:  No Known Allergies    MEDICATIONS  (STANDING):  Cholecalciferol 1000 Unit(s) Oral daily  Cyanocobalamin 1000 MICROGram(s) Oral daily  Enoxaparin Injectable 40 milliGRAM(s) SubCutaneous every 24 hours  Folic acid 1 milliGRAM(s) Oral daily  Gabapentin 100 milliGRAM(s) Oral three times a day  Multivitamin 1 Tablet(s) Oral daily  Nystatin Cream 1 Application(s) Topical two times a day  Potassium chloride   Powder 20 milliEquivalent(s) Oral once  Tamsulosin 0.4 milliGRAM(s) Oral at bedtime  Thiamine 100 milliGRAM(s) Oral daily    MEDICATIONS  (PRN):  None    LABS:                        12.3   7.14  )-----------( 247      ( 16 Sep 2019 06:28 )             36.9     -    136  |  100  |  11  ----------------------------<  88  3.8   |  27  |  0.77    Ca    8.7      16 Sep 2019 06:28  Phos  4.3     -  Mg     2.0     -    TPro  6.0  /  Alb  3.1<L>  /  TBili  0.2  /  DBili  x   /  AST  13  /  ALT  10  /  AlkPhos  45  -16    PT/INR - ( 15 Sep 2019 17:05 )   PT: 11.9 sec;   INR: 1.05     PTT - ( 15 Sep 2019 17:05 )  PTT:30.9 sec    Urinalysis Basic - ( 15 Sep 2019 18:33 )  Color: Yellow / Appearance: Clear / S.010 / pH: x  Gluc: x / Ketone: NEGATIVE  / Bili: Negative / Urobili: 0.2 E.U./dL   Blood: x / Protein: NEGATIVE mg/dL / Nitrite: NEGATIVE   Leuk Esterase: NEGATIVE / RBC: < 5 /HPF / WBC < 5 /HPF   Sq Epi: x / Non Sq Epi: 0-5 /HPF / Bacteria: Present /HPF    RADIOLOGY & ADDITIONAL TESTS:  CT T-Spine/L-Spine:   No acute fracture or malalignment.  Multilevel degenerative changes as above.    CT Head No Contrast:   Since prior CT head 2019 there has been no interval change.  No acute intracranial hemorrhage, mass effect or demarcated territorial   infarct. 67 yo man w/ PMHx of alcohol withdrawal seizures, metabolic encephalopathy, hyponatremia, URI with urinary retention and recent CHITO placement (s/p admission 2019 for metabolic encephalopathy/acute urinary retention) presents w/ inability to walk 2/2 to weakness in lower extremities.     OVERNIGHT EVENTS: None    SUBJECTIVE/INTERVAL HPI: Patient seen and examined at bedside. Patient states that he is sleeping well and would like to eat. Saws at home he has poor diet. In the past expereince LLE heaviness, but no longer experiences that. He denies feeling weak, or any pain. Denies incontinence.     REVIEW OF SYSTEMS:  CONSTITUTIONAL: No weakness, fevers or chills.   NECK: No pain or stiffness  RESPIRATORY: No cough, wheezing, hemoptysis; No shortness of breath  CARDIOVASCULAR: No chest pain or palpitations  GASTROINTESTINAL: No abdominal or epigastric pain. No nausea, vomiting, or hematemesis; No diarrhea or constipation. No melena or hematochezia.  GENITOURINARY: No dysuria, frequency or hematuria  NEUROLOGICAL: No numbness or weakness  SKIN: No itching, burning, rashes, or lesions   All other review of systems is negative unless indicated above.    VITAL SIGNS:  T(C): 36.7 (19 @ 05:00), Max: 36.9 (09-15-19 @ 20:33)  T(F): 98 (19 @ 05:00), Max: 98.4 (09-15-19 @ 20:33)  HR: 87 (19 @ 05:00) (74 - 94)  BP: 120/78 (19 @ 05:00) (97/62 - 142/65)  BP(mean): --  RR: 17 (19 @ 05:00) (16 - 18)  SpO2: 98% (19 @ 05:00) (96% - 100%)  Wt(kg): --    PHYSICAL EXAM:  Constitutional: WDWN resting comfortably in bed; NAD  HEENT: NC/AT, EOMI, anicteric sclera, MMM  Neck: supple; no lymphadenopathy  Respiratory: CTA B/L; no W/R/R, no retractions  Cardiac: +S1/S2; RRR; no M/R/G;  Gastrointestinal: soft, NT/ND; no rebound or guarding; +BSx4  Extremities: WWP, no clubbing or cyanosis; no peripheral edema  Musculoskeletal:  no joint swelling, tenderness or erythema  Vascular: 2+ radial, DP/PT pulses B/L  Dermatologic: skin warm, dry and intact; no rashes, wounds, or scars  Neurologic: AAOx2 (person, year, medical setting); CNII-XII grossly intact- irregular R. pupil (baseline 2/2 trauma); motor strength 5/5 throughout, sensation to light touch intact throughout. Asymmetric Patellar reflexes (R>L), downgoing plantar, intact proprioception bilaterally  Psychiatric: affect and characteristics of appearance, verbalizations, behaviors are appropriate    ALLERGIES:  No Known Allergies    MEDICATIONS  (STANDING):  Cholecalciferol 1000 Unit(s) Oral daily  Cyanocobalamin 1000 MICROGram(s) Oral daily  Enoxaparin Injectable 40 milliGRAM(s) SubCutaneous every 24 hours  Folic acid 1 milliGRAM(s) Oral daily  Gabapentin 100 milliGRAM(s) Oral three times a day  Multivitamin 1 Tablet(s) Oral daily  Nystatin Cream 1 Application(s) Topical two times a day  Potassium chloride   Powder 20 milliEquivalent(s) Oral once  Tamsulosin 0.4 milliGRAM(s) Oral at bedtime  Thiamine 100 milliGRAM(s) Oral daily    MEDICATIONS  (PRN):  None    LABS:                        12.3   7.14  )-----------( 247      ( 16 Sep 2019 06:28 )             36.9     -16    136  |  100  |  11  ----------------------------<  88  3.8   |  27  |  0.77    Ca    8.7      16 Sep 2019 06:28  Phos  4.3     -  Mg     2.0     -16    TPro  6.0  /  Alb  3.1<L>  /  TBili  0.2  /  DBili  x   /  AST  13  /  ALT  10  /  AlkPhos  45  -16    PT/INR - ( 15 Sep 2019 17:05 )   PT: 11.9 sec;   INR: 1.05     PTT - ( 15 Sep 2019 17:05 )  PTT:30.9 sec    Urinalysis Basic - ( 15 Sep 2019 18:33 )  Color: Yellow / Appearance: Clear / S.010 / pH: x  Gluc: x / Ketone: NEGATIVE  / Bili: Negative / Urobili: 0.2 E.U./dL   Blood: x / Protein: NEGATIVE mg/dL / Nitrite: NEGATIVE   Leuk Esterase: NEGATIVE / RBC: < 5 /HPF / WBC < 5 /HPF   Sq Epi: x / Non Sq Epi: 0-5 /HPF / Bacteria: Present /HPF    RADIOLOGY & ADDITIONAL TESTS:  CT T-Spine/L-Spine:   No acute fracture or malalignment.  Multilevel degenerative changes as above.    CT Head No Contrast:   Since prior CT head 2019 there has been no interval change.  No acute intracranial hemorrhage, mass effect or demarcated territorial   infarct.

## 2019-09-16 NOTE — OCCUPATIONAL THERAPY INITIAL EVALUATION ADULT - GENERAL OBSERVATIONS, REHAB EVAL
Pt is right hand dominant. Pt's chart reviewed. Pt's RN Orin aware of intent to evaluate/tx: cleared Pt. Pt received semi-supine - breakfast tray set up. +heplock. Agreeable to OT jeanine.

## 2019-09-16 NOTE — PHYSICAL THERAPY INITIAL EVALUATION ADULT - ADDITIONAL COMMENTS
Pt. has been ambulating with RW untill recent fall with subsequent admission to [facility]. Pt. was still unable to ambulate post d/c from [facility] and had difficulty standing up without assistance. Pt. lives in an elevator building.

## 2019-09-16 NOTE — PROGRESS NOTE ADULT - PROBLEM SELECTOR PLAN 8
1) PCP Contacted on Admission: (Y/N) --> Name & Phone #:  2) Date of Contact with PCP:  3) PCP Contacted at Discharge: (Y/N, N/A)  4) Summary of Handoff Given to PCP  5) Post-Discharge Appointment Date and Location: Vit D 25 13.4 on previous admission  -add Vit D 1000U qd, continue

## 2019-09-16 NOTE — PROGRESS NOTE ADULT - PROBLEM SELECTOR PLAN 9
F: No IVF  E: Replete K>4, Mg>2  N: Regular diet  DVT: Lovenox 40mg q24h  GI: Not needed  Dispo: [facility] F: No IVF  E: Replete K>4, Mg>2  N: Regular diet  DVT: Lovenox 40mg q24h  Last BM: 9/14  GI: Not needed  Dispo: [facility]

## 2019-09-16 NOTE — PROGRESS NOTE ADULT - PROBLEM SELECTOR PLAN 6
Patient w/ hx of urinary retention on previous admission and d/c w/ Olmos which was removed at rehab; patient urinating on his own  -C/w Flomax Patient w/ hx of EtOH abuse, has not drank in 1 month; hx of seizures 2/2 to EtOH, previously on dilantin but d/c years ago  -GISELLA negative on admission

## 2019-09-16 NOTE — CONSULT NOTE ADULT - SUBJECTIVE AND OBJECTIVE BOX
Patient is a 68y old  Male who presents with a chief complaint of Weakness/inability to walk (16 Sep 2019 07:34)       HPI:  67 yo with ho of alcohol withdrawal seizures, metabolic encephalopathy, hyponatremia and UTI w urinary retention sent to [place] one month ago after significant deconditioning and inability to walk. Pt is a poor historian and states he cannot remember when the last time he was able to walk. Possibly months ago. He is unable to give details regarding his symptoms but states his L proximal thigh is hurting and sometimes his L ankle. His friend/girlfriend brought him into the ED to get evaluated by a neurologist because they could not lift him and take him anywhere. He denies any vision changes, urinary or fecal incontinence  N/T, focal weakness in any of the extremities, or syncope. He denies any recent travel.     Of note pt was admitted 2019 admitted for metabolic encephalopathy and acute urinary retention. He was treated with Flomax and discharged with a clement with improvement in mental status. He was sent to [facility] [place] for 1 month and 7 days and his family/friends believed he was not ready to go home.     In the ED VS T: 98.3 HR: 84 BP: 142/65 RR: 16 Saturating 96% on RA . Labs remarkable for Folate 3.2. CT head demonstrating No acute intracranial hemorrhage, mass effect or demarcated territorial infarct. CT thoracic/ lumbar disc bulge with facet and ligamentum flavum hypertrophy resulting in moderate spinal canal stenosis and moderate   bilateral neural foraminal narrowing in L1-S1. Pt admitted to [facility] for further evaluation and management of weakness and rehab placement. (15 Sep 2019 20:18)      PAST MEDICAL & SURGICAL HISTORY:  UTI (urinary tract infection)  Alcohol abuse  Seizure  History of hip replacement      MEDICATIONS  (STANDING):  cholecalciferol 1000 Unit(s) Oral daily  cyanocobalamin 1000 MICROGram(s) Oral daily  enoxaparin Injectable 40 milliGRAM(s) SubCutaneous every 24 hours  folic acid 1 milliGRAM(s) Oral daily  multivitamin 1 Tablet(s) Oral daily  nystatin Cream 1 Application(s) Topical two times a day  tamsulosin 0.4 milliGRAM(s) Oral at bedtime  thiamine 100 milliGRAM(s) Oral daily    MEDICATIONS  (PRN):      Social History: retired doorman           - Home Living Status: lives with his girlfriend in an elevator accessible apartment building           - Home Care Services: denied    Functional Level Prior to Admission:           - ADL's:  girlfriend assists           - ambulates with a cane/walker    FAMILY HISTORY:      CBC Full  -  ( 16 Sep 2019 06:28 )  WBC Count : 7.14 K/uL  RBC Count : 3.81 M/uL  Hemoglobin : 12.3 g/dL  Hematocrit : 36.9 %  Platelet Count - Automated : 247 K/uL  Mean Cell Volume : 96.9 fl  Mean Cell Hemoglobin : 32.3 pg  Mean Cell Hemoglobin Concentration : 33.3 gm/dL  Auto Neutrophil # : x  Auto Lymphocyte # : x  Auto Monocyte # : x  Auto Eosinophil # : x  Auto Basophil # : x  Auto Neutrophil % : x  Auto Lymphocyte % : x  Auto Monocyte % : x  Auto Eosinophil % : x  Auto Basophil % : x          136  |  100  |  11  ----------------------------<  88  3.8   |  27  |  0.77    Ca    8.7      16 Sep 2019 06:28  Phos  4.3       Mg     2.0         TPro  6.0  /  Alb  3.1<L>  /  TBili  0.2  /  DBili  x   /  AST  13  /  ALT  10  /  AlkPhos  45        Urinalysis Basic - ( 15 Sep 2019 18:33 )    Color: Yellow / Appearance: Clear / S.010 / pH: x  Gluc: x / Ketone: NEGATIVE  / Bili: Negative / Urobili: 0.2 E.U./dL   Blood: x / Protein: NEGATIVE mg/dL / Nitrite: NEGATIVE   Leuk Esterase: NEGATIVE / RBC: < 5 /HPF / WBC < 5 /HPF   Sq Epi: x / Non Sq Epi: 0-5 /HPF / Bacteria: Present /HPF          Radiology:    < from: CT Head No Cont (09.15.19 @ 17:39) >  EXAM:  CT BRAIN                          PROCEDURE DATE:  09/15/2019          INTERPRETATION:  Clinical history/reason for exam: Altered mental status    Technique: Multiple contiguous axial CT images of the head were obtained   from the base of the skull to the vertex without the administration of   intravenous contrast. Coronal and sagittal reformatted images were   obtained.    Comparison:CT head 2019    Findings:     The ventricles and sulci are prominent consistent with parenchymal volume   loss.    No evidence for intracranial hemorrhage, significant space occupying   process or recent territorial infarction.  No acute extra-axial fluid   collections are identified.    Gray-white matter differentiation is preserved. There are scattered foci   of periventricular and subcortical hypodensities consistent with mild   chronic microvascular ischemic disease. .     The bones of the calvarium are intact.      The paranasal sinuses and bilateral mastoid complexes are well aerated.   Status post right ocular lens replacement.    Impression:    Since prior CT head 2019 there has been no interval change.    No acute intracranial hemorrhage, mass effect or demarcated territorial   infarct.        < from: CT Thoracic Spine No Cont (09.15.19 @ 17:45) >    EXAM:  CT THORACIC SPINE                          EXAM:  CT LUMBAR SPINE                          PROCEDURE DATE:  09/15/2019          INTERPRETATION:  PROCEDURE: CT thoracic spine without contrast    INDICATION: Chronic leg weakness, and ability to bear weight.    TECHNIQUE:  Multiple axial sections were obtained of the thoracic spine   without IV contrast. Sagittal and coronal reformats were obtained from   the axial data set. The images were reviewed in soft tissue and bone   windows.    COMPARISON: None    FINDINGS:     There is mild dextrocurvature of the thoracic spine. There is normal   kyphosis of the thoracic spine. The vertebral body heights are   maintained. There is no spondylolisthesis. Disc heights are maintained.   The prevertebral soft tissues are unremarkable.    There is facet and ligamentum flavum hypertrophy resulting in bilateral   neural foraminal narrowing in the lower thoracic spine.    IMPRESSION:    No acute fracture or malalignment.      PROCEDURE: CT lumbar spine without contrast    INDICATION: As above    TECHNIQUE:  Multiple axial sections were obtained from the thoracolumbar   junction through the sacrum. Sagittal and coronal reformats were obtained   from the axial data set. The images were reviewed in soft tissue and bone   windows.    COMPARISON: None    FINDINGS: There is mild levocurvature of the lumbar spine. There is trace   anterolisthesis L4 on L5. There is preservation of the normal lumbar   lordosis. The vertebral body heights are maintained. There is mild disc   space narrowing throughout the lumbar spine.    The prevertebral soft tissues demonstrate vascular calcifications and a   distended urinary bladder.    At L1-L2, there is a disc bulge with facet hypertrophy resulting in mild  bilateral neural foraminal narrowing.    At L2-L3, there is a disc bulge with facet and ligamentum flavum   hypertrophy resulting in mild spinal canal stenosis and bilateral neural   foraminal narrowing.    At L3-L4, there is a disc bulge with facet and ligamentum flavum   hypertrophy resulting in moderate spinal canal stenosis and moderate   bilateral neural foraminal narrowing    At L4-L5 there is a disc bulge with facet and ligamentum flavum   hypertrophy resulting in mild spinal canal stenosis and moderate   bilateral neural foraminal narrowing.    L5-S1 there is a disc bulge with facet and ligamentum flavum hypertrophy   resulting in mild to moderate bilateral neuroforaminal narrowing.    IMPRESSION:    No acute fracture or malalignment.    Multilevel degenerative changes as above.                    Vital Signs Last 24 Hrs  T(C): 36.4 (16 Sep 2019 08:30), Max: 36.9 (15 Sep 2019 20:33)  T(F): 97.6 (16 Sep 2019 08:30), Max: 98.4 (15 Sep 2019 20:33)  HR: 84 (16 Sep 2019 08:30) (74 - 94)  BP: 95/65 (16 Sep 2019 08:30) (95/65 - 142/65)  BP(mean): --  RR: 18 (16 Sep 2019 08:30) (16 - 18)  SpO2: 96% (16 Sep 2019 08:30) (96% - 100%)    REVIEW OF SYSTEMS:    CONSTITUTIONAL: fatigue  EYES: No eye pain, visual disturbances, or discharge  ENMT:  No difficulty hearing, tinnitus, vertigo; No sinus or throat pain  NECK: No pain or stiffness  BREASTS: No pain, masses, or nipple discharge  RESPIRATORY: No cough, wheezing, chills or hemoptysis; No shortness of breath  CARDIOVASCULAR: No chest pain, palpitations, dizziness, or leg swelling  GASTROINTESTINAL: No abdominal or epigastric pain. No nausea, vomiting, or hematemesis; No diarrhea or constipation. No melena or hematochezia.  GENITOURINARY: No dysuria, frequency, hematuria, or incontinence  NEUROLOGICAL: No headaches, memory loss, loss of strength, numbness, or tremors  SKIN: No itching, burning, rashes, or lesions   LYMPH NODES: No enlarged glands  ENDOCRINE: No heat or cold intolerance; No hair loss  MUSCULOSKELETAL: No joint pain or swelling; No muscle, back, or extremity pain  PSYCHIATRIC: No depression, anxiety, mood swings, or difficulty sleeping  HEME/LYMPH: No easy bruising, or bleeding gums  ALLERGY AND IMMUNOLOGIC: No hives or eczema  VASCULAR: no swelling, erythema      Physical Exam: 67 yo  gentleman lying in semi Chaparro's position, c/o leg weakness    Head: normocephalic, atraumatic    Eyes: PERRLA, EOMI, no nystagmus, sclera anicteric    ENT: nasal discharge, uvula midline, no oropharyngeal erythema/exudate    Neck: supple, negative JVD, negative carotid bruits, no thyromegaly    Chest: CTA bilaterally, neg wheeze/ rhonchi/ rales/ crackles/ egophany    Cardiovascular: regular rate and rhythm, neg murmurs/rubs/gallops    Abdomen: soft, non distended, non tender, negative rebound/guarding, normal bowel sounds, neg hepatosplenomegaly    Extremities: WWP, neg cyanosis/clubbing/edema, negative calf tenderness to palpation, negative Luis Alfredo's sign    :     Neurologic Exam:    Motor Exam:    Upper Extremities:     RIght:    4/5               negative drift    Left :      4/5               negative drift    Lower Extremities:                 Right:      4/5  DF/PF/ evertors/ invertors                4/5  Quad/ hamstrings                3/5  hip flexors/adductors/abductors                 Left:        4/5  DF/PF/ evertors/ invertors                4/5  Quad/ hamstrings                3/5  hip flexors/adductors/abductors                   Sensory:    intact to LT/PP in all UE/LE dermatomes    DTR:            = biceps/     triceps/     brachioradialis                      = patella/   medial hamstring/ankle                      neg clonus                      neg Babinski                      Gait:  not tested        PM&R Impression:    1) deconditioned  2) no focal weakness        Recommendations:    1) Physical therapy focusing on therapeutic exercises, bed mobility/transfer out of bed evaluation, progressive ambulation with assistive devices prn.    2) Anticipated Disposition Plan/Recs: subacute rehab placement

## 2019-09-16 NOTE — PHYSICAL THERAPY INITIAL EVALUATION ADULT - PREDICTED DURATION OF THERAPY (DAYS/WKS), PT EVAL
Pt. would benefit from cont. PT follow up to improve functional mobility, decrease  burden of care; prevent further deconditioning.

## 2019-09-16 NOTE — PROGRESS NOTE ADULT - PROBLEM SELECTOR PLAN 10
1) PCP Contacted on Admission: (Y/N) --> Name & Phone #:  2) Date of Contact with PCP:  3) PCP Contacted at Discharge: (Y/N, N/A)  4) Summary of Handoff Given to PCP  5) Post-Discharge Appointment Date and Location: 1) PCP Contacted on Admission: (Y/N) --> Name & Phone #: [facility]  2) Date of Contact with PCP:  3) PCP Contacted at Discharge: (Y/N, N/A)  4) Summary of Handoff Given to PCP  5) Post-Discharge Appointment Date and Location: [facility] Appt

## 2019-09-16 NOTE — DISCHARGE NOTE PROVIDER - HOSPITAL COURSE
JEANNINE QUIROZ is 68y gentleman with medical history of alcohol withdrawal seizures, metabolic encephalopathy, hyponatremia who presents w/ inability to walk 2/2 to weakness in lower extremities.         Problem List/Main Diagnoses (system-based):         NEURO:     #Weakness: Patient presented from [facility] with weakness and LLE pain. CTH with no interval change since 8/7. CT Spine demonstrating disc bulge with facet and ligamentum flavum     hypertrophy resulting in moderate spinal canal stenosis and moderate bilateral neural foraminal narrowing from L3-S1. RPR negative, ESR 40, CRP 0.13, TSH 3.244, folate 3.2. In setting of poor diet (per patient), it was likely he was weak from deconditioning and poor PO intake. PT/OT recommended [facility]. Nutrition was consulted and they recommended_____.         ENDOCRINE:    #VitD Deficiency: Vit D 25 13.4 on previous admission. Started on Vit D 1000U qd.            New medications: VitD, Folate    Labs to be followed outpatient: None    Exam to be followed outpatient: Neuro JEANNINE QUIROZ is 68y gentleman with medical history of alcohol withdrawal seizures, metabolic encephalopathy, hyponatremia who presents w/ inability to walk 2/2 to weakness in lower extremities.         Problem List/Main Diagnoses (system-based):         NEURO:     #Weakness: Patient presented from [facility] with weakness and LLE pain. CTH with no interval change since 8/7. CT Spine demonstrating disc bulge with facet and ligamentum flavum     hypertrophy resulting in moderate spinal canal stenosis and moderate bilateral neural foraminal narrowing from L3-S1. RPR negative, ESR 40, CRP 0.13, TSH 3.244, folate 3.2. In setting of poor diet (per patient), it was likely he was weak from deconditioning and poor PO intake. PT/OT recommended [facility].          ENDOCRINE:    #VitD Deficiency: Vit D 25 13.4 on previous admission. Started on Vit D 1000U qd.        GENERAL:    #Nutrition: Nutrition recommended Ensure Enlive BID.            New medications: VitD, Folate, Ensure Enlive BID    Labs to be followed outpatient: None    Exam to be followed outpatient: Neuro

## 2019-09-16 NOTE — PROGRESS NOTE ADULT - ASSESSMENT
67 yo w/ PMHx of alcohol withdrawal seizures, metabolic encephalopathy, hyponatremia, UTI w/ urinary retention and recent hospital admission (8/2019) for urinary retention s/p [facility] placement for ~1 month 2/2 to deconditioning/inability to walk. Admitted to [facility] for weakness/rehab placement. 69 yo gentleman w/ MHx of alcohol withdrawal seizures, metabolic encephalopathy, hyponatremia, UTI w/ urinary retention and recent hospital admission (8/2019) for urinary retention s/p [facility] placement for ~1 month 2/2 to deconditioning/inability to walk. Admitted to [facility] for weakness.

## 2019-09-16 NOTE — PROGRESS NOTE ADULT - PROBLEM SELECTOR PLAN 7
F: No IVF  E: Replete K>4, Mg>2  N: Regular diet  DVT: Lovenox 40mg q24h  GI: Not needed  Dispo: [facility] Patient w/ hx of urinary retention on previous admission and d/c w/ [facility] which was removed at rehab; patient urinating on his own  -C/w Flomax 0.4mg

## 2019-09-16 NOTE — PROGRESS NOTE ADULT - PROBLEM SELECTOR PLAN 5
Patient w/ hx of EtOH abuse, has not drank in 1 month; hx of seizures 2/2 to EtOH, previously on dilantin but d/c years ago  -GISELLA negative on admission Isolated ESR elevated to 40. Isolated is less likely inflammation with CRP 0.13. Isolated can be monoclonal issue (no protein gap), SLE (no other symptoms)  -Continue to monitor

## 2019-09-16 NOTE — DISCHARGE NOTE PROVIDER - CARE PROVIDER_API CALL
[facility],   [address] [place] [address]  Phone: [phone]  Fax: (   )    -  Follow Up Time:

## 2019-09-17 ENCOUNTER — TRANSCRIPTION ENCOUNTER (OUTPATIENT)
Age: 69
End: 2019-09-17

## 2019-09-17 VITALS — WEIGHT: 152.12 LBS

## 2019-09-17 DIAGNOSIS — R26.2 DIFFICULTY IN WALKING, NOT ELSEWHERE CLASSIFIED: ICD-10-CM

## 2019-09-17 LAB
ANION GAP SERPL CALC-SCNC: 7 MMOL/L — SIGNIFICANT CHANGE UP (ref 5–17)
BUN SERPL-MCNC: 11 MG/DL — SIGNIFICANT CHANGE UP (ref 7–23)
CALCIUM SERPL-MCNC: 8.8 MG/DL — SIGNIFICANT CHANGE UP (ref 8.4–10.5)
CHLORIDE SERPL-SCNC: 99 MMOL/L — SIGNIFICANT CHANGE UP (ref 96–108)
CO2 SERPL-SCNC: 29 MMOL/L — SIGNIFICANT CHANGE UP (ref 22–31)
CREAT SERPL-MCNC: 0.84 MG/DL — SIGNIFICANT CHANGE UP (ref 0.5–1.3)
GLUCOSE SERPL-MCNC: 93 MG/DL — SIGNIFICANT CHANGE UP (ref 70–99)
HCT VFR BLD CALC: 37.7 % — LOW (ref 39–50)
HGB BLD-MCNC: 12.3 G/DL — LOW (ref 13–17)
MAGNESIUM SERPL-MCNC: 2 MG/DL — SIGNIFICANT CHANGE UP (ref 1.6–2.6)
MCHC RBC-ENTMCNC: 31.5 PG — SIGNIFICANT CHANGE UP (ref 27–34)
MCHC RBC-ENTMCNC: 32.6 GM/DL — SIGNIFICANT CHANGE UP (ref 32–36)
MCV RBC AUTO: 96.7 FL — SIGNIFICANT CHANGE UP (ref 80–100)
NRBC # BLD: 0 /100 WBCS — SIGNIFICANT CHANGE UP (ref 0–0)
PLATELET # BLD AUTO: 227 K/UL — SIGNIFICANT CHANGE UP (ref 150–400)
POTASSIUM SERPL-MCNC: 4.1 MMOL/L — SIGNIFICANT CHANGE UP (ref 3.5–5.3)
POTASSIUM SERPL-SCNC: 4.1 MMOL/L — SIGNIFICANT CHANGE UP (ref 3.5–5.3)
RBC # BLD: 3.9 M/UL — LOW (ref 4.2–5.8)
RBC # FLD: 13.6 % — SIGNIFICANT CHANGE UP (ref 10.3–14.5)
SODIUM SERPL-SCNC: 135 MMOL/L — SIGNIFICANT CHANGE UP (ref 135–145)
WBC # BLD: 7.21 K/UL — SIGNIFICANT CHANGE UP (ref 3.8–10.5)
WBC # FLD AUTO: 7.21 K/UL — SIGNIFICANT CHANGE UP (ref 3.8–10.5)

## 2019-09-17 PROCEDURE — 84443 ASSAY THYROID STIM HORMONE: CPT

## 2019-09-17 PROCEDURE — 85652 RBC SED RATE AUTOMATED: CPT

## 2019-09-17 PROCEDURE — 87186 SC STD MICRODIL/AGAR DIL: CPT

## 2019-09-17 PROCEDURE — 99239 HOSP IP/OBS DSCHRG MGMT >30: CPT | Mod: GC

## 2019-09-17 PROCEDURE — 83735 ASSAY OF MAGNESIUM: CPT

## 2019-09-17 PROCEDURE — 85610 PROTHROMBIN TIME: CPT

## 2019-09-17 PROCEDURE — 72131 CT LUMBAR SPINE W/O DYE: CPT

## 2019-09-17 PROCEDURE — 97161 PT EVAL LOW COMPLEX 20 MIN: CPT

## 2019-09-17 PROCEDURE — 72128 CT CHEST SPINE W/O DYE: CPT

## 2019-09-17 PROCEDURE — 71045 X-RAY EXAM CHEST 1 VIEW: CPT

## 2019-09-17 PROCEDURE — 86140 C-REACTIVE PROTEIN: CPT

## 2019-09-17 PROCEDURE — 85025 COMPLETE CBC W/AUTO DIFF WBC: CPT

## 2019-09-17 PROCEDURE — 80053 COMPREHEN METABOLIC PANEL: CPT

## 2019-09-17 PROCEDURE — 82550 ASSAY OF CK (CPK): CPT

## 2019-09-17 PROCEDURE — 81001 URINALYSIS AUTO W/SCOPE: CPT

## 2019-09-17 PROCEDURE — 82746 ASSAY OF FOLIC ACID SERUM: CPT

## 2019-09-17 PROCEDURE — 84100 ASSAY OF PHOSPHORUS: CPT

## 2019-09-17 PROCEDURE — 80048 BASIC METABOLIC PNL TOTAL CA: CPT

## 2019-09-17 PROCEDURE — 36415 COLL VENOUS BLD VENIPUNCTURE: CPT

## 2019-09-17 PROCEDURE — 80307 DRUG TEST PRSMV CHEM ANLYZR: CPT

## 2019-09-17 PROCEDURE — 70450 CT HEAD/BRAIN W/O DYE: CPT

## 2019-09-17 PROCEDURE — 99285 EMERGENCY DEPT VISIT HI MDM: CPT

## 2019-09-17 PROCEDURE — 85730 THROMBOPLASTIN TIME PARTIAL: CPT

## 2019-09-17 PROCEDURE — 87086 URINE CULTURE/COLONY COUNT: CPT

## 2019-09-17 PROCEDURE — 85027 COMPLETE CBC AUTOMATED: CPT

## 2019-09-17 RX ORDER — POLYETHYLENE GLYCOL 3350 17 G/17G
17 POWDER, FOR SOLUTION ORAL DAILY
Refills: 0 | Status: DISCONTINUED | OUTPATIENT
Start: 2019-09-17 | End: 2019-09-17

## 2019-09-17 RX ORDER — SENNA PLUS 8.6 MG/1
1 TABLET ORAL DAILY
Refills: 0 | Status: DISCONTINUED | OUTPATIENT
Start: 2019-09-17 | End: 2019-09-17

## 2019-09-17 RX ADMIN — ENOXAPARIN SODIUM 40 MILLIGRAM(S): 100 INJECTION SUBCUTANEOUS at 05:51

## 2019-09-17 RX ADMIN — Medication 1 TABLET(S): at 11:26

## 2019-09-17 RX ADMIN — Medication 100 MILLIGRAM(S): at 11:26

## 2019-09-17 RX ADMIN — Medication 1 MILLIGRAM(S): at 11:26

## 2019-09-17 RX ADMIN — SENNA PLUS 1 TABLET(S): 8.6 TABLET ORAL at 11:26

## 2019-09-17 RX ADMIN — Medication 1000 UNIT(S): at 11:26

## 2019-09-17 RX ADMIN — PREGABALIN 1000 MICROGRAM(S): 225 CAPSULE ORAL at 11:26

## 2019-09-17 RX ADMIN — POLYETHYLENE GLYCOL 3350 17 GRAM(S): 17 POWDER, FOR SOLUTION ORAL at 11:26

## 2019-09-17 RX ADMIN — NYSTATIN CREAM 1 APPLICATION(S): 100000 CREAM TOPICAL at 05:51

## 2019-09-17 NOTE — PROGRESS NOTE ADULT - PROBLEM SELECTOR PLAN 3
Patient w/ CT T/L Spine showing disc bulge resulting in moderate spinal canal stenosis and moderate bilateral neural foraminal narrowing in L1-S1  -Outpatient F/U

## 2019-09-17 NOTE — PROGRESS NOTE ADULT - PROBLEM SELECTOR PLAN 2
Patient w/ folate to 3.2 on admission, possibly 2/2 to EtOH abuse  -C/W folate supplementation
Patient w/ folate of 3.2 on admission, possibly 2/2 to EtOH use  -C/w folate supplementation

## 2019-09-17 NOTE — PROGRESS NOTE ADULT - ASSESSMENT
per Internal Medicine Team      67 yo man with PMHx of alcohol withdrawal seizures, metabolic encephalopathy, hyponatremia, UTI w/ urinary retention and recent hospital admission (8/2019) for urinary retention/metabolic encephalopathy s/p [facility] placement for 1 month with limited improvement in symptoms per [facility]. Patient was admitted for weakness/inability to walk and found to be deconditioned.    Problem/Plan - 1:  ·  Problem: Weakness.  Plan: Pt is a poor historian, cannot remember the last time he was able to walk. Possibly months ago. Benign neuro exam. CT T/L Spine shows disc bulge with face and ligamentum flavum hypertrophy resulting in moderate spinal canal stenosis, moderate bilateral neural foraminal narrowing in L1-S1. Differential includes worsening dementia (get up and go test), syphilis (RPR negative), PMR (elevated ESR and difficulty getting up, but no UE weakness), poor diet/weakness 2/2 to deconditioning  -PT/OT recommending d/c to [facility]  -Patient AOx2 at baseline  -ESR 40, CK 48, TSH 3.244, RPR negative, UA negative  -Fall precautions.     Problem/Plan - 2:  ·  Problem: Folate deficiency.  Plan: Patient w/ folate to 3.2 on admission, possibly 2/2 to EtOH abuse  -C/W folate supplementation.     Problem/Plan - 3:  ·  Problem: Spinal stenosis.  Plan: Patient w/ CT T/L Spine showing disc bulge resulting in moderate spinal canal stenosis and moderate bilateral neural foraminal narrowing in L1-S1  -Outpatient F/U.     Problem/Plan - 4:  ·  Problem: Tinea cruris.  Plan: Patient w/ fungal rash on L groin area  -C/w topical nystatin cream.     Problem/Plan - 5:  ·  Problem: ESR raised.  Plan: Isolated ESR to 40, CRP 0.13; could be monoclonal issue (no protein gap), SLE (no other symptoms)  -Continue to monitor.     Problem/Plan - 6:  Problem: ETOH abuse. Plan: Patient w/ hx of EtOH abuse without use in the last month; hx of seizures 2/2 to EtOH, previously on dilantin but d/c years ago  -GISELLA negative on admission  -Motivational interviewing.    Problem/Plan - 7:  ·  Problem: History of urinary retention.  Plan: Patient w/ hx of urinary retention on previous admission and d/c w/ [facility] 8/2019, subsequently removed at [facility], patient urinating on his own  -C/w Flomax 0.4mg.     Problem/Plan - 8:  ·  Problem: Vitamin D deficiency.  Plan: Vit D-25 13.4 on previous admission  -C/w Vit D 1000U qid.     Problem/Plan - 9:  ·  Problem: Prophylactic measure.  Plan: F: No IVF  E: Replete K>4, Mg>2  N: Regular diet  DVT: Lovenox 40 mg q24h  GI: Not needed  Dispo: [facility].

## 2019-09-17 NOTE — PROGRESS NOTE ADULT - PROBLEM SELECTOR PLAN 6
Patient w/ hx of EtOH abuse without use in the last month; hx of seizures 2/2 to EtOH, previously on dilantin but d/c years ago  -GISELLA negative on admission  -Motivational interviewing

## 2019-09-17 NOTE — PROGRESS NOTE ADULT - PROBLEM SELECTOR PLAN 5
Isolated ESR to 40, CRP 0.13; could be monoclonal issue (no protein gap), SLE (no other symptoms)  -Continue to monitor

## 2019-09-17 NOTE — PROGRESS NOTE ADULT - PROBLEM SELECTOR PLAN 7
Patient w/ hx of urinary retention on previous admission and d/c w/ [facility] 8/2019, subsequently removed at [facility], patient urinating on his own  -C/w Flomax 0.4mg

## 2019-09-17 NOTE — PROGRESS NOTE ADULT - ASSESSMENT
69 yo man with PMHx of alcohol withdrawal seizures, metabolic encephalopathy, hyponatremia, UTI w/ urinary retention and recent hospital admission (8/2019) for urinary retention/metabolic encephalopathy s/p [facility] placement for 1 month with limited improvement in symptoms per [facility]. Patient was admitted for weakness/inability to walk and found to be deconditioned.

## 2019-09-17 NOTE — PROGRESS NOTE ADULT - PROBLEM SELECTOR PLAN 10
1) PCP Contacted on Admission: (Y/N) --> Name & Phone #:  2) Date of Contact with PCP:  3) PCP Contacted at Discharge: (Y/N, N/A)  4) Summary of Handoff Given to PCP  5) Post-Discharge Appointment Date and Location

## 2019-09-17 NOTE — DIETITIAN INITIAL EVALUATION ADULT. - PROBLEM SELECTOR PLAN 7
F: No IVF  E: Replete K>4 Mag>2  N: Regular Diet  DVT: Lovenox 40mg Q24H  GI: not needed  Dispo: [facility]

## 2019-09-17 NOTE — DIETITIAN INITIAL EVALUATION ADULT. - OTHER INFO
68M with hx of alcohol withdrawal seizures, metabolic encephalopathy, hyponatremia and UTI w urinary retention. Admitted with significant deconditioning and inability to walk. Pt is a poor historian and states he cannot remember when the last time he was able to walk. Possibly months ago. Pt admitted to [facility] for weakness and rehab placement. Pt recently admitted in August, known to nutrition services from prior admission, wt noted at 50kg, indicating +9kg gain x1 month however per prior admission and per pt report notes UBW 76kg, unclear on duration however last admit pt noted UBW 69kg and notes loss over 1.5 years. Pt noted to be poor historian, notes appetite was poor but now with improvement, consuming ~75% of meals, unclear as to duration with poor intake. Denies n/v/d/c, chewing/ swallowing issues or pain impacting intake, skin with stage 1 pressure ulcer per report. Encouraged intake through day to best meet pt needs, pt reports he will be DC back to rehab soon, will follow per protocol.

## 2019-09-17 NOTE — DIETITIAN INITIAL EVALUATION ADULT. - MALNUTRITION
Per physical assessment: Muscle Wasting- Temporal [ x  ]  Clavicle/Pectoral [   ]  Shoulder/Deltoid [   ]  Scapula [   ]  Interosseous [   ]  Quadriceps [   ]  Gastrocnemius [   ]; Fat Wasting- Orbital [   ]  Buccal [   ]  Triceps [   ]  Rib [   ]--> Suspect moderate PCM 2/2 to physical assessment, and noted wt loss of 10kg, 14% x1.5 years (although pt poor historian unclear on exact wt change) ; please see malnutrition chart note.

## 2019-09-17 NOTE — PROGRESS NOTE ADULT - PROBLEM SELECTOR PLAN 4
Patient w/ fungal rash on L groin area  -C/w topical nystatin cream
Patient w/ fungal rash on L groin area   -Topical nystatin cream

## 2019-09-17 NOTE — PROGRESS NOTE ADULT - PROBLEM SELECTOR PLAN 1
Pt is a poor historian, cannot remember the last time he was able to walk. Possibly months ago. Benign neuro exam. CT T/L Spine shows disc bulge with face and ligamentum flavum hypertrophy resulting in moderate spinal canal stenosis, moderate bilateral neural foraminal narrowing in L1-S1. Differential includes worsening dementia (get up and go test), syphilis (RPR negative), PMR (elevated ESR and difficulty getting up, but no UE weakness), poor diet/weakness 2/2 to deconditioning  -PT/OT recommending d/c to [facility]  -Patient AOx2 at baseline  -ESR 40, CK 48, TSH 3.244, RPR negative, UA negative  -Fall precautions

## 2019-09-17 NOTE — CHART NOTE - NSCHARTNOTEFT_GEN_A_CORE
Upon Nutritional Assessment by the Registered Dietitian your patient was determined to meet criteria / has evidence of the following diagnosis/diagnoses:          [ ]  Mild Protein Calorie Malnutrition        [x ]  Moderate Protein Calorie Malnutrition        [ ] Severe Protein Calorie Malnutrition        [ ] Unspecified Protein Calorie Malnutrition        [ ] Underweight / BMI <19        [ ] Morbid Obesity / BMI > 40    Per physical assessment: Muscle Wasting- Temporal [ x  ]  Clavicle/Pectoral [   ]  Shoulder/Deltoid [   ]  Scapula [   ]  Interosseous [   ]  Quadriceps [   ]  Gastrocnemius [   ]; Fat Wasting- Orbital [   ]  Buccal [   ]  Triceps [   ]  Rib [   ]--> Suspect moderate PCM 2/2 to physical assessment, and noted wt loss of 10kg, 14% x1.5 years (although pt poor historian unclear on exact wt change) ; please see malnutrition chart note.    Findings as based on:  •  Comprehensive nutrition assessment and consultation      Treatment:    The following diet has been recommended:    1. Regular diet + Ensure Enlive BID (700 kcal, 40g protein, 360 mL free H2O)   2. C/w MVI, B12, Folic Acid     PROVIDER Section:     By signing this assessment you are acknowledging and agree with the diagnosis/diagnoses assigned by the Registered Dietitian    Comments:

## 2019-09-17 NOTE — DIETITIAN INITIAL EVALUATION ADULT. - PROBLEM SELECTOR PLAN 5
Pt with hx of EtOH abuse, has not drank in 1 month. 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  - blood alcohol level negative on admission  - no

## 2019-09-17 NOTE — DIETITIAN INITIAL EVALUATION ADULT. - ENERGY NEEDS
ABW used for calculations as pt between % of IBW.   ABW 59kg, IBW 72kg, 81% IBW, ht 69", BMI 19.2   Nutrient needs based on [facility] standards of care for repletion in adults, adjusted for stage 1 pressure ulcer

## 2019-09-17 NOTE — DIETITIAN INITIAL EVALUATION ADULT. - ADD RECOMMEND
1. Encourage intake through day 2. Manage pain prn 3. Monitor and replete lytes prn 4. C/w MVI, folic acid, B12 5. Add Ensure Enlive BID (700 kcal, 40g protein, 360 mL free H2O)

## 2019-09-17 NOTE — PROGRESS NOTE ADULT - PROBLEM SELECTOR PLAN 9
F: No IVF  E: Replete K>4, Mg>2  N: Regular diet  DVT: Lovenox 40 mg q24h  GI: Not needed  Dispo: [facility]

## 2019-09-17 NOTE — DISCHARGE NOTE NURSING/CASE MANAGEMENT/SOCIAL WORK - PATIENT PORTAL LINK FT
You can access the FollowMyHealth Patient Portal offered by [facility] by registering at the following website: [email or web address]. By joining [facility]’s FollowMyHealth portal, you will also be able to view your health information using other applications (apps) compatible with our system. Hide Include Location In Plan Question?: No Detail Level: Zone

## 2019-09-17 NOTE — DIETITIAN INITIAL EVALUATION ADULT. - PROBLEM SELECTOR PLAN 6
Pt had a urinary retention on previous admission and was discharged with Olmos which was removed at rehab. Pt is urinating on his own.  - c/w Flomax

## 2019-09-17 NOTE — PROGRESS NOTE ADULT - SUBJECTIVE AND OBJECTIVE BOX
Physical Medicine and Rehabilitation Progress Note:    Patient is a 68y old  Male who presents with a chief complaint of Weakness/inability to walk (17 Sep 2019 06:37)      HPI:  67 yo with ho of alcohol withdrawal seizures, metabolic encephalopathy, hyponatremia and UTI w urinary retention sent to [facility] one month ago after significant deconditioning and inability to walk. Pt is a poor historian and states he cannot remember when the last time he was able to walk. Possibly months ago. He is unable to give details regarding his symptoms but states his L proximal thigh is hurting and sometimes his L ankle. His friend/girlfriend brought him into the ED to get evaluated by a neurologist because they could not lift him and take him anywhere. He denies any vision changes, urinary or fecal incontinence  N/T, focal weakness in any of the extremities, or syncope. He denies any recent travel.     Of note pt was admitted 08/2019 admitted for metabolic encephalopathy and acute urinary retention. He was treated with Flomax and discharged with a clement with improvement in mental status. He was sent to [facility] for 1 month and 7 days and his family/friends believed he was not ready to go home.     In the ED VS T: 98.3 HR: 84 BP: 142/65 RR: 16 Saturating 96% on RA . Labs remarkable for Folate 3.2. CT head demonstrating No acute intracranial hemorrhage, mass effect or demarcated territorial infarct. CT thoracic/ lumbar disc bulge with facet and ligamentum flavum hypertrophy resulting in moderate spinal canal stenosis and moderate   bilateral neural foraminal narrowing in L1-S1. Pt admitted to [facility] for further evaluation and management of weakness and rehab placement. (15 Sep 2019 20:18)                            12.3   7.21  )-----------( 227      ( 17 Sep 2019 07:03 )             37.7       09-17    135  |  99  |  11  ----------------------------<  93  4.1   |  29  |  0.84    Ca    8.8      17 Sep 2019 07:03  Phos  4.3     09-16  Mg     2.0     09-17    TPro  6.0  /  Alb  3.1<L>  /  TBili  0.2  /  DBili  x   /  AST  13  /  ALT  10  /  AlkPhos  45  09-16    Vital Signs Last 24 Hrs  T(C): 36.3 (17 Sep 2019 08:22), Max: 36.9 (17 Sep 2019 05:36)  T(F): 97.3 (17 Sep 2019 08:22), Max: 98.4 (17 Sep 2019 05:36)  HR: 89 (17 Sep 2019 08:22) (71 - 89)  BP: 103/71 (17 Sep 2019 08:22) (103/71 - 121/76)  BP(mean): --  RR: 18 (17 Sep 2019 08:22) (18 - 18)  SpO2: 97% (17 Sep 2019 08:22) (97% - 99%)    MEDICATIONS  (STANDING):  cholecalciferol 1000 Unit(s) Oral daily  cyanocobalamin 1000 MICROGram(s) Oral daily  enoxaparin Injectable 40 milliGRAM(s) SubCutaneous every 24 hours  folic acid 1 milliGRAM(s) Oral daily  multivitamin 1 Tablet(s) Oral daily  nystatin Cream 1 Application(s) Topical two times a day  polyethylene glycol 3350 17 Gram(s) Oral daily  senna 1 Tablet(s) Oral daily  tamsulosin 0.4 milliGRAM(s) Oral at bedtime  thiamine 100 milliGRAM(s) Oral daily    MEDICATIONS  (PRN):    Currently Undergoing Physical Therapy at bedside.    Initial Functional Status Assessment:    Previous Level of Function:     · Ambulation Skills	needed assist; needs device	  · Transfer Skills	needed assist; needs device	  · ADL Skills	needed assist	  · Additional Comments	Pt. has been ambulating with RW untill recent fall with subsequent admission to [facility]. Pt. was still unable to ambulate post d/c from [facility] and had difficulty standing up without assistance. Pt. lives in an elevator building.	    Cognitive Status Examination:   · Orientation	oriented to person, place, time and situation	  · Level of Consciousness	alert	  · Personal Safety and Judgment	intact	    Range of Motion Exam:   · Range of Motion Examination	no ROM deficits were identified	    MMT Hip:     · Hip Flexion	(4) good, left  4+ = good plus	    MMT Knee:     · Knee Flexion	4+ = good plus  4+ = good plus	  · Knee Extension	(4) good, right  (4) good, left	    MMT Ankle:     · Ankle Dorsiflexion	4+ = good plus  4+ = good plus	  · Ankle Plantarflexion	4+ = good plus  4+ = good plus	    Bed Mobility: Rolling/Turning:     · Level of Hardy	independent	  · Assistive Device	bed rails	    Bed Mobility: Scooting/Bridging:     · Level of Hardy	moderate assist (50% patients effort)	  · Physical Assist/Nonphysical Assist	1 person assist	  · Assistive Device	bed rails	    Bed Mobility: Sit to Supine:     · Level of Hardy	minimum assist (75% patients effort)	  · Physical Assist/Nonphysical Assist	1 person assist	    Bed Mobility: Supine to Sit:     · Level of Hardy	minimum assist (75% patients effort)	  · Physical Assist/Nonphysical Assist	1 person assist	    Transfer: Sit to Stand:     · Level of Hardy	moderate assist (50% patients effort)	  · Physical Assist/Nonphysical Assist	1 person assist	  · Weight-Bearing Restrictions	weight-bearing as tolerated	  · Assistive Device	rolling walker	    Transfer: Stand to Sit:     · Level of Hardy	moderate assist (50% patients effort)	  · Physical Assist/Nonphysical Assist	1 person assist	  · Weight-Bearing Restrictions	weight-bearing as tolerated	  · Assistive Device	rolling walker	    Sit/Stand Transfer Safety Analysis:     · Transfer Safety Concerns Noted	decreased weight-shifting ability	  · Impairments Contributing to Impaired Transfers	impaired balance; decreased strength	    Gait Skills:     · Level of Hardy	moderate assist (50% patients effort)	  · Physical Assist/Nonphysical Assist	1 person assist	  · Weight-Bearing Restrictions	weight-bearing as tolerated	  · Assistive Device	rolling walker	  · Gait Distance	5 side steps x3	    Gait Analysis:     · Gait Deviations Noted	decreased clement; decreased weight-shifting ability; decreased step length	  · Impairments Contributing to Gait Deviations	impaired balance; decreased strength; buckling	    Stair Negotiation:     · Level of Hardy	TBA	    Balance Skills Assessment:     · Sitting Balance: Static	good balance	  · Sitting Balance: Dynamic	good balance	  · Sit-to-Stand Balance	poor balance	  · Standing Balance: Static	fair minus	  · Standing Balance: Dynamic	poor balance	  · Identified Impairments Contributing to Balance Disturbance	decreased strength; impaired coordination	    Sensory Examination:   Sensory Examination:    Grossly Intact:   · Gross Sensory Examination	Grossly Intact; Left LE; Right LE; to light touch	      Clinical Impressions:   · Predicted Duration of Therapy Intervention (days/wks)	Pt. would benefit from cont. PT follow up to improve functional mobility, decrease  burden of care; prevent further deconditioning.	        PM&R Impression: as above     Disposition Plan Recommendations: subacute rehab placement

## 2019-09-17 NOTE — PROGRESS NOTE ADULT - SUBJECTIVE AND OBJECTIVE BOX
67 yo man w/ PMHx of alcohol withdrawal seizures, metabolic encephalopathy, hyponatremia, UTI w/ urinary retention and recent [facility]O placement (s/p admission 8/2019 for metabolic encephalopathy/acute urinary retention) presents w/ inability to walk 2/2 to weakness in lower extremities.    OVERNIGHT EVENTS: None    SUBJECTIVE/INTERVAL HPI:     REVIEW OF SYSTEMS:  CONSTITUTIONAL: No weakness, fevers or chills.   EYES/ENT: No visual changes;  No vertigo or throat pain   NECK: No pain or stiffness  RESPIRATORY: No cough, wheezing, hemoptysis; No shortness of breath  CARDIOVASCULAR: No chest pain or palpitations  GASTROINTESTINAL: No abdominal or epigastric pain. No nausea, vomiting, or hematemesis; No diarrhea or constipation. No melena or hematochezia.  GENITOURINARY: No dysuria, frequency or hematuria  NEUROLOGICAL: No numbness or weakness  SKIN: No itching, burning, rashes, or lesions   All other review of systems is negative unless indicated above.    Vital Signs Last 24 Hrs  T(C): 36.9 (17 Sep 2019 05:36), Max: 36.9 (17 Sep 2019 05:36)  T(F): 98.4 (17 Sep 2019 05:36), Max: 98.4 (17 Sep 2019 05:36)  HR: 87 (17 Sep 2019 05:36) (71 - 87)  BP: 118/76 (17 Sep 2019 05:36) (95/65 - 121/76)  RR: 18 (17 Sep 2019 05:36) (18 - 18)  SpO2: 98% (17 Sep 2019 05:36) (96% - 99%)    PHYSICAL EXAM:  Constitutional: WDWN resting comfortably in bed; NAD  HEENT: NC/AT, EOMI, anicteric sclera, no oropharyngeal erythema or exudates; MMM  Neck: supple; no lymphadenopathy  Respiratory: CTA B/L; no W/R/R, no retractions  Cardiac: +S1/S2; RRR; no M/R/G; PMI non-displaced  Gastrointestinal: soft, NT/ND; no rebound or guarding; +BSx4  Extremities: WWP, no clubbing or cyanosis; no peripheral edema  Musculoskeletal: No joint swelling, tenderness or erythema  Vascular: 2+ radial, DP/PT pulses B/L  Neurologic: AAOx3; CNII-XII grossly intact; no focal deficits  Psychiatric: affect and characteristics of appearance, verbalizations, behaviors are appropriate    ALLERGIES:  No known allergies    MEDICATIONS  (STANDING):  cholecalciferol 1000 Unit(s) Oral daily  cyanocobalamin 1000 MICROGram(s) Oral daily  enoxaparin Injectable 40 milliGRAM(s) SubCutaneous every 24 hours  folic acid 1 milliGRAM(s) Oral daily  multivitamin 1 Tablet(s) Oral daily  nystatin Cream 1 Application(s) Topical two times a day  polyethylene glycol 3350 17 Gram(s) Oral daily  senna 1 Tablet(s) Oral daily  tamsulosin 0.4 milliGRAM(s) Oral at bedtime  thiamine 100 milliGRAM(s) Oral daily    MEDICATIONS  (PRN):  None    LABS: 69 yo man w/ PMHx of alcohol withdrawal seizures, metabolic encephalopathy, hyponatremia, UTI w/ urinary retention and recent CHITO placement (s/p admission 8/2019 for metabolic encephalopathy/acute urinary retention) presents w/ inability to walk 2/2 to weakness in lower extremities.    OVERNIGHT EVENTS: None    SUBJECTIVE/INTERVAL HPI:     REVIEW OF SYSTEMS:    VITAL SIGNS:  Vital Signs Last 24 Hrs  T(C): 36.9 (17 Sep 2019 05:36), Max: 36.9 (17 Sep 2019 05:36)  T(F): 98.4 (17 Sep 2019 05:36), Max: 98.4 (17 Sep 2019 05:36)  HR: 87 (17 Sep 2019 05:36) (71 - 87)  BP: 118/76 (17 Sep 2019 05:36) (95/65 - 121/76)  RR: 18 (17 Sep 2019 05:36) (18 - 18)  SpO2: 98% (17 Sep 2019 05:36) (96% - 99%)    PHYSICAL EXAM:    ALLERGIES:  No known allergies    MEDICATIONS  (STANDING):  cholecalciferol 1000 Unit(s) Oral daily  cyanocobalamin 1000 MICROGram(s) Oral daily  enoxaparin Injectable 40 milliGRAM(s) SubCutaneous every 24 hours  folic acid 1 milliGRAM(s) Oral daily  multivitamin 1 Tablet(s) Oral daily  nystatin Cream 1 Application(s) Topical two times a day  polyethylene glycol 3350 17 Gram(s) Oral daily  senna 1 Tablet(s) Oral daily  tamsulosin 0.4 milliGRAM(s) Oral at bedtime  thiamine 100 milliGRAM(s) Oral daily    MEDICATIONS  (PRN):  None    LABS: 67 yo man w/ PMHx of alcohol withdrawal seizures, metabolic encephalopathy, hyponatremia, UTI w/ urinary retention and recent CHITO placement (s/p admission 2019 for metabolic encephalopathy/acute urinary retention) presents w/ inability to walk 2/2 to weakness in lower extremities.    OVERNIGHT EVENTS: None    SUBJECTIVE/INTERVAL HPI: Patient seen and examined at bedside. Patient states that he is eating and sleeping well but endorses new, mild lower back pain associated with pain in his left lower extremity    REVIEW OF SYSTEMS:  CONSTITUTIONAL: No weakness, fevers or chills.   NECK: No pain or stiffness  RESPIRATORY: No cough, wheezing, hemoptysis; No shortness of breath  CARDIOVASCULAR: No chest pain or palpitations  GASTROINTESTINAL: No abdominal or epigastric pain. No nausea, vomiting, or hematemesis; No diarrhea or constipation. No melena or hematochezia.  GENITOURINARY: No dysuria, frequency or hematuria  EXTREMITIES: +Left lower extremity pain  NEUROLOGICAL: No numbness or weakness  SKIN: No itching, burning, rashes, or lesions   All other review of systems is negative unless indicated above.    VITAL SIGNS:  Vital Signs Last 24 Hrs  T(C): 36.9 (17 Sep 2019 05:36), Max: 36.9 (17 Sep 2019 05:36)  T(F): 98.4 (17 Sep 2019 05:36), Max: 98.4 (17 Sep 2019 05:36)  HR: 87 (17 Sep 2019 05:36) (71 - 87)  BP: 118/76 (17 Sep 2019 05:36) (95/65 - 121/76)  RR: 18 (17 Sep 2019 05:36) (18 - 18)  SpO2: 98% (17 Sep 2019 05:36) (96% - 99%)    PHYSICAL EXAM:  Constitutional: WDWN resting comfortably in bed; NAD  HEENT: NC/AT, EOMI, anicteric sclera, MMM  Neck: supple; no lymphadenopathy  Respiratory: CTA B/L; no W/R/R, no retractions  Cardiac: +S1/S2; RRR; no M/R/G;   Gastrointestinal: soft, NT/ND; no rebound or guarding; +BSx4  Back: spine midline, no bony tenderness or step-offs; no CVAT B/L  Extremities: WWP, no clubbing or cyanosis; no peripheral edema  Musculoskeletal: NROM x4; no joint swelling, tenderness or erythema  Vascular: 2+ radial, DP/PT pulses B/L  Dermatologic: skin warm, dry and intact; no rashes, wounds, or scars  Neurologic: AAOx2; CNII-XII grossly intact; no focal deficits  Psychiatric: affect and characteristics of appearance, verbalizations, behaviors are appropriate    ALLERGIES:  No known allergies    MEDICATIONS  (STANDING):  cholecalciferol 1000 Unit(s) Oral daily  cyanocobalamin 1000 MICROGram(s) Oral daily  enoxaparin Injectable 40 milliGRAM(s) SubCutaneous every 24 hours  folic acid 1 milliGRAM(s) Oral daily  multivitamin 1 Tablet(s) Oral daily  nystatin Cream 1 Application(s) Topical two times a day  polyethylene glycol 3350 17 Gram(s) Oral daily  senna 1 Tablet(s) Oral daily  tamsulosin 0.4 milliGRAM(s) Oral at bedtime  thiamine 100 milliGRAM(s) Oral daily    MEDICATIONS  (PRN):  None    LABS:                        12.3   7.21  )-----------( 227      ( 17 Sep 2019 07:03 )             37.7     09-17    135  |  99  |  11  ----------------------------<  93  4.1   |  29  |  0.84    Ca    8.8      17 Sep 2019 07:03  Phos  4.3     09-16  Mg     2.0     -    TPro  6.0  /  Alb  3.1<L>  /  TBili  0.2  /  DBili  x   /  AST  13  /  ALT  10  /  AlkPhos  45  09-16    PT/INR - ( 15 Sep 2019 17:05 )   PT: 11.9 sec;   INR: 1.05     PTT - ( 15 Sep 2019 17:05 )  PTT:30.9 sec  Urinalysis Basic - ( 15 Sep 2019 18:33 )    Color: Yellow / Appearance: Clear / S.010 / pH: x  Gluc: x / Ketone: NEGATIVE  / Bili: Negative / Urobili: 0.2 E.U./dL   Blood: x / Protein: NEGATIVE mg/dL / Nitrite: NEGATIVE   Leuk Esterase: NEGATIVE / RBC: < 5 /HPF / WBC < 5 /HPF   Sq Epi: x / Non Sq Epi: 0-5 /HPF / Bacteria: Present /HPF    RADIOLOGY & ADDITIONAL TESTS: Reviewed. 67 yo man w/ PMHx of alcohol withdrawal seizures, metabolic encephalopathy, hyponatremia, UTI w/ urinary retention and recent [facility]O placement (s/p admission 2019 for metabolic encephalopathy/acute urinary retention) presents w/ inability to walk 2/2 to weakness in lower extremities.    OVERNIGHT EVENTS: None    SUBJECTIVE/INTERVAL HPI: Patient seen and examined at bedside. Patient states that he is eating and sleeping well but endorses new, mild lower back pain associated with pain in his left lower extremity. Also had one episode where he urinated in the bed, but reports he knew he had to, but was just unable to make it to urinal.     REVIEW OF SYSTEMS:  CONSTITUTIONAL: No weakness, fevers or chills.   NECK: No pain or stiffness  RESPIRATORY: No cough, wheezing, hemoptysis; No shortness of breath  CARDIOVASCULAR: No chest pain or palpitations  GASTROINTESTINAL: No abdominal or epigastric pain. No nausea, vomiting, or hematemesis; No diarrhea or constipation. No melena or hematochezia.  GENITOURINARY: No dysuria, frequency or hematuria  EXTREMITIES: +Left lower extremity pain  NEUROLOGICAL: No numbness or weakness  SKIN: No itching, burning, rashes, or lesions   All other review of systems is negative unless indicated above.    VITAL SIGNS:  Vital Signs Last 24 Hrs  T(C): 36.9 (17 Sep 2019 05:36), Max: 36.9 (17 Sep 2019 05:36)  T(F): 98.4 (17 Sep 2019 05:36), Max: 98.4 (17 Sep 2019 05:36)  HR: 87 (17 Sep 2019 05:36) (71 - 87)  BP: 118/76 (17 Sep 2019 05:36) (95/65 - 121/76)  RR: 18 (17 Sep 2019 05:36) (18 - 18)  SpO2: 98% (17 Sep 2019 05:36) (96% - 99%)    PHYSICAL EXAM:  Constitutional: WDWN resting comfortably in bed; NAD  HEENT: NC/AT, EOMI, anicteric sclera, MMM  Neck: supple; no lymphadenopathy  Respiratory: CTA B/L; no W/R/R, no retractions  Cardiac: +S1/S2; RRR; no M/R/G;   Gastrointestinal: soft, NT/ND; no rebound or guarding; +BSx4  Back: spine midline, no bony tenderness or step-offs; no CVAT B/L  Extremities: WWP, no clubbing or cyanosis; no peripheral edema  Musculoskeletal: NROM x4; no joint swelling, tenderness or erythema  Vascular: 2+ radial, DP/PT pulses B/L  Dermatologic: skin warm, dry and intact; no rashes, wounds, or scars  Neurologic: Neurologic: AAOx2 (person, year, medical setting); CNII-XII grossly intact- irregular R. pupil (baseline 2/2 trauma); motor strength 5/5 throughout, sensation to light touch intact throughout. Asymmetric Patellar reflexes (R>L), downgoing plantar, intact proprioception bilaterally  Psychiatric: affect and characteristics of appearance, verbalizations, behaviors are appropriate    ALLERGIES:  No known allergies    MEDICATIONS  (STANDING):  cholecalciferol 1000 Unit(s) Oral daily  cyanocobalamin 1000 MICROGram(s) Oral daily  enoxaparin Injectable 40 milliGRAM(s) SubCutaneous every 24 hours  folic acid 1 milliGRAM(s) Oral daily  multivitamin 1 Tablet(s) Oral daily  nystatin Cream 1 Application(s) Topical two times a day  polyethylene glycol 3350 17 Gram(s) Oral daily  senna 1 Tablet(s) Oral daily  tamsulosin 0.4 milliGRAM(s) Oral at bedtime  thiamine 100 milliGRAM(s) Oral daily    MEDICATIONS  (PRN):  None    LABS:                        12.3   7.21  )-----------( 227      ( 17 Sep 2019 07:03 )             37.7     09-17    135  |  99  |  11  ----------------------------<  93  4.1   |  29  |  0.84    Ca    8.8      17 Sep 2019 07:03  Phos  4.3     09-  Mg     2.0     -    TPro  6.0  /  Alb  3.1<L>  /  TBili  0.2  /  DBili  x   /  AST  13  /  ALT  10  /  AlkPhos  45  09-16    PT/INR - ( 15 Sep 2019 17:05 )   PT: 11.9 sec;   INR: 1.05     PTT - ( 15 Sep 2019 17:05 )  PTT:30.9 sec  Urinalysis Basic - ( 15 Sep 2019 18:33 )    Color: Yellow / Appearance: Clear / S.010 / pH: x  Gluc: x / Ketone: NEGATIVE  / Bili: Negative / Urobili: 0.2 E.U./dL   Blood: x / Protein: NEGATIVE mg/dL / Nitrite: NEGATIVE   Leuk Esterase: NEGATIVE / RBC: < 5 /HPF / WBC < 5 /HPF   Sq Epi: x / Non Sq Epi: 0-5 /HPF / Bacteria: Present /HPF    RADIOLOGY & ADDITIONAL TESTS: Reviewed.

## 2019-09-17 NOTE — DISCHARGE NOTE NURSING/CASE MANAGEMENT/SOCIAL WORK - NSDCFUADDAPPT_GEN_ALL_CORE_FT
Please follow up with your new Primary Care Provider at [facility] on 10/3 @ 10am. Visit the [address] office. If you need to make any changes to your appointment please call. Please bring a copy of this paper work with you.

## 2019-09-20 DIAGNOSIS — R29.898 OTHER SYMPTOMS AND SIGNS INVOLVING THE MUSCULOSKELETAL SYSTEM: ICD-10-CM

## 2019-09-20 DIAGNOSIS — B35.6 TINEA CRURIS: ICD-10-CM

## 2019-09-20 DIAGNOSIS — Z96.649 PRESENCE OF UNSPECIFIED ARTIFICIAL HIP JOINT: ICD-10-CM

## 2019-09-20 DIAGNOSIS — E55.9 VITAMIN D DEFICIENCY, UNSPECIFIED: ICD-10-CM

## 2019-09-20 DIAGNOSIS — M48.07 SPINAL STENOSIS, LUMBOSACRAL REGION: ICD-10-CM

## 2019-09-20 DIAGNOSIS — E43 UNSPECIFIED SEVERE PROTEIN-CALORIE MALNUTRITION: ICD-10-CM

## 2019-09-20 DIAGNOSIS — M48.061 SPINAL STENOSIS, LUMBAR REGION WITHOUT NEUROGENIC CLAUDICATION: ICD-10-CM

## 2019-09-20 DIAGNOSIS — M54.16 RADICULOPATHY, LUMBAR REGION: ICD-10-CM

## 2019-09-20 DIAGNOSIS — E53.8 DEFICIENCY OF OTHER SPECIFIED B GROUP VITAMINS: ICD-10-CM

## 2019-09-20 DIAGNOSIS — M54.17 RADICULOPATHY, LUMBOSACRAL REGION: ICD-10-CM

## 2019-10-03 ENCOUNTER — APPOINTMENT (OUTPATIENT)
Dept: INTERNAL MEDICINE | Facility: CLINIC | Age: 69
End: 2019-10-03

## 2019-10-03 PROBLEM — Z00.00 ENCOUNTER FOR PREVENTIVE HEALTH EXAMINATION: Status: ACTIVE | Noted: 2019-10-03

## 2019-10-13 PROCEDURE — 87086 URINE CULTURE/COLONY COUNT: CPT

## 2019-10-13 PROCEDURE — 86780 TREPONEMA PALLIDUM: CPT

## 2019-10-13 PROCEDURE — 84300 ASSAY OF URINE SODIUM: CPT

## 2019-10-13 PROCEDURE — 96360 HYDRATION IV INFUSION INIT: CPT

## 2019-10-13 PROCEDURE — 80048 BASIC METABOLIC PNL TOTAL CA: CPT

## 2019-10-13 PROCEDURE — 85610 PROTHROMBIN TIME: CPT

## 2019-10-13 PROCEDURE — 80307 DRUG TEST PRSMV CHEM ANLYZR: CPT

## 2019-10-13 PROCEDURE — 93306 TTE W/DOPPLER COMPLETE: CPT

## 2019-10-13 PROCEDURE — 71045 X-RAY EXAM CHEST 1 VIEW: CPT

## 2019-10-13 PROCEDURE — 99285 EMERGENCY DEPT VISIT HI MDM: CPT | Mod: 25

## 2019-10-13 PROCEDURE — 84484 ASSAY OF TROPONIN QUANT: CPT

## 2019-10-13 PROCEDURE — 36415 COLL VENOUS BLD VENIPUNCTURE: CPT

## 2019-10-13 PROCEDURE — 80053 COMPREHEN METABOLIC PANEL: CPT

## 2019-10-13 PROCEDURE — 83735 ASSAY OF MAGNESIUM: CPT

## 2019-10-13 PROCEDURE — 80185 ASSAY OF PHENYTOIN TOTAL: CPT

## 2019-10-13 PROCEDURE — 70450 CT HEAD/BRAIN W/O DYE: CPT

## 2019-10-13 PROCEDURE — 84443 ASSAY THYROID STIM HORMONE: CPT

## 2019-10-13 PROCEDURE — 93005 ELECTROCARDIOGRAM TRACING: CPT

## 2019-10-13 PROCEDURE — 85730 THROMBOPLASTIN TIME PARTIAL: CPT

## 2019-10-13 PROCEDURE — 97162 PT EVAL MOD COMPLEX 30 MIN: CPT

## 2019-10-13 PROCEDURE — 81003 URINALYSIS AUTO W/O SCOPE: CPT

## 2019-10-13 PROCEDURE — 82607 VITAMIN B-12: CPT

## 2019-10-13 PROCEDURE — 85025 COMPLETE CBC W/AUTO DIFF WBC: CPT

## 2019-10-13 PROCEDURE — 84100 ASSAY OF PHOSPHORUS: CPT

## 2019-10-13 PROCEDURE — 85027 COMPLETE CBC AUTOMATED: CPT

## 2019-10-13 PROCEDURE — 82746 ASSAY OF FOLIC ACID SERUM: CPT

## 2019-10-13 PROCEDURE — 86803 HEPATITIS C AB TEST: CPT

## 2019-11-01 NOTE — ED ADULT NURSE NOTE - NSFALLRSKASSESASSIST_ED_ALL_ED
yes Niacinamide Counseling: I recommended taking niacin or niacinamide, also know as vitamin B3, twice daily. Recent evidence suggests that taking vitamin B3 (500 mg twice daily) can reduce the risk of actinic keratoses and non-melanoma skin cancers. Side effects of vitamin B3 include flushing and headache.

## 2020-09-10 NOTE — PROGRESS NOTE ADULT - SUBJECTIVE AND OBJECTIVE BOX
Speech Therapy Daily Treatment    Visit Count: 4  Insurance: $[id number] MEDICARE CAP/$0 USED TOWARD 2020 BENEFITS  Referred by: Rossy Pimentel MD, next visit (if known): unknown  Medical Diagnosis (from order):       434.91 (ICD-9-CM) - I63.9 (ICD-10-CM) - Cerebral infarction (CMS/[facility])   784.59 (ICD-9-CM) - R47.89 (ICD-10-CM) - Other speech disturbances      Treatment Diagnosis: Slurred Speech  Date of Onset/Injury: June 2019  Precautions: Cerebrovascular Accident (CVA) , Fall Risk  Chart reviewed: Relevant co-morbidities, allergies, tests and medications:  MRI     SUBJECTIVE:    Pt arrived to session by self. Pt stating \"Ah's were a little bit better\" and felt reading went better. Normal conversation with family goes \"up and down.\" Notes that family reminds him to be louder.  Pain: patient reports pain is not an issue/concern    OBJECTIVE:      Hobbies/interests:  Playing with grand kids (ages 2-10 years): board games, kids like to swim (has a pool, daughter is swim teacher), likes to read to them (voice gives out on long stories or multiple books), sees every 1-2 weeks  Retired civil engineering and   Sports: Carlene Banks    Treatment     Sustained phonation: DB SPL- average: 75.9 dB, range: 72-81 dB, duration- average: 24.3 seconds, range: 18-27 seconds  Vocal quality: mild roughness intermittently  Cues: min cues breath support, mouth opening, quality, reducing duration  Effort: 9/10  Dual Task (if applicable): N/A    Pitch variation high \"ah\": DB SPL- average: 80.8 dB, range: 70-83 dB  Vocal quality: min strain in higher pitch  Cues: min cues to increase pitch range and reduce tension/strain in throat  Effort: 8/10  Dual Task (if applicable):    Pitch variation low \"ah\": DB SPL- average: 81.1 dB, range: 77-85 dB  Vocal quality: WFL  Cues: min cues to reduce range lower  Effort: 8/10  Dual Task (if applicable): N/A    Functional phrases: DB SPL average: 77.5 dB, range: 68-84 dB  Vocal  Patient is a 67y old  Male who presents with a chief complaint of Left leg pain (17 Mar 2018 16:01)      INTERVAL HPI/OVERNIGHT EVENTS:  no events, pain controlled    Review of Systems: 12 point review of systems otherwise negative  ( - )fevers/chills  ( - ) dyspnea  ( - ) cough  ( - ) chest pain  ( - ) palpatations  ( - ) dizziness/lightheadedness  ( - ) nausea/vomiting  ( - ) abd pain  ( - ) diarrhea  ( - ) melena  ( - ) hematochezia  ( - ) dysuria  ( - ) hematuria  ( - ) leg swelling  ( -) calf tenderness  ( - ) motor weakness  ( - ) extremity numbness  ( - ) back pain  ( + ) tolerating POs  ( + ) BM    MEDICATIONS  (STANDING):  cholecalciferol 1000 Unit(s) Oral daily  docusate sodium 100 milliGRAM(s) Oral two times a day  folic acid 1 milliGRAM(s) Oral daily  heparin  Injectable 5000 Unit(s) SubCutaneous every 8 hours  multivitamin 1 Tablet(s) Oral daily  phenytoin   Capsule 30 milliGRAM(s) Oral daily  prednisoLONE acetate 1% Suspension 1 Drop(s) Right EYE daily  senna 2 Tablet(s) Oral at bedtime  sodium chloride 0.9%. 1000 milliLiter(s) (70 mL/Hr) IV Continuous <Continuous>  thiamine 100 milliGRAM(s) Oral daily  timolol 0.5% Solution 1 Drop(s) Both EYES two times a day    MEDICATIONS  (PRN):  acetaminophen   Tablet. 650 milliGRAM(s) Oral every 6 hours PRN Moderate Pain (4 - 6)  oxyCODONE    IR 5 milliGRAM(s) Oral every 6 hours PRN Severe Pain (7 - 10)      Allergies    No Known Allergies    Intolerances          Vital Signs Last 24 Hrs  T(C): 37.1 (18 Mar 2018 09:28), Max: 37.5 (17 Mar 2018 22:42)  T(F): 98.8 (18 Mar 2018 09:28), Max: 99.5 (17 Mar 2018 22:42)  HR: 84 (18 Mar 2018 09:28) (68 - 89)  BP: 149/83 (18 Mar 2018 09:28) (110/71 - 162/100)  BP(mean): --  RR: 17 (18 Mar 2018 09:28) (17 - 19)  SpO2: 97% (18 Mar 2018 09:28) (97% - 98%)  CAPILLARY BLOOD GLUCOSE            Physical Exam:    Daily     Daily   General:  Well appearing, NAD, not cachetic  HEENT:  Nonicteric, PERRLA  CV:  RRR, no murmur, no JVD  Lungs:  CTA B/L, no wheezes, rales, rhonchi  Abdomen:  Soft, non-tender, no distended, positive BS, no hepatosplenomegaly  Extremities:  2+ pulses, no c/c, no edema  Skin:  Warm and dry, no rashes  :  No clement  Neuro:  AAOx3, non-focal, CN II-XII grossly intact  No Restraints    LABS:                        14.8   6.6   )-----------( 222      ( 17 Mar 2018 05:56 )             41.0     03-18    129<L>  |  87<L>  |  7   ----------------------------<  132<H>  4.1   |  33<H>  |  0.87    Ca    8.9      18 Mar 2018 06:37  Phos  2.2     03-17  Mg     2.0     03-18    TPro  7.4  /  Alb  4.1  /  TBili  0.8  /  DBili  x   /  AST  41<H>  /  ALT  21  /  AlkPhos  62  03-16    PT/INR - ( 16 Mar 2018 19:35 )   PT: 12.0 sec;   INR: 1.08          PTT - ( 16 Mar 2018 19:35 )  PTT:27.5 sec  Urinalysis Basic - ( 17 Mar 2018 02:39 )    Color: Yellow / Appearance: Clear / S.010 / pH: x  Gluc: x / Ketone: Trace mg/dL  / Bili: Negative / Urobili: 1.0 E.U./dL   Blood: x / Protein: NEGATIVE mg/dL / Nitrite: NEGATIVE   Leuk Esterase: NEGATIVE / RBC: x / WBC x   Sq Epi: x / Non Sq Epi: x / Bacteria: x          RADIOLOGY & ADDITIONAL TESTS:    ---------------------------------------------------------------------------  I personally reviewed: [  ]EKG   [  ]CXR    [  ] CT    [  ]Other  ---------------------------------------------------------------------------  PLEASE CHECK WHEN PRESENT:     [  ]Heart Failure     [  ] Acute     [  ] Acute on Chronic     [  ] Chronic  -------------------------------------------------------------------     [  ]Diastolic [HFpEF]     [  ]Systolic [HFrEF]     [  ]Combined [HFpEF & HFrEF]     [  ]Other:  -------------------------------------------------------------------  [  ]GURINDER     [  ]ATN     [  ]Reneal Medullary Necrosis     [  ]Renal Cortical Necrosis     [  ]Other Pathological Lesions:    [  ]CKD 1  [  ]CKD 2  [  ]CKD 3  [  ]CKD 4  [  ]CKD 5  [  ]Other  -------------------------------------------------------------------  [  ]Other/Unspecified:    --------------------------------------------------------------------    Abdominal Nutritional Status  [  ]Malnutrition: See Nutrition Note  [  ]Cachexia  [  ]Other:   [  ]Supplement Ordered:  [  ]Morbid Obesity (BMI >=40] quality: WFL  Cues: min cues  Effort: 7/10  Dual Task (if applicable): 7/10    Hierarchical Speech Loudness Drills (sentence level)  Stimulus: sentence level reading material and Q&A  DB SPL: average: 72.4 dB, range: 65-80 dB  Vocal quality: mild roughness  Cues: min during reading, mod during convo  Effort: 7/10 for reading, 8/10 for spontaneous  Dual Task (if applicable): NA    Current Home Program (not performed this date except as noted above):  Exercises: Vocal Loudness Exercises   HEP: Date Given:   Sustained Phonation x6  High/low x6 each  Functional phrases x1  Reading word/phrase material 5-10 minutes  Loud greeting to son and when checking in for speech therapy 9/1/20   Cont. Exercises above  Loud greeting to son and when checking our and in for speech therapy 9/8/20                                   ASSESSMENT:    Pt seen for speech therapy. Pt requiring min cues to maintain adequate loudness during sentence level reading tasks, increase to mod cues with spontaneous tasks. Reduced loudness noted in conversation which negatively impacts speech intelligibility. Continues to benefit from skilled ST.    Result of above outlined education: Verbalizes understanding and Demonstrates understanding    Goals:   To be obtained by end of this plan of care:  1. Pt will complete sustained phonation task while maintaining average 75 dB SPL or greater without cues to improve vocal loudness for communication in ADLs/IADLs.  2. Pt will read 10 functional phrases demonstrating average 75 dB SPL or greater without cues to improve vocal loudness for communication in ADLs/IADLs.  3. Pt will read paragraphs demonstrating average 75 dB SPL or greater (from 63 dB at time of initial evaluation) without cues to improve vocal loudness for communication in ADLs/IADLs.  4. Pt will communicate at the conversational level demonstrating average 75 dB SPL or greater (from 53 dB at time of initial evaluation) without cues to improve vocal  loudness for communication in ADLs/IADLs.    PLAN:    Suggestions for next session as indicated: sustained phonation, high/low ah's, functional phrases, out loud reading/spontaneous tasks at word/phrase, increase to sentence level as able    Procedures and total treatment time documented in Time Entry flowsheet.

## 2020-11-17 NOTE — DISCHARGE NOTE PROVIDER - NSFOLLOWUPCLINICS_GEN_ALL_ED_FT
[facility]  Urology  [address] [place], [address]  [place], [place] [address]  Phone: [phone]  Fax:   Follow Up Time: W Plasty Text: The lesion was extirpated to the level of the fat with a #15 scalpel blade.  Given the location of the defect, shape of the defect and the proximity to free margins a W-plasty was deemed most appropriate for repair.  Using a sterile surgical marker, the appropriate transposition arms of the W-plasty were drawn incorporating the defect and placing the expected incisions within the relaxed skin tension lines where possible.    The area thus outlined was incised deep to adipose tissue with a #15 scalpel blade.  The skin margins were undermined to an appropriate distance in all directions utilizing iris scissors.  The opposing transposition arms were then transposed into place in opposite direction and anchored with interrupted buried subcutaneous sutures.

## 2020-12-20 ENCOUNTER — INPATIENT (INPATIENT)
Facility: HOSPITAL | Age: 70
LOS: 2 days | Discharge: NURSING, REHAB OR LONG-TERM CARE | DRG: 641 | End: 2020-12-23
Attending: STUDENT IN AN ORGANIZED HEALTH CARE EDUCATION/TRAINING PROGRAM | Admitting: HOSPITALIST
Payer: MEDICARE

## 2020-12-20 VITALS
HEART RATE: 122 BPM | OXYGEN SATURATION: 98 % | DIASTOLIC BLOOD PRESSURE: 68 MMHG | RESPIRATION RATE: 18 BRPM | HEIGHT: 69 IN | WEIGHT: 149.91 LBS | TEMPERATURE: 98 F | SYSTOLIC BLOOD PRESSURE: 97 MMHG

## 2020-12-20 DIAGNOSIS — R63.8 OTHER SYMPTOMS AND SIGNS CONCERNING FOOD AND FLUID INTAKE: ICD-10-CM

## 2020-12-20 DIAGNOSIS — R26.2 DIFFICULTY IN WALKING, NOT ELSEWHERE CLASSIFIED: ICD-10-CM

## 2020-12-20 DIAGNOSIS — R29.898 OTHER SYMPTOMS AND SIGNS INVOLVING THE MUSCULOSKELETAL SYSTEM: ICD-10-CM

## 2020-12-20 DIAGNOSIS — R13.10 DYSPHAGIA, UNSPECIFIED: ICD-10-CM

## 2020-12-20 DIAGNOSIS — Z96.649 PRESENCE OF UNSPECIFIED ARTIFICIAL HIP JOINT: Chronic | ICD-10-CM

## 2020-12-20 DIAGNOSIS — E87.1 HYPO-OSMOLALITY AND HYPONATREMIA: ICD-10-CM

## 2020-12-20 DIAGNOSIS — R79.89 OTHER SPECIFIED ABNORMAL FINDINGS OF BLOOD CHEMISTRY: ICD-10-CM

## 2020-12-20 DIAGNOSIS — R94.31 ABNORMAL ELECTROCARDIOGRAM [ECG] [EKG]: ICD-10-CM

## 2020-12-20 PROBLEM — F10.10 ALCOHOL ABUSE, UNCOMPLICATED: Chronic | Status: ACTIVE | Noted: 2019-09-15

## 2020-12-20 PROBLEM — N39.0 URINARY TRACT INFECTION, SITE NOT SPECIFIED: Chronic | Status: ACTIVE | Noted: 2019-09-15

## 2020-12-20 LAB
A1C WITH ESTIMATED AVERAGE GLUCOSE RESULT: 5.3 % — SIGNIFICANT CHANGE UP (ref 4–5.6)
ALBUMIN SERPL ELPH-MCNC: 2.5 G/DL — LOW (ref 3.3–5)
ALBUMIN SERPL ELPH-MCNC: 3 G/DL — LOW (ref 3.3–5)
ALP SERPL-CCNC: 64 U/L — SIGNIFICANT CHANGE UP (ref 40–120)
ALP SERPL-CCNC: 72 U/L — SIGNIFICANT CHANGE UP (ref 40–120)
ALT FLD-CCNC: 14 U/L — SIGNIFICANT CHANGE UP (ref 10–45)
ALT FLD-CCNC: SIGNIFICANT CHANGE UP (ref 10–45)
ANION GAP SERPL CALC-SCNC: 12 MMOL/L — SIGNIFICANT CHANGE UP (ref 5–17)
ANION GAP SERPL CALC-SCNC: 15 MMOL/L — SIGNIFICANT CHANGE UP (ref 5–17)
APPEARANCE UR: CLEAR — SIGNIFICANT CHANGE UP
APTT BLD: 28.8 SEC — SIGNIFICANT CHANGE UP (ref 27.5–35.5)
AST SERPL-CCNC: 25 U/L — SIGNIFICANT CHANGE UP (ref 10–40)
AST SERPL-CCNC: SIGNIFICANT CHANGE UP (ref 10–40)
BACTERIA # UR AUTO: PRESENT /HPF
BASOPHILS # BLD AUTO: 0.08 K/UL — SIGNIFICANT CHANGE UP (ref 0–0.2)
BASOPHILS NFR BLD AUTO: 0.8 % — SIGNIFICANT CHANGE UP (ref 0–2)
BILIRUB SERPL-MCNC: 0.7 MG/DL — SIGNIFICANT CHANGE UP (ref 0.2–1.2)
BILIRUB SERPL-MCNC: 0.8 MG/DL — SIGNIFICANT CHANGE UP (ref 0.2–1.2)
BILIRUB UR-MCNC: ABNORMAL
BUN SERPL-MCNC: 11 MG/DL — SIGNIFICANT CHANGE UP (ref 7–23)
BUN SERPL-MCNC: 12 MG/DL — SIGNIFICANT CHANGE UP (ref 7–23)
CALCIUM SERPL-MCNC: 8.1 MG/DL — LOW (ref 8.4–10.5)
CALCIUM SERPL-MCNC: 9 MG/DL — SIGNIFICANT CHANGE UP (ref 8.4–10.5)
CHLORIDE SERPL-SCNC: 90 MMOL/L — LOW (ref 96–108)
CHLORIDE SERPL-SCNC: 94 MMOL/L — LOW (ref 96–108)
CO2 SERPL-SCNC: 26 MMOL/L — SIGNIFICANT CHANGE UP (ref 22–31)
CO2 SERPL-SCNC: 28 MMOL/L — SIGNIFICANT CHANGE UP (ref 22–31)
COLOR SPEC: YELLOW — SIGNIFICANT CHANGE UP
COMMENT - URINE: SIGNIFICANT CHANGE UP
CREAT SERPL-MCNC: 0.67 MG/DL — SIGNIFICANT CHANGE UP (ref 0.5–1.3)
CREAT SERPL-MCNC: 0.68 MG/DL — SIGNIFICANT CHANGE UP (ref 0.5–1.3)
DIFF PNL FLD: NEGATIVE — SIGNIFICANT CHANGE UP
EOSINOPHIL # BLD AUTO: 0.1 K/UL — SIGNIFICANT CHANGE UP (ref 0–0.5)
EOSINOPHIL NFR BLD AUTO: 1 % — SIGNIFICANT CHANGE UP (ref 0–6)
EPI CELLS # UR: SIGNIFICANT CHANGE UP /HPF (ref 0–5)
ESTIMATED AVERAGE GLUCOSE: 105 MG/DL — SIGNIFICANT CHANGE UP (ref 68–114)
ETHANOL SERPL-MCNC: <10 MG/DL — SIGNIFICANT CHANGE UP (ref 0–10)
GLUCOSE SERPL-MCNC: 177 MG/DL — HIGH (ref 70–99)
GLUCOSE SERPL-MCNC: 196 MG/DL — HIGH (ref 70–99)
GLUCOSE UR QL: NEGATIVE — SIGNIFICANT CHANGE UP
HCT VFR BLD CALC: 46.2 % — SIGNIFICANT CHANGE UP (ref 39–50)
HGB BLD-MCNC: 15.4 G/DL — SIGNIFICANT CHANGE UP (ref 13–17)
IMM GRANULOCYTES NFR BLD AUTO: 0.9 % — SIGNIFICANT CHANGE UP (ref 0–1.5)
INR BLD: 1.18 — HIGH (ref 0.88–1.16)
KETONES UR-MCNC: NEGATIVE — SIGNIFICANT CHANGE UP
LACTATE SERPL-SCNC: 2.1 MMOL/L — HIGH (ref 0.5–2)
LACTATE SERPL-SCNC: 3 MMOL/L — HIGH (ref 0.5–2)
LACTATE SERPL-SCNC: 4.7 MMOL/L — CRITICAL HIGH (ref 0.5–2)
LEUKOCYTE ESTERASE UR-ACNC: NEGATIVE — SIGNIFICANT CHANGE UP
LYMPHOCYTES # BLD AUTO: 1.75 K/UL — SIGNIFICANT CHANGE UP (ref 1–3.3)
LYMPHOCYTES # BLD AUTO: 17.1 % — SIGNIFICANT CHANGE UP (ref 13–44)
MCHC RBC-ENTMCNC: 31.7 PG — SIGNIFICANT CHANGE UP (ref 27–34)
MCHC RBC-ENTMCNC: 33.3 GM/DL — SIGNIFICANT CHANGE UP (ref 32–36)
MCV RBC AUTO: 95.1 FL — SIGNIFICANT CHANGE UP (ref 80–100)
MONOCYTES # BLD AUTO: 0.79 K/UL — SIGNIFICANT CHANGE UP (ref 0–0.9)
MONOCYTES NFR BLD AUTO: 7.7 % — SIGNIFICANT CHANGE UP (ref 2–14)
NEUTROPHILS # BLD AUTO: 7.43 K/UL — HIGH (ref 1.8–7.4)
NEUTROPHILS NFR BLD AUTO: 72.5 % — SIGNIFICANT CHANGE UP (ref 43–77)
NITRITE UR-MCNC: POSITIVE
NRBC # BLD: 0 /100 WBCS — SIGNIFICANT CHANGE UP (ref 0–0)
OSMOLALITY SERPL: 287 MOSM/KG — SIGNIFICANT CHANGE UP (ref 280–301)
PH UR: 6 — SIGNIFICANT CHANGE UP (ref 5–8)
PLATELET # BLD AUTO: 397 K/UL — SIGNIFICANT CHANGE UP (ref 150–400)
POTASSIUM SERPL-MCNC: 3 MMOL/L — LOW (ref 3.5–5.3)
POTASSIUM SERPL-MCNC: SIGNIFICANT CHANGE UP (ref 3.5–5.3)
POTASSIUM SERPL-SCNC: 3 MMOL/L — LOW (ref 3.5–5.3)
POTASSIUM SERPL-SCNC: SIGNIFICANT CHANGE UP (ref 3.5–5.3)
PROT SERPL-MCNC: 6.2 G/DL — SIGNIFICANT CHANGE UP (ref 6–8.3)
PROT SERPL-MCNC: 7.2 G/DL — SIGNIFICANT CHANGE UP (ref 6–8.3)
PROT UR-MCNC: NEGATIVE MG/DL — SIGNIFICANT CHANGE UP
PROTHROM AB SERPL-ACNC: 14.1 SEC — HIGH (ref 10.6–13.6)
RBC # BLD: 4.86 M/UL — SIGNIFICANT CHANGE UP (ref 4.2–5.8)
RBC # FLD: 13.7 % — SIGNIFICANT CHANGE UP (ref 10.3–14.5)
RBC CASTS # UR COMP ASSIST: < 5 /HPF — SIGNIFICANT CHANGE UP
SARS-COV-2 RNA SPEC QL NAA+PROBE: SIGNIFICANT CHANGE UP
SODIUM SERPL-SCNC: 132 MMOL/L — LOW (ref 135–145)
SODIUM SERPL-SCNC: 133 MMOL/L — LOW (ref 135–145)
SP GR SPEC: 1.02 — SIGNIFICANT CHANGE UP (ref 1–1.03)
UROBILINOGEN FLD QL: 1 E.U./DL — SIGNIFICANT CHANGE UP
WBC # BLD: 10.24 K/UL — SIGNIFICANT CHANGE UP (ref 3.8–10.5)
WBC # FLD AUTO: 10.24 K/UL — SIGNIFICANT CHANGE UP (ref 3.8–10.5)
WBC UR QL: < 5 /HPF — SIGNIFICANT CHANGE UP

## 2020-12-20 PROCEDURE — 99223 1ST HOSP IP/OBS HIGH 75: CPT

## 2020-12-20 PROCEDURE — 71045 X-RAY EXAM CHEST 1 VIEW: CPT | Mod: 26

## 2020-12-20 PROCEDURE — 72170 X-RAY EXAM OF PELVIS: CPT | Mod: 26

## 2020-12-20 PROCEDURE — 99285 EMERGENCY DEPT VISIT HI MDM: CPT

## 2020-12-20 PROCEDURE — 70450 CT HEAD/BRAIN W/O DYE: CPT | Mod: 26

## 2020-12-20 PROCEDURE — 74177 CT ABD & PELVIS W/CONTRAST: CPT | Mod: 26

## 2020-12-20 PROCEDURE — 93010 ELECTROCARDIOGRAM REPORT: CPT

## 2020-12-20 RX ORDER — POLYETHYLENE GLYCOL 3350 17 G/17G
17 POWDER, FOR SOLUTION ORAL DAILY
Refills: 0 | Status: DISCONTINUED | OUTPATIENT
Start: 2020-12-20 | End: 2020-12-23

## 2020-12-20 RX ORDER — SODIUM CHLORIDE 9 MG/ML
1000 INJECTION INTRAMUSCULAR; INTRAVENOUS; SUBCUTANEOUS ONCE
Refills: 0 | Status: COMPLETED | OUTPATIENT
Start: 2020-12-20 | End: 2020-12-20

## 2020-12-20 RX ORDER — SENNA PLUS 8.6 MG/1
1 TABLET ORAL AT BEDTIME
Refills: 0 | Status: DISCONTINUED | OUTPATIENT
Start: 2020-12-20 | End: 2020-12-23

## 2020-12-20 RX ORDER — TAMSULOSIN HYDROCHLORIDE 0.4 MG/1
0.4 CAPSULE ORAL AT BEDTIME
Refills: 0 | Status: DISCONTINUED | OUTPATIENT
Start: 2020-12-20 | End: 2020-12-21

## 2020-12-20 RX ORDER — POTASSIUM CHLORIDE 20 MEQ
40 PACKET (EA) ORAL ONCE
Refills: 0 | Status: COMPLETED | OUTPATIENT
Start: 2020-12-20 | End: 2020-12-20

## 2020-12-20 RX ORDER — ENOXAPARIN SODIUM 100 MG/ML
40 INJECTION SUBCUTANEOUS EVERY 24 HOURS
Refills: 0 | Status: DISCONTINUED | OUTPATIENT
Start: 2020-12-20 | End: 2020-12-23

## 2020-12-20 RX ADMIN — SODIUM CHLORIDE 1000 MILLILITER(S): 9 INJECTION INTRAMUSCULAR; INTRAVENOUS; SUBCUTANEOUS at 09:58

## 2020-12-20 RX ADMIN — TAMSULOSIN HYDROCHLORIDE 0.4 MILLIGRAM(S): 0.4 CAPSULE ORAL at 22:26

## 2020-12-20 RX ADMIN — ENOXAPARIN SODIUM 40 MILLIGRAM(S): 100 INJECTION SUBCUTANEOUS at 22:25

## 2020-12-20 RX ADMIN — SENNA PLUS 1 TABLET(S): 8.6 TABLET ORAL at 22:26

## 2020-12-20 RX ADMIN — SODIUM CHLORIDE 1000 MILLILITER(S): 9 INJECTION INTRAMUSCULAR; INTRAVENOUS; SUBCUTANEOUS at 09:01

## 2020-12-20 RX ADMIN — Medication 40 MILLIEQUIVALENT(S): at 12:06

## 2020-12-20 RX ADMIN — SODIUM CHLORIDE 1000 MILLILITER(S): 9 INJECTION INTRAMUSCULAR; INTRAVENOUS; SUBCUTANEOUS at 09:57

## 2020-12-20 RX ADMIN — SODIUM CHLORIDE 1000 MILLILITER(S): 9 INJECTION INTRAMUSCULAR; INTRAVENOUS; SUBCUTANEOUS at 13:06

## 2020-12-20 NOTE — ED PROVIDER NOTE - OBJECTIVE STATEMENT
70y gentleman with medical history of alcoholism w/ alcohol withdrawal seizures (now has been off etoh, no longer on dilatin), metabolic encephalopathy, hyponatremia who presents w/ inability to walk 2/2 to weakness. Has been in [facility] before, but currently living at home w/ partner who is no longer there as she is sick and admitted currently on 7uris since 12/15. Pt states unable to get himself food, super has been bringing him water. Today the super called EMS, pt may have been down and on floor since yesterday when he checked on him last. Pt poor historian, has trouble remembering timeline of when things happened. admits to some nausea, but denies chest pain, sob. denies any cardiac history. hasn't seen a doctor since his last admission here, not taking any meds. denies etoh use

## 2020-12-20 NOTE — H&P ADULT - PROBLEM SELECTOR PLAN 1
Pt is unable to state when he was last able to walk, states he is mostly in bed, he states whenever he has tried, he is unable to bear weight on R leg. 3+/5 to RLE and 4+ on LLE, Reflexes 2+ bilaterally and otherwise neurovascularly intact. Very low suspicion for cord compression/CVA. Upon chart review, patient was admitted with similar complaints in the past, CT lumbar spine with moderate bilateral neural foraminal narrowing from L1-S1. No previous neuro work up was done. Pt also generally weak likely in the setting of decreased appetite, decreased po intake (only drinking liquids)  - consider Neurology consult in AM and MR Lumbar spine   - PT consult  - fall precautions  - SW consult

## 2020-12-20 NOTE — ED PROVIDER NOTE - PHYSICAL EXAMINATION
CONSTITUTIONAL: disheveled appearing older male in no distress, fecal matter crusted in fingernails    HEAD: Normocephalic; atraumatic.   EYES:  conjunctiva and sclera clear  ENT: normal nose; no rhinorrhea; normal pharynx with no erythema or lesions.   NECK: Supple; non-tender;   CARDIOVASCULAR: Normal S1, S2; no murmurs, rubs, or gallops. Regular rate and rhythm.   RESPIRATORY: Breathing easily; breath sounds clear and equal bilaterally; no wheezes, rhonchi, or rales.  GI: Soft; non-distended; non-tender; no palpable organomegaly.   EXT: No cyanosis or edema; N/V intact, able to lift extremities equally but weak with crossing leg, approx 3/5 strength  SKIN: Normal for age and race; warm; dry; good turgor; no apparent lesions or rash.   NEURO: A & O x 3 but mixes up details of recent events; face symmetric; grossly unremarkable.   PSYCHOLOGICAL: The patient’s mood and manner are appropriate.
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
## 2020-12-20 NOTE — ED ADULT NURSE REASSESSMENT NOTE - NS ED NURSE REASSESS COMMENT FT1
Attempted to ambulate pt. Pt sat at edge of bed without difficulty. On attempting to stand pt was unable to lift full weight from the bed even with 2 assist. Pt appeared to melt at the hips and knees. Pt assisted back to bed, assisted to position of comfort, provided hot blanket. DISPLAY PLAN FREE TEXT

## 2020-12-20 NOTE — H&P ADULT - PROBLEM SELECTOR PLAN 7
: No IVF  E: Replete K>4 Mag>2  N: Full liquid diet  DVT: Lovenox 40mg Q24H  GI: not needed  Dispo: RMF, PT consult, SW : No IVF  E: Replete K>4 Mag>2  N: Full liquid diet  DVT: Lovenox 40mg Q24H  GI: not needed  Code; FULL  Dispo: RMF, PT consult, SW

## 2020-12-20 NOTE — H&P ADULT - NSHPPHYSICALEXAM_GEN_ALL_CORE
VITAL SIGNS:  T(C): 36.6 (12-20-20 @ 13:10), Max: 36.7 (12-20-20 @ 08:57)  T(F): 97.9 (12-20-20 @ 13:10), Max: 98.1 (12-20-20 @ 08:57)  HR: 98 (12-20-20 @ 13:10) (89 - 122)  BP: 108/73 (12-20-20 @ 13:10) (97/68 - 127/90)  BP(mean): --  RR: 18 (12-20-20 @ 13:10) (17 - 18)  SpO2: 100% (12-20-20 @ 13:10) (98% - 100%)  Wt(kg): --    PHYSICAL EXAM:    Constitutional: WDWN, lying comfortably in bed, NAD  Head: Nc/At  Eyes: PERRL, EOMI, clear conjunctiva  ENT: no nasal discharge; uvula midline, no oropharyngeal erythema or exudates; MMM  Neck: supple; no JVD or thyromegaly  Respiratory: CTA b/l, no wheezes, rales, or rhonchi  Cardiac: +S1/S2, +RRR, no murmurs, rubs, or gallops  Gastrointestinal: soft, non-tender, non-distended, no rebound/guarding, no palpable masses, normoactive bowel sounds x4  Back: spine midline, no bony tenderness or step-offs; no CVAT B/L  Extremities: WWP, no clubbing or cyanosis, no peripheral edema  Musculoskeletal: NROM x4; no joint swelling, tenderness or erythema  Vascular: 2+ radial and DP pulses b/l  Dermatologic: skin warm, dry and intact, no rashes, wounds, or scars  Lymphatic: no submandibular or cervical LAD  Neurologic: AAOx3, CNII-XII grossly intact, no focal deficits  Psychiatric: affect and characteristics of appearance, verbalizations, behaviors are appropriate VITAL SIGNS:  T(C): 36.6 (12-20-20 @ 13:10), Max: 36.7 (12-20-20 @ 08:57)  T(F): 97.9 (12-20-20 @ 13:10), Max: 98.1 (12-20-20 @ 08:57)  HR: 98 (12-20-20 @ 13:10) (89 - 122)  BP: 108/73 (12-20-20 @ 13:10) (97/68 - 127/90)  BP(mean): --  RR: 18 (12-20-20 @ 13:10) (17 - 18)  SpO2: 100% (12-20-20 @ 13:10) (98% - 100%)  Wt(kg): --    PHYSICAL EXAM:    Constitutional: appears older than stated age, disheveled,  male, resting comfortably in no acute distress   Head: Nc/At  Eyes: PERRL, EOMI, clear conjunctiva  ENT: no nasal discharge; uvula midline, no oropharyngeal erythema or exudates; dry mucus membranes  Neck: supple; no JVD or thyromegaly  Respiratory: CTA b/l, no wheezes, rales, or rhonchi  Cardiac: +S1/S2, +RRR, no murmurs, rubs, or gallops  Gastrointestinal: soft, non-tender, non-distended, no rebound/guarding, no palpable masses, normoactive bowel sounds x4  Back: spine midline, no bony tenderness or step-offs; no CVAT B/L  Extremities: WWP, no clubbing or cyanosis, no peripheral edema  Musculoskeletal: NROM x4; no joint swelling, tenderness or erythema  Vascular: 2+ radial and DP pulses b/l  Dermatologic: skin warm, dry and intact, no rashes, wounds, or scars  Lymphatic: no submandibular or cervical LAD  Neurologic: AAOx3, CNII-XII grossly intact  5/5 strength in bilateral proximal and distal upper extremities  4+/5 strength to LLE, 3+/5 strength to RLE upon hip flexion and knee flexion   Psychiatric: affect and characteristics of appearance, verbalizations, behaviors are appropriate

## 2020-12-20 NOTE — H&P ADULT - HISTORY OF PRESENT ILLNESS
70M with PMH alcoholism with alcohol withdrawal seizures (now sober for unspecified period of time), metabolic encephalopathy, hyponatremia, who presents to the ED after being BIBA for failure to thrive. Of note, patient's signficant other is also admitted to the hospital, and due to her concerns for her inability to take care of him, social work/CM called 911 3 days ago. He had a wellness check but was not brought to the ED at that time. Of note, he has been mostly "bed bound" due to inability to bear weight on R leg, when prompted about reasons, he is evasive and states he does not remember and asks for us to look at his previous chart. Patient is not a reliable historian, however he is alert and oriented x3. He states he has been feeling fine overall with no acute complaints but he has not been able to get up out of bed, his super has been bringing him water/food. Today the super called EMS, because patient was found down on the floor at least since yesterday (which was when he last checked on him). Patient has trouble remembering the timeline of his symptoms, and when the last time he was seen by a doctor. Upon review of systems, he admits to difficulty with swallowing solid foods, but no problems with fluids. He states he usually vomits when attempting to eat solid food. He is unable to say duration of symptoms. He does report unspecified amount of weight loss, denies any fevers, chills, chest pain, chest tightness, abdominal pain, diarrhea, constipation, melena, hematochezia, dysuria, numbness, weakness, joint pain, joint swelling, lightheadedness, dizziness, or any other complaints at this time.     In the ED: Initial vital signs: T: 97/68 F, HR: 122 , BP: 97/68 mmHg, R: 18, SpO2: 98% on RA  ED course: s/p 3L NS, PO 40mEq KCl x1  Labs: significant for no leukocytosis, BMP with Na 133   Imaging: CXR: No acute infiltrates   XR pelvis: s/p L hip replacement, no acute fracture or dislocation   CT Abd: mild bowel wall thickening, in ascending and proxinmal transverse colon, may reflect chronic colitis. mild to moderate distended stomach.   EKG: Sinus tachycardia , TWI in inferior and lateral leads,   Medications: none   Consults: none  70M with PMH alcoholism with alcohol withdrawal seizures (now sober for unspecified period of time), metabolic encephalopathy, hyponatremia, who presents to the ED after being BIBA for failure to thrive and deconditioning in setting of inability to walk. Of note, patient's significant other is also admitted to the hospital, and due to her concerns for her inability to take care of him, social work/ called 911 3 days ago. He had a wellness check but was not brought to the ED at that time. Of note, he has been mostly "bed bound" due to inability to bear weight on R leg, when prompted about reasons, he is evasive and states he does not remember and asks for us to look at his previous chart. Patient is not a reliable historian, however he is alert and oriented x3. He states he has been feeling fine overall with no acute complaints but he has not been able to get up out of bed, his super has been bringing him water/food. Today the super called EMS, because patient was found down on the floor at least since yesterday (which was when he last checked on him). Patient has trouble remembering the timeline of his symptoms, and when the last time he was seen by a doctor. Upon review of systems, he admits to difficulty with swallowing solid foods, but no problems with fluids. He states he usually vomits when attempting to eat solid food. He is unable to say duration of symptoms. He does report unspecified amount of weight loss, denies any fevers, chills, chest pain, chest tightness, abdominal pain, diarrhea, constipation, melena, hematochezia, dysuria, numbness,  joint pain, joint swelling, lightheadedness, dizziness, or any other complaints at this time. Of note patient was admitted back from 09/15/19-09/17/19 with similar presentation, at that time pt had come in from [facility] with LLE weakness and pain. At that time patient underwent CT lumbar as well, which revealed moderate canal stenosis and moderate neural foraminal narrowing. He was discharged to [facility] at that time.     In the ED: Initial vital signs: T: , HR: 122 , BP: 97/68 mmHg, R: 18, SpO2: 98% on RA  ED course: s/p 3L NS, PO 40mEq KCl x1  Labs: significant for no leukocytosis, BMP with Na 133   Imaging: CXR: No acute infiltrates   XR pelvis: s/p L hip replacement, no acute fracture or dislocation   CT Abd: mild bowel wall thickening, in ascending and proximal transverse colon, may reflect chronic colitis. mild to moderate distended stomach.   EKG: Sinus tachycardia , TWI in inferior and lateral leads,   Medications: none   Consults: none  70M with PMH alcohol withdrawal seizures, now sober for >1 year, OA s/p L hip arthroplasty, hyponatremia metabolic encephalopathy, UTI w/ history of urinary retention, who presents to the ED after being BIBA for failure to thrive and deconditioning in setting of inability to walk. Of note, patient's significant other is also admitted to the hospital, and due to her concerns for her inability to take care of him, social work/ called 911 3 days ago. He had a wellness check but was not brought to the ED at that time. Of note, he has been mostly "bed bound" due to inability to bear weight on R leg, when prompted about reasons, he is evasive and states he does not remember and asks for us to look at his previous chart. Patient is not a reliable historian, however he is alert and oriented x3. He states he has been feeling fine overall with no acute complaints but he has not been able to get up out of bed, his super has been bringing him water/food. Today the super called EMS, because patient was found down on the floor at least since yesterday (which was when he last checked on him). Patient has trouble remembering the timeline of his symptoms, and when the last time he was seen by a doctor. Upon review of systems, he admits to difficulty with swallowing solid foods, but no problems with fluids. He states he usually vomits when attempting to eat solid food. He is unable to say duration of symptoms. He does report unspecified amount of weight loss, denies any fevers, chills, chest pain, chest tightness, abdominal pain, diarrhea, constipation, melena, hematochezia, dysuria, numbness,  joint pain, joint swelling, lightheadedness, dizziness, or any other complaints at this time. Of note patient was admitted back from 09/15/19-09/17/19 with similar presentation, at that time pt had come in from [facility] with LLE weakness and pain. At that time patient underwent CT lumbar as well, which revealed moderate canal stenosis and moderate neural foraminal narrowing. He was discharged to [facility] at that time.     In the ED: Initial vital signs: T: , HR: 122 , BP: 97/68 mmHg, R: 18, SpO2: 98% on RA  ED course: s/p 3L NS, PO 40mEq KCl x1  Labs: significant for no leukocytosis, BMP with Na 133, lactate 4.7 -> 2.1. UA with nitrites and bacteria but no WBCs or leukocyte esterase   Imaging: CXR: No acute infiltrates   XR pelvis: s/p L hip replacement, no acute fracture or dislocation   CT Abd: mild bowel wall thickening, in ascending and proximal transverse colon, may reflect chronic colitis. mild to moderate distended stomach.   EKG: Sinus tachycardia , TWI in inferior and lateral leads,   Medications: none   Consults: none  70M with PMH alcohol withdrawal seizures, now sober for >1 year, OA s/p L hip arthroplasty, hyponatremia metabolic encephalopathy, UTI w/ history of urinary retention, who presents to the ED after being BIBA for failure to thrive and deconditioning in setting of inability to walk. Of note, patient's significant other is also admitted to the hospital, and due to her concerns for her inability to take care of him, social work/ called 911 3 days ago. He had a wellness check but was not brought to the ED at that time. Of note, he has been mostly "bed bound" due to inability to bear weight on R leg, when prompted about reasons, he is evasive and states he does not remember and asks for us to look at his previous chart. Patient is not a reliable historian, however he is alert and oriented x3. He states he has been feeling fine overall with no acute complaints but he has not been able to get up out of bed, his super has been bringing him water/food. Today the super called EMS, because patient was found down on the floor at least since yesterday (which was when he last checked on him). Patient has trouble remembering the timeline of his symptoms, and when the last time he was seen by a doctor. Upon review of systems, he admits to difficulty with swallowing solid foods, but no problems with fluids. He states he usually vomits when attempting to eat solid food. He is unable to say duration of symptoms. He does report unspecified amount of weight loss, denies any fevers, chills, chest pain, chest tightness, abdominal pain, diarrhea, constipation, melena, hematochezia, dysuria, numbness,  joint pain, joint swelling, lightheadedness, dizziness, or any other complaints at this time. Of note patient was admitted back from 09/15/19-09/17/19 with similar presentation, at that time pt had come in from [facility] with LLE weakness and pain. At that time patient underwent CT lumbar as well, which revealed moderate canal stenosis and moderate neural foraminal narrowing. He was discharged to [facility] at that time.     In the ED: Initial vital signs: T: 98.1, HR: 122 --> 98 , BP: 97/68 mmHg, R: 18, SpO2: 98% on RA  ED course: s/p 3L NS, PO 40mEq KCl x1  Labs: significant for no leukocytosis, BMP with Na 133, lactate 4.7 -> 2.1. UA with nitrites and bacteria but no WBCs or leukocyte esterase   Imaging: CXR: No acute infiltrates   XR pelvis: s/p L hip replacement, no acute fracture or dislocation   CT Abd: mild bowel wall thickening, in ascending and proximal transverse colon, may reflect chronic colitis. mild to moderate distended stomach.   EKG: Sinus tachycardia , TWI in inferior and lateral leads,   Medications: none   Consults: none

## 2020-12-20 NOTE — H&P ADULT - NSHPSOCIALHISTORY_GEN_ALL_CORE
Lives home with significant other, mostly in bed.  Inability to perform ADLs  Former alcohol abuse, denies any smoking, or illicit drug use

## 2020-12-20 NOTE — ED PROVIDER NOTE - CHPI ED SYMPTOMS NEG
no abrasion/no bleeding/no deformity/no fever/no loss of consciousness/no numbness/no tingling/no vomiting

## 2020-12-20 NOTE — H&P ADULT - ASSESSMENT
***INCOMPLETE**  70M with PMH alcohol withdrawal seizures, now sober for >1 year, OA s/p L hip arthroplasty, hyponatremia metabolic encephalopathy, UTI w/ history of urinary retention, presenting with weakness, failure to thrive, and inability to walk/perform ADLs.

## 2020-12-20 NOTE — H&P ADULT - PROBLEM SELECTOR PLAN 3
Pt with reported history of dysphagia to solid foods, and CT abd with distended stomach. No history of CVA, GERD, or esophageal motility issues   - dysphagia screening and speech and swallow consult

## 2020-12-20 NOTE — ED ADULT TRIAGE NOTE - CHIEF COMPLAINT QUOTE
patient BIBA from home. he states that his wife is in the hospital and he has not been able to take care of himself. complains of generalized weakness. patient states that his super has been coming in to check on him. denies chest pain, fever, chills, n/v/d.

## 2020-12-20 NOTE — H&P ADULT - PROBLEM SELECTOR PLAN 5
Lactate elevated to 4.7 on arrival, likely 2/2 dehydration, no hypotension, no signs of hypoperfusion or sepsis. After 3L NS, downtrended to 2.1.   - no longer trending  - encourage po intake

## 2020-12-20 NOTE — H&P ADULT - PROBLEM SELECTOR PLAN 6
Pt hyponatremic to 133, likely in the setting of decreased solute intake/hypovolemic.   - f/u serum sodium  - low utility in performing urine lytes given 3L fluid resuscitation  - trend BMP

## 2020-12-20 NOTE — H&P ADULT - ATTENDING COMMENTS
- LE weakness similar to prior presentation, CT lumbar spine with moderate bilateral neural foraminal narrowing from L1-S1. No saddle anesthesia. Unlikely acute change. Consider neuro c/s in AM  - Likely chronic deconditioning, PT c/s in AM. Will likely need CHITO  - Etiology of reported dysphagia unclear, S+S eval in AM  - Mild lactic acidosis, improved after IVF hydration.  - Likely hypovolemic hyponatremia, continue to trend. If worsening will send off urine sodium, serum/urine osm  - EKG w/TWI in precordium and inferior leads. Patient asymptomatic, CE neg x1, no need to further trend. Repeat EKG in AM  - Rest of plan as noted above

## 2020-12-20 NOTE — H&P ADULT - PROBLEM SELECTOR PLAN 4
EKG on presentation with TWI in inferior and lateral leads, patient is asymptomatic of any chest pain, shortness of breath, palpitations, etc. Trop negative x1  - f/u repeat EKG

## 2020-12-21 LAB
24R-OH-CALCIDIOL SERPL-MCNC: 30.3 NG/ML — SIGNIFICANT CHANGE UP (ref 30–80)
ALBUMIN SERPL ELPH-MCNC: 2.4 G/DL — LOW (ref 3.3–5)
ALP SERPL-CCNC: 55 U/L — SIGNIFICANT CHANGE UP (ref 40–120)
ALT FLD-CCNC: 13 U/L — SIGNIFICANT CHANGE UP (ref 10–45)
ANION GAP SERPL CALC-SCNC: 10 MMOL/L — SIGNIFICANT CHANGE UP (ref 5–17)
ANION GAP SERPL CALC-SCNC: 8 MMOL/L — SIGNIFICANT CHANGE UP (ref 5–17)
APPEARANCE UR: CLEAR — SIGNIFICANT CHANGE UP
AST SERPL-CCNC: 21 U/L — SIGNIFICANT CHANGE UP (ref 10–40)
BASOPHILS # BLD AUTO: 0.03 K/UL — SIGNIFICANT CHANGE UP (ref 0–0.2)
BASOPHILS NFR BLD AUTO: 0.5 % — SIGNIFICANT CHANGE UP (ref 0–2)
BILIRUB SERPL-MCNC: 0.5 MG/DL — SIGNIFICANT CHANGE UP (ref 0.2–1.2)
BILIRUB UR-MCNC: NEGATIVE — SIGNIFICANT CHANGE UP
BUN SERPL-MCNC: 7 MG/DL — SIGNIFICANT CHANGE UP (ref 7–23)
BUN SERPL-MCNC: 7 MG/DL — SIGNIFICANT CHANGE UP (ref 7–23)
CALCIUM SERPL-MCNC: 7.8 MG/DL — LOW (ref 8.4–10.5)
CALCIUM SERPL-MCNC: 8 MG/DL — LOW (ref 8.4–10.5)
CHLORIDE SERPL-SCNC: 102 MMOL/L — SIGNIFICANT CHANGE UP (ref 96–108)
CHLORIDE SERPL-SCNC: 104 MMOL/L — SIGNIFICANT CHANGE UP (ref 96–108)
CO2 SERPL-SCNC: 27 MMOL/L — SIGNIFICANT CHANGE UP (ref 22–31)
CO2 SERPL-SCNC: 27 MMOL/L — SIGNIFICANT CHANGE UP (ref 22–31)
COLOR SPEC: YELLOW — SIGNIFICANT CHANGE UP
CREAT SERPL-MCNC: 0.51 MG/DL — SIGNIFICANT CHANGE UP (ref 0.5–1.3)
CREAT SERPL-MCNC: 0.58 MG/DL — SIGNIFICANT CHANGE UP (ref 0.5–1.3)
CULTURE RESULTS: SIGNIFICANT CHANGE UP
DIFF PNL FLD: NEGATIVE — SIGNIFICANT CHANGE UP
EOSINOPHIL # BLD AUTO: 0.06 K/UL — SIGNIFICANT CHANGE UP (ref 0–0.5)
EOSINOPHIL NFR BLD AUTO: 1 % — SIGNIFICANT CHANGE UP (ref 0–6)
FOLATE SERPL-MCNC: 11.7 NG/ML — SIGNIFICANT CHANGE UP
GLUCOSE SERPL-MCNC: 89 MG/DL — SIGNIFICANT CHANGE UP (ref 70–99)
GLUCOSE SERPL-MCNC: 92 MG/DL — SIGNIFICANT CHANGE UP (ref 70–99)
GLUCOSE UR QL: NEGATIVE — SIGNIFICANT CHANGE UP
HCT VFR BLD CALC: 34.9 % — LOW (ref 39–50)
HGB BLD-MCNC: 11.4 G/DL — LOW (ref 13–17)
IMM GRANULOCYTES NFR BLD AUTO: 1.2 % — SIGNIFICANT CHANGE UP (ref 0–1.5)
KETONES UR-MCNC: NEGATIVE — SIGNIFICANT CHANGE UP
LEUKOCYTE ESTERASE UR-ACNC: NEGATIVE — SIGNIFICANT CHANGE UP
LYMPHOCYTES # BLD AUTO: 1.13 K/UL — SIGNIFICANT CHANGE UP (ref 1–3.3)
LYMPHOCYTES # BLD AUTO: 19.6 % — SIGNIFICANT CHANGE UP (ref 13–44)
MAGNESIUM SERPL-MCNC: 1.7 MG/DL — SIGNIFICANT CHANGE UP (ref 1.6–2.6)
MCHC RBC-ENTMCNC: 31.7 PG — SIGNIFICANT CHANGE UP (ref 27–34)
MCHC RBC-ENTMCNC: 32.7 GM/DL — SIGNIFICANT CHANGE UP (ref 32–36)
MCV RBC AUTO: 96.9 FL — SIGNIFICANT CHANGE UP (ref 80–100)
MONOCYTES # BLD AUTO: 0.54 K/UL — SIGNIFICANT CHANGE UP (ref 0–0.9)
MONOCYTES NFR BLD AUTO: 9.4 % — SIGNIFICANT CHANGE UP (ref 2–14)
NEUTROPHILS # BLD AUTO: 3.94 K/UL — SIGNIFICANT CHANGE UP (ref 1.8–7.4)
NEUTROPHILS NFR BLD AUTO: 68.3 % — SIGNIFICANT CHANGE UP (ref 43–77)
NITRITE UR-MCNC: NEGATIVE — SIGNIFICANT CHANGE UP
NRBC # BLD: 0 /100 WBCS — SIGNIFICANT CHANGE UP (ref 0–0)
PH UR: 6.5 — SIGNIFICANT CHANGE UP (ref 5–8)
PHOSPHATE SERPL-MCNC: 3 MG/DL — SIGNIFICANT CHANGE UP (ref 2.5–4.5)
PLATELET # BLD AUTO: 264 K/UL — SIGNIFICANT CHANGE UP (ref 150–400)
POTASSIUM SERPL-MCNC: 2.9 MMOL/L — CRITICAL LOW (ref 3.5–5.3)
POTASSIUM SERPL-MCNC: 3.9 MMOL/L — SIGNIFICANT CHANGE UP (ref 3.5–5.3)
POTASSIUM SERPL-SCNC: 2.9 MMOL/L — CRITICAL LOW (ref 3.5–5.3)
POTASSIUM SERPL-SCNC: 3.9 MMOL/L — SIGNIFICANT CHANGE UP (ref 3.5–5.3)
PROT SERPL-MCNC: 5.1 G/DL — LOW (ref 6–8.3)
PROT UR-MCNC: NEGATIVE MG/DL — SIGNIFICANT CHANGE UP
RBC # BLD: 3.6 M/UL — LOW (ref 4.2–5.8)
RBC # FLD: 13.4 % — SIGNIFICANT CHANGE UP (ref 10.3–14.5)
SODIUM SERPL-SCNC: 139 MMOL/L — SIGNIFICANT CHANGE UP (ref 135–145)
SODIUM SERPL-SCNC: 139 MMOL/L — SIGNIFICANT CHANGE UP (ref 135–145)
SP GR SPEC: 1.01 — SIGNIFICANT CHANGE UP (ref 1–1.03)
SPECIMEN SOURCE: SIGNIFICANT CHANGE UP
TSH SERPL-MCNC: 1.59 UIU/ML — SIGNIFICANT CHANGE UP (ref 0.35–4.94)
UROBILINOGEN FLD QL: 0.2 E.U./DL — SIGNIFICANT CHANGE UP
VIT B12 SERPL-MCNC: 1269 PG/ML — HIGH (ref 232–1245)
WBC # BLD: 5.77 K/UL — SIGNIFICANT CHANGE UP (ref 3.8–10.5)
WBC # FLD AUTO: 5.77 K/UL — SIGNIFICANT CHANGE UP (ref 3.8–10.5)

## 2020-12-21 PROCEDURE — 93010 ELECTROCARDIOGRAM REPORT: CPT

## 2020-12-21 PROCEDURE — 72131 CT LUMBAR SPINE W/O DYE: CPT | Mod: 26

## 2020-12-21 PROCEDURE — 99233 SBSQ HOSP IP/OBS HIGH 50: CPT | Mod: GC

## 2020-12-21 RX ORDER — POTASSIUM CHLORIDE 20 MEQ
40 PACKET (EA) ORAL ONCE
Refills: 0 | Status: COMPLETED | OUTPATIENT
Start: 2020-12-21 | End: 2020-12-21

## 2020-12-21 RX ORDER — MAGNESIUM SULFATE 500 MG/ML
2 VIAL (ML) INJECTION ONCE
Refills: 0 | Status: COMPLETED | OUTPATIENT
Start: 2020-12-21 | End: 2020-12-21

## 2020-12-21 RX ORDER — POTASSIUM CHLORIDE 20 MEQ
10 PACKET (EA) ORAL
Refills: 0 | Status: COMPLETED | OUTPATIENT
Start: 2020-12-21 | End: 2020-12-21

## 2020-12-21 RX ADMIN — Medication 100 MILLIEQUIVALENT(S): at 11:29

## 2020-12-21 RX ADMIN — Medication 100 MILLIEQUIVALENT(S): at 10:15

## 2020-12-21 RX ADMIN — ENOXAPARIN SODIUM 40 MILLIGRAM(S): 100 INJECTION SUBCUTANEOUS at 21:44

## 2020-12-21 RX ADMIN — SENNA PLUS 1 TABLET(S): 8.6 TABLET ORAL at 21:44

## 2020-12-21 RX ADMIN — Medication 100 MILLIEQUIVALENT(S): at 08:43

## 2020-12-21 RX ADMIN — Medication 40 MILLIEQUIVALENT(S): at 07:20

## 2020-12-21 RX ADMIN — Medication 50 GRAM(S): at 07:23

## 2020-12-21 NOTE — CONSULT NOTE ADULT - SUBJECTIVE AND OBJECTIVE BOX
Patient is a 70y old  Male who presents with a chief complaint of Failure to thrive, inability to perform ADLs (20 Dec 2020 14:59)       HPI:  70M with PMH alcohol withdrawal seizures, now sober for >1 year, OA s/p L hip arthroplasty, hyponatremia metabolic encephalopathy, UTI w/ history of urinary retention, who presents to the ED after being BIBA for failure to thrive and deconditioning in setting of inability to walk. Of note, patient's significant other is also admitted to the hospital, and due to her concerns for her inability to take care of him, social work/ called 911 3 days ago. He had a wellness check but was not brought to the ED at that time. Of note, he has been mostly "bed bound" due to inability to bear weight on R leg, when prompted about reasons, he is evasive and states he does not remember and asks for us to look at his previous chart. Patient is not a reliable historian, however he is alert and oriented x3. He states he has been feeling fine overall with no acute complaints but he has not been able to get up out of bed, his super has been bringing him water/food. Today the super called EMS, because patient was found down on the floor at least since yesterday (which was when he last checked on him). Patient has trouble remembering the timeline of his symptoms, and when the last time he was seen by a doctor. Upon review of systems, he admits to difficulty with swallowing solid foods, but no problems with fluids. He states he usually vomits when attempting to eat solid food. He is unable to say duration of symptoms. He does report unspecified amount of weight loss, denies any fevers, chills, chest pain, chest tightness, abdominal pain, diarrhea, constipation, melena, hematochezia, dysuria, numbness,  joint pain, joint swelling, lightheadedness, dizziness, or any other complaints at this time. Of note patient was admitted back from 09/15/19-19 with similar presentation, at that time pt had come in from [facility] with LLE weakness and pain. At that time patient underwent CT lumbar as well, which revealed moderate canal stenosis and moderate neural foraminal narrowing. He was discharged to [facility] at that time.     In the ED: Initial vital signs: T: 98.1, HR: 122 --> 98 , BP: 97/68 mmHg, R: 18, SpO2: 98% on RA  ED course: s/p 3L NS, PO 40mEq KCl x1  Labs: significant for no leukocytosis, BMP with Na 133, lactate 4.7 -> 2.1. UA with nitrites and bacteria but no WBCs or leukocyte esterase   Imaging: CXR: No acute infiltrates   XR pelvis: s/p L hip replacement, no acute fracture or dislocation   CT Abd: mild bowel wall thickening, in ascending and proximal transverse colon, may reflect chronic colitis. mild to moderate distended stomach.   EKG: Sinus tachycardia , TWI in inferior and lateral leads,   Medications: none   Consults: none  (20 Dec 2020 14:59)      PAST MEDICAL & SURGICAL HISTORY:  UTI (urinary tract infection)    Alcohol abuse    Seizure    History of hip replacement        MEDICATIONS  (STANDING):  enoxaparin Injectable 40 milliGRAM(s) SubCutaneous every 24 hours  potassium chloride  10 mEq/100 mL IVPB 10 milliEquivalent(s) IV Intermittent every 1 hour  senna 1 Tablet(s) Oral at bedtime    MEDICATIONS  (PRN):  polyethylene glycol 3350 17 Gram(s) Oral daily PRN Constipation      FAMILY HISTORY:      CBC Full  -  ( 21 Dec 2020 06:07 )  WBC Count : 5.77 K/uL  RBC Count : 3.60 M/uL  Hemoglobin : 11.4 g/dL  Hematocrit : 34.9 %  Platelet Count - Automated : 264 K/uL  Mean Cell Volume : 96.9 fl  Mean Cell Hemoglobin : 31.7 pg  Mean Cell Hemoglobin Concentration : 32.7 gm/dL  Auto Neutrophil # : 3.94 K/uL  Auto Lymphocyte # : 1.13 K/uL  Auto Monocyte # : 0.54 K/uL  Auto Eosinophil # : 0.06 K/uL  Auto Basophil # : 0.03 K/uL  Auto Neutrophil % : 68.3 %  Auto Lymphocyte % : 19.6 %  Auto Monocyte % : 9.4 %  Auto Eosinophil % : 1.0 %  Auto Basophil % : 0.5 %          139  |  102  |  7   ----------------------------<  89  2.9<LL>   |  27  |  0.58    Ca    8.0<L>      21 Dec 2020 06:07  Mg     1.7         TPro  5.1<L>  /  Alb  2.4<L>  /  TBili  0.5  /  DBili  x   /  AST  21  /  ALT  13  /  AlkPhos  55  12-21      Urinalysis Basic - ( 21 Dec 2020 05:22 )    Color: Yellow / Appearance: Clear / S.010 / pH: x  Gluc: x / Ketone: NEGATIVE  / Bili: Negative / Urobili: 0.2 E.U./dL   Blood: x / Protein: NEGATIVE mg/dL / Nitrite: NEGATIVE   Leuk Esterase: NEGATIVE / RBC: x / WBC x   Sq Epi: x / Non Sq Epi: x / Bacteria: x          Radiology:    < from: CT Head No Cont (20 @ 11:13) >  EXAM:  CT BRAIN                          PROCEDURE DATE:  2020          INTERPRETATION:  Amelia MEADOWS MD, have reviewed the images and the report and agree with the findings.     INDICATIONS: Mental status changes, unknown cause. Found down at home, mildly confused.    TECHNIQUE: Serial axial images were obtained from the skull base to the vertex without the use of intravenous contrast. Multiplanar reformats were obtained.    COMPARISON EXAMINATION: CT head 9/15/2019.    FINDINGS:  VENTRICLES AND SULCI: Prominent cortical sulci and ventricles consistent with parenchymal volume loss, unchanged from prior study.  INTRA-AXIAL: No intracranial mass, acute hemorrhage, midline shift or acute transcortical infarct is seen. There are scattered periventricular and subcortical hypodensities consistent with small vessel ischemic disease.  EXTRA-AXIAL: No extra-axial fluid collection is present.  VISUALIZED SINUSES: No air-fluid levels are identified.  VISUALIZED MASTOIDS: Clear.  CALVARIUM: No fractures.  MISCELLANEOUS: Status post right lens replacement.    IMPRESSION: No intracranial hemorrhage, fractures or transcortical infarct. Small vessel ischemic disease. Parenchymal volume loss, unchanged.        < from: CT Abdomen and Pelvis w/ IV Cont (20 @ 12:47) >  EXAM:  CT ABDOMEN AND PELVIS IC                          PROCEDURE DATE:  2020          INTERPRETATION:  CLINICAL INFORMATION: Nausea and vomiting    COMPARISON: CT 3/16/2018.    PROCEDURE:  CT of the Abdomen and Pelvis was performed with intravenous contrast.  Intravenous contrast: 90 ml Omnipaque 350. 10 ml discarded.  Oral contrast: None.  Sagittal and coronal reformats were performed.    FINDINGS:    Lower chest: Within normal limits.    Liver: Normal in size and morphology. No focal abnormality. The main portal vein is patent.  Gallbladder and biliary ducts: No gallstones. No intra/extrahepatic biliary ductal dilatation.  Spleen: Within normal limits.  Pancreas: Within normal limits.  Adrenals: Within normal limits.  Kidneys/ureters: No hydronephrosis. No urinary calculi. No focal parenchymal abnormality.    Bladder: Within normal limits.  Reproductive organs: Prostate within normal limits.    Bowel: The bowel is normal in caliber. Mildly distended stomach with air-fluid level. Fluid-filled Duodenal diverticulum arising from the second portion of the duodenum. Mild bowel wall thickening in the small transverse colon and ascending colon including cecum, demonstrating areas of submucosal fat density and pronounced mucosal enhancement. No definite abnormality in the terminal ileum. Normal appendix. Moderate stool burden in the rectosigmoid colon.  Peritoneum/retroperitoneum: No ascites.  Vasculature:  The aorta and its branches are normal in caliber. Moderate aortoiliac calcified plaques. Patent aortic branches.  Lymph nodes: Lymph nodes are not enlarged.  Bones and soft tissue: Degenerative changes in spine.      IMPRESSION:  1.  Mild bowel wall thickening in the ascending colon and proximal transverse colon, may reflect sequelae of chronic colitis. Mild acute on chronic colitis cannot be excluded, clinical correlation is recommended.  2.  Mild to moderately distended stomach without obstructive lesion, clinical correlation is recommended.                  Vital Signs Last 24 Hrs  T(C): 36.4 (21 Dec 2020 05:24), Max: 36.6 (20 Dec 2020 10:41)  T(F): 97.6 (21 Dec 2020 05:24), Max: 97.9 (20 Dec 2020 13:10)  HR: 88 (21 Dec 2020 05:24) (85 - 98)  BP: 100/63 (21 Dec 2020 05:24) (89/59 - 161/81)  BP(mean): --  RR: 19 (21 Dec 2020 05:24) (18 - 19)  SpO2: 98% (21 Dec 2020 05:24) (97% - 100%)    REVIEW OF SYSTEMS: as per HPI        Physical Exam:  frail dishevelled 69 yo  gentleman lying in bed, no acute complaints    Head: normocephalic, atraumatic    Eyes: PERRLA, EOMI, no nystagmus, sclera anicteric    ENT: nasal discharge, uvula midline, no oropharyngeal erythema/exudate    Neck: supple, negative JVD, negative carotid bruits, no thyromegaly    Chest: CTA bilaterally, neg wheeze/ rhonchi/ rales/ crackles/ egophany    Cardiovascular: regular rate and rhythm, neg murmurs/rubs/gallops    Abdomen: soft, non distended, non tender to palpation in all 4 quadrants, negative rebound/guarding, normal bowel sounds    Extremities: WWP, neg cyanosis/clubbing/edema, negative calf tenderness to palpation, negative Luis Alfredo's sign    Musculoskeletal:    Skin:      :     Neurologic Exam:    Alert and oriented to person, place, date/year, speech fluent w/o dysarthria, follows commands, recent and remote memory intact, repetition intact, comprehension intact,  attention/concentration intact, fund of knowledge appropriate    Cranial Nerves:     II:                       pupils equal, round and reactive to light, visual fields intact   III/ IV/VI:             extraocular movements intact, neg nystagmus, neg ptosis  V:                       facial sensation intact, V1-3 normal  VII:                     face symmetric, no droop, normal eye closure and smile  VIII:                    hearing intact to finger rub bilaterally  IX/ X:                 soft palate rise symmetrical  XI:                      head turning, shoulder shrug normal  XII:                     tongue midline    Motor Exam:    Right UE:   no focal weakness > 3+/5                     pronator drift: neg    Left UE:     no focal weakness > 3+/5                    pronator drift: neg        Right LE:  no focal weakness > 3+/5    Left LE:      no focal weakness > 3+/5               Sensation: Intact to light touch x 4 extremities                                         DTR:                        biceps/brachioradialis: equal bilaterally              patella/ankle: equal bilaterally              neg clonus          neg Babinski                        Gait:  not tested        PM&R Impression:    1) FAilure to thrive  2)deconditioned  3) no focal weakness  4) Lumbar spinal stenosis    Plan:    1) Physical therapy focusing on therapeutic exercises, bed mobility/transfer out of bed evaluation, progressive ambulation with assistive devices prn.    2) Anticipated Disposition Plan/Recs:    pending functional progress, probable subacute rehab placement

## 2020-12-21 NOTE — SWALLOW BEDSIDE ASSESSMENT ADULT - SLP PERTINENT HISTORY OF CURRENT PROBLEM
70M with h/o alcohol withrawal seizures, hyponatremia, OA s/p hip arthroplasty, UTI, BIBA after being found down on the floor, admitted with FTT and inability to walk.  Brain imaging unremarkable.  Per chart, pt complains of solid dysphagia with vomiting when eating solids

## 2020-12-21 NOTE — PROGRESS NOTE ADULT - PROBLEM SELECTOR PLAN 2
see plan as above see plan as above    #Urine retention  RESOLVED as for repeat bladder scan with <300cc.   -f/u bladder scans

## 2020-12-21 NOTE — PROGRESS NOTE ADULT - SUBJECTIVE AND OBJECTIVE BOX
**INCOMPLETE NOTE    OVERNIGHT EVENTS:    SUBJECTIVE:  Patient seen and examined at bedside.    Vital Signs Last 12 Hrs  T(F): 97.6 (20 @ 05:24), Max: 97.6 (20 @ 05:24)  HR: 88 (20 @ 05:24) (85 - 88)  BP: 100/63 (20 @ 05:24) (90/57 - 100/63)  BP(mean): --  RR: 19 (20 @ 05:24) (18 - 19)  SpO2: 98% (20 @ 05:24) (97% - 98%)  I&O's Summary    20 Dec 2020 07:01  -  21 Dec 2020 07:00  --------------------------------------------------------  IN: 0 mL / OUT: 950 mL / NET: -950 mL        PHYSICAL EXAM:  Constitutional: NAD, comfortable in bed.  HEENT: NC/AT, PERRLA, EOMI, no conjunctival pallor or scleral icterus, MMM  Neck: Supple, no JVD  Respiratory: CTA B/L. No w/r/r.   Cardiovascular: RRR, normal S1 and S2, no m/r/g.   Gastrointestinal: +BS, soft NTND, no guarding or rebound tenderness, no palpable masses   Extremities: wwp; no cyanosis, clubbing or edema.   Vascular: Pulses equal and strong throughout.   Neurological: AAOx3, no CN deficits, strength and sensation intact throughout.   Skin: No gross skin abnormalities or rashes        LABS:                        11.4   5.77  )-----------( 264      ( 21 Dec 2020 06:07 )             34.9     12-    139  |  102  |  7   ----------------------------<  89  2.9<LL>   |  27  |  0.58    Ca    8.0<L>      21 Dec 2020 06:07  Mg     1.7     12-    TPro  5.1<L>  /  Alb  2.4<L>  /  TBili  0.5  /  DBili  x   /  AST  21  /  ALT  13  /  AlkPhos  55  12-21    PT/INR - ( 20 Dec 2020 09:00 )   PT: 14.1 sec;   INR: 1.18          PTT - ( 20 Dec 2020 09:00 )  PTT:28.8 sec  Urinalysis Basic - ( 21 Dec 2020 05:22 )    Color: Yellow / Appearance: Clear / S.010 / pH: x  Gluc: x / Ketone: NEGATIVE  / Bili: Negative / Urobili: 0.2 E.U./dL   Blood: x / Protein: NEGATIVE mg/dL / Nitrite: NEGATIVE   Leuk Esterase: NEGATIVE / RBC: x / WBC x   Sq Epi: x / Non Sq Epi: x / Bacteria: x          RADIOLOGY & ADDITIONAL TESTS:    MEDICATIONS  (STANDING):  enoxaparin Injectable 40 milliGRAM(s) SubCutaneous every 24 hours  potassium chloride  10 mEq/100 mL IVPB 10 milliEquivalent(s) IV Intermittent every 1 hour  senna 1 Tablet(s) Oral at bedtime    MEDICATIONS  (PRN):  polyethylene glycol 3350 17 Gram(s) Oral daily PRN Constipation   Incomplete note    OVERNIGHT EVENTS: RAJESH    SUBJECTIVE: Patient seen and examined at bedside.     Vital Signs Last 12 Hrs  T(F): 97.6 (20 @ 05:24), Max: 97.6 (20 @ 05:24)  HR: 88 (20 @ 05:24) (85 - 88)  BP: 100/63 (20 @ 05:24) (90/57 - 100/63)  BP(mean): --  RR: 19 (20 @ 05:24) (18 - 19)  SpO2: 98% (20 @ 05:24) (97% - 98%)  I&O's Summary    20 Dec 2020 07:01  -  21 Dec 2020 07:00  --------------------------------------------------------  IN: 0 mL / OUT: 950 mL / NET: -950 mL        PHYSICAL EXAM:  Constitutional: NAD, comfortable in bed.  HEENT: NC/AT, PERRLA, EOMI, no conjunctival pallor or scleral icterus, MMM  Neck: Supple, no JVD  Respiratory: CTA B/L. No w/r/r.   Cardiovascular: RRR, normal S1 and S2, no m/r/g.   Gastrointestinal: +BS, soft NTND, no guarding or rebound tenderness, no palpable masses   Extremities: wwp; no cyanosis, clubbing or edema.   Vascular: Pulses equal and strong throughout.   Neurological: AAOx3, no CN deficits, strength and sensation intact throughout.   Skin: No gross skin abnormalities or rashes        LABS:                        11.4   5.77  )-----------( 264      ( 21 Dec 2020 06:07 )             34.9     12-    139  |  102  |  7   ----------------------------<  89  2.9<LL>   |  27  |  0.58    Ca    8.0<L>      21 Dec 2020 06:07  Mg     1.7     12-    TPro  5.1<L>  /  Alb  2.4<L>  /  TBili  0.5  /  DBili  x   /  AST  21  /  ALT  13  /  AlkPhos  55  12-21    PT/INR - ( 20 Dec 2020 09:00 )   PT: 14.1 sec;   INR: 1.18          PTT - ( 20 Dec 2020 09:00 )  PTT:28.8 sec  Urinalysis Basic - ( 21 Dec 2020 05:22 )    Color: Yellow / Appearance: Clear / S.010 / pH: x  Gluc: x / Ketone: NEGATIVE  / Bili: Negative / Urobili: 0.2 E.U./dL   Blood: x / Protein: NEGATIVE mg/dL / Nitrite: NEGATIVE   Leuk Esterase: NEGATIVE / RBC: x / WBC x   Sq Epi: x / Non Sq Epi: x / Bacteria: x          RADIOLOGY & ADDITIONAL TESTS:    MEDICATIONS  (STANDING):  enoxaparin Injectable 40 milliGRAM(s) SubCutaneous every 24 hours  potassium chloride  10 mEq/100 mL IVPB 10 milliEquivalent(s) IV Intermittent every 1 hour  senna 1 Tablet(s) Oral at bedtime    MEDICATIONS  (PRN):  polyethylene glycol 3350 17 Gram(s) Oral daily PRN Constipation   OVERNIGHT EVENTS: Bladder scan with 375 got straight cath, repeat bladder scan >300 but patient refuses [place] by urology/ or repeat straight cath     SUBJECTIVE: Patient seen and examined at bedside. Patient is comfortable, and has no complaints. Patient is very poor historian and changes his story, saying he fall at home walking with walker to the bathroom, and hit his head. He claims his girlfriend was with him and could not help him up, so called to super to assist them. of note, berta is hospitalized with us for a few days now. Patient denies h/n/v/d, fever, chills, cp, palpitations, sob, abd pain, leg swelling, rashes, dysuria, and changes in BM.      Vital Signs Last 12 Hrs  T(F): 97.6 (20 @ 05:24), Max: 97.6 (20 @ 05:24)  HR: 88 (20 @ 05:24) (85 - 88)  BP: 100/63 (20 @ 05:24) (90/57 - 100/63)  BP(mean): --  RR: 19 (20 @ 05:24) (18 - 19)  SpO2: 98% (20 @ 05:24) (97% - 98%)  I&O's Summary    20 Dec 2020 07:01  -  21 Dec 2020 07:00  --------------------------------------------------------  IN: 0 mL / OUT: 950 mL / NET: -950 mL    PHYSICAL EXAM:  Constitutional: NAD, comfortable in bed.  HEENT: NC/AT, Baseline anisocoria, PERRLA, EOMI, no conjunctival pallor or scleral icterus, MMM  Neck: Supple, no JVD  Respiratory: CTA B/L. No w/r/r.   Cardiovascular: RRR, normal S1 and S2, no m/r/g.   Gastrointestinal: +BS, soft NTND, no guarding or rebound tenderness, no palpable masses   Extremities: wwp; no cyanosis, clubbing or edema.   Vascular: Pulses equal and strong throughout.   Neurological: AAOx3, no CN deficits, strength and sensation intact throughout. 5/5 strength in bilateral proximal and distal upper extremities  4+/5 strength to LLE, 3/5 strength to RLE upon hip flexion and knee flexion  Skin: No gross skin abnormalities or rashes    LABS:                        11.4   5.77  )-----------( 264      ( 21 Dec 2020 06:07 )             34.9     12-    139  |  102  |  7   ----------------------------<  89  2.9<LL>   |  27  |  0.58    Ca    8.0<L>      21 Dec 2020 06:07  Mg     1.7         TPro  5.1<L>  /  Alb  2.4<L>  /  TBili  0.5  /  DBili  x   /  AST  21  /  ALT  13  /  AlkPhos  55  12-    PT/INR - ( 20 Dec 2020 09:00 )   PT: 14.1 sec;   INR: 1.18          PTT - ( 20 Dec 2020 09:00 )  PTT:28.8 sec  Urinalysis Basic - ( 21 Dec 2020 05:22 )    Color: Yellow / Appearance: Clear / S.010 / pH: x  Gluc: x / Ketone: NEGATIVE  / Bili: Negative / Urobili: 0.2 E.U./dL   Blood: x / Protein: NEGATIVE mg/dL / Nitrite: NEGATIVE   Leuk Esterase: NEGATIVE / RBC: x / WBC x   Sq Epi: x / Non Sq Epi: x / Bacteria: x    MEDICATIONS  (STANDING):  enoxaparin Injectable 40 milliGRAM(s) SubCutaneous every 24 hours  potassium chloride  10 mEq/100 mL IVPB 10 milliEquivalent(s) IV Intermittent every 1 hour  senna 1 Tablet(s) Oral at bedtime    MEDICATIONS  (PRN):  polyethylene glycol 3350 17 Gram(s) Oral daily PRN Constipation

## 2020-12-21 NOTE — OCCUPATIONAL THERAPY INITIAL EVALUATION ADULT - ADDITIONAL COMMENTS
Pt was not a reliable historian as he reports he was independent with ADLs & functional mobility, prior to this admission. Pt also indicates that he lives with his girlfriend.     However, as per care coordination notes, CM confirmed with caregiver who is his spouse who is also currently admitted at [facility] that patient has not been walking for months and was bedridden using a urinal. Caregiver states that patient was not eating and only drinking fluids and soup.

## 2020-12-21 NOTE — DIETITIAN INITIAL EVALUATION ADULT. - PROBLEM SELECTOR PLAN 7
: No IVF  E: Replete K>4 Mag>2  N: Full liquid diet  DVT: Lovenox 40mg Q24H  GI: not needed  Code; FULL  Dispo: RMF, PT consult, SW

## 2020-12-21 NOTE — PROGRESS NOTE ADULT - PROBLEM SELECTOR PLAN 1
Pt is unable to state when he was last able to walk, states he is mostly in bed, he states whenever he has tried, he is unable to bear weight on R leg. 3+/5 to RLE and 4+ on LLE, Reflexes 2+ bilaterally and otherwise neurovascularly intact. Very low suspicion for cord compression/CVA. Upon chart review, patient was admitted with similar complaints in the past, CT lumbar spine with moderate bilateral neural foraminal narrowing from L1-S1. No previous neuro work up was done. Pt also generally weak likely in the setting of decreased appetite, decreased po intake (only drinking liquids)  - consider Neurology consult in AM and MR Lumbar spine   - PT consult  - fall precautions  - SW consult Pt is unable to state when he was last able to walk, states he is mostly in bed, he states whenever he has tried, he is unable to bear weight on R leg. 3+/5 to RLE and 4+ on LLE, Reflexes 2+ bilaterally and otherwise neurovascularly intact. Very low suspicion for cord compression/CVA. Upon chart review, patient was admitted with similar complaints in the past, CT lumbar spine with moderate bilateral neural foraminal narrowing from L1-S1. No previous neuro work up was done. Pt also generally weak likely in the setting of decreased appetite, decreased po intake (only drinking liquids)  - no Neurology consult for now  - PT/OT consulted - CHITO  - fall precautions  - SW consult - Mechanical soft diet with thin liquids ( per pt's preference)  - Orthostatism positive this AM, possibly still fluid deployed, will encourage oral intake and re-asses tomorrow Pt is unable to state when he was last able to walk, states he is mostly in bed, he states whenever he has tried, he is unable to bear weight on R leg. 3+/5 to RLE and 4+ on LLE, Reflexes 2+ bilaterally and otherwise neurovascularly intact. Very low suspicion for cord compression/CVA. Upon chart review, patient was admitted with similar complaints in the past, CT lumbar spine with moderate bilateral neural foraminal narrowing from L1-S1. No previous neuro work up was done. Pt also generally weak likely in the setting of decreased appetite, decreased po intake (only drinking liquids)  - no Neurology consult for now  - PT/OT consulted - CHITO  - fall precautions  - SW consult - Mechanical soft diet with thin liquids ( per pt's preference)  - Orthostatism positive this AM, possibly still fluid deployed, will encourage oral intake and re-asses tomorrow  -Nutrition consulted Pt is unable to state when he was last able to walk, states he is mostly in bed, he states whenever he has tried, he is unable to bear weight on R leg. 3+/5 to RLE and 4+ on LLE, Reflexes 2+ bilaterally and otherwise neurovascularly intact. Very low suspicion for cord compression/CVA. Upon chart review, patient was admitted with similar complaints in the past, CT lumbar spine with moderate bilateral neural foraminal narrowing from L1-S1. No previous neuro work up was done. Pt also generally weak likely in the setting of decreased appetite, decreased po intake (only drinking liquids)  - no Neurology consult for now  - repeat CT lumber   - PT/OT consulted - CHITO  - fall precautions  - SW consult - Mechanical soft diet with thin liquids ( per pt's preference)  - Orthostatism positive this AM, possibly still fluid deployed, will encourage oral intake and re-asses tomorrow  -Nutrition consulted

## 2020-12-21 NOTE — OCCUPATIONAL THERAPY INITIAL EVALUATION ADULT - PERTINENT HX OF CURRENT PROBLEM, REHAB EVAL
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

## 2020-12-21 NOTE — SWALLOW BEDSIDE ASSESSMENT ADULT - CONSISTENCIES ADMINISTERED
ice chips x 2, 3 oz thin liquid via tsp and cup sips, puree via tsp x 5, moist cracker x 2, refused regular solid

## 2020-12-21 NOTE — SWALLOW BEDSIDE ASSESSMENT ADULT - SWALLOW EVAL: DIAGNOSIS
Pt presents with functional oral and pharyngeal stages of swallowing with no overt signs & symptoms of airway protection deficits across consistencies tested.  He denied history of dysphagia during my visit.  He is deemed safe to continue with a  po diet with general aspiration precautions.

## 2020-12-21 NOTE — SWALLOW BEDSIDE ASSESSMENT ADULT - SLP GENERAL OBSERVATIONS
Pt received sitting up in bed, awake & alert, pleasant, in NAD.  Pt is a poor historian and demonstrated some confused language.  He followed commands and responded to clinician's questions appropriately.  He denied history of dysphagia, noted that he was eating well and had good appetite PTA.

## 2020-12-21 NOTE — PROGRESS NOTE ADULT - PROBLEM SELECTOR PLAN 5
Lactate elevated to 4.7 on arrival, likely 2/2 dehydration, no hypotension, no signs of hypoperfusion or sepsis. After 3L NS, downtrended to 2.1.   - no longer trending  - encourage po intake Lactate elevated to 4.7 on arrival, likely 2/2 dehydration, no hypotension, no signs of hypoperfusion or sepsis. After 3L NS, downtrended to 2.1.   - no longer trending  RESOLVED  - encourage po intake

## 2020-12-21 NOTE — PROGRESS NOTE ADULT - PROBLEM SELECTOR PLAN 3
Pt with reported history of dysphagia to solid foods, and CT abd with distended stomach. No history of CVA, GERD, or esophageal motility issues   - dysphagia screening and speech and swallow consult Pt with reported history of dysphagia to solid foods, and CT abd with distended stomach. No history of CVA, GERD, or esophageal motility issues   - dysphagia screening and speech and swallow consult  - Mechanical soft diet with thin liquids ( per pt's preference)

## 2020-12-21 NOTE — PROGRESS NOTE ADULT - PROBLEM SELECTOR PLAN 4
EKG on presentation with TWI in inferior and lateral leads, patient is asymptomatic of any chest pain, shortness of breath, palpitations, etc. Trop negative x1  - f/u repeat EKG EKG on presentation with TWI in inferior and lateral leads, patient is asymptomatic of any chest pain, shortness of breath, palpitations, etc. Trop negative x1  - repeat EKG with similar pattern, patient asymptomatic, negative trop, with no dynamic in EKG. Possibly old event. EKG on presentation with TWI in inferior and lateral leads, patient is asymptomatic of any chest pain, shortness of breath, palpitations, etc. Trop negative x1  - repeat EKG with similar pattern, patient asymptomatic, negative trop, with no dynamic in EKG. Possibly old event.  - on previous admission TTE with grade 1 diastolic dysfunction only.

## 2020-12-21 NOTE — PROGRESS NOTE ADULT - PROBLEM SELECTOR PLAN 6
Pt hyponatremic to 133, likely in the setting of decreased solute intake/hypovolemic.   - f/u serum sodium  - low utility in performing urine lytes given 3L fluid resuscitation  - trend BMP Pt hyponatremic to 133, likely in the setting of decreased solute intake/hypovolemic.   - f/u serum sodium  - low utility in performing urine lytes given 3L fluid resuscitation  RESOLVED on 12/21 AM labs

## 2020-12-21 NOTE — OCCUPATIONAL THERAPY INITIAL EVALUATION ADULT - PLANNED THERAPY INTERVENTIONS, OT EVAL
ADL retraining/balance training/bed mobility training/motor coordination training/neuromuscular re-education/parent/caregiver training.../ROM/strengthening/transfer training

## 2020-12-21 NOTE — PHYSICAL THERAPY INITIAL EVALUATION ADULT - ADDITIONAL COMMENTS
Pt reports living with girlfriend, has [address] building and then has elevator access. Pt reports using a RW to ambulate for last few days and did not require any AD prior to that.

## 2020-12-21 NOTE — PHYSICAL THERAPY INITIAL EVALUATION ADULT - PERTINENT HX OF CURRENT PROBLEM, REHAB EVAL
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

## 2020-12-21 NOTE — DIETITIAN INITIAL EVALUATION ADULT. - ORAL INTAKE PTA/DIET HISTORY
As per patient, was eating a lot of soup.per H&P was having some issues related to solid food tolerance, but no denies requesting solid foods.Denied any specific diet and claims girlfriend would do all shopping and cooking.Noted H&P also had landlord purchase food.

## 2020-12-21 NOTE — CONSULT NOTE ADULT - ASSESSMENT
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     Problem/Plan - 1:  ·  Problem: Inability to walk.  Plan: Pt is unable to state when he was last able to walk, states he is mostly in bed, he states whenever he has tried, he is unable to bear weight on R leg. 3+/5 to RLE and 4+ on LLE, Reflexes 2+ bilaterally and otherwise neurovascularly intact. Very low suspicion for cord compression/CVA. Upon chart review, patient was admitted with similar complaints in the past, CT lumbar spine with moderate bilateral neural foraminal narrowing from L1-S1. No previous neuro work up was done. Pt also generally weak likely in the setting of decreased appetite, decreased po intake (only drinking liquids)  - consider Neurology consult in AM and MR Lumbar spine   - PT consult  - fall precautions  - SW consult.     Problem/Plan - 2:  ·  Problem: Weakness of right lower extremity.  Plan: see plan as above.     Problem/Plan - 3:  ·  Problem: Dysphagia.  Plan: Pt with reported history of dysphagia to solid foods, and CT abd with distended stomach. No history of CVA, GERD, or esophageal motility issues   - dysphagia screening and speech and swallow consult.     Problem/Plan - 4:  ·  Problem: T wave inversion in EKG.  Plan: EKG on presentation with TWI in inferior and lateral leads, patient is asymptomatic of any chest pain, shortness of breath, palpitations, etc. Trop negative x1  - f/u repeat EKG.     Problem/Plan - 5:  ·  Problem: Lactate blood increased.  Plan: Lactate elevated to 4.7 on arrival, likely 2/2 dehydration, no hypotension, no signs of hypoperfusion or sepsis. After 3L NS, downtrended to 2.1.   - no longer trending  - encourage po intake.     Problem/Plan - 6:  Problem: Hyponatremia. Plan: Pt hyponatremic to 133, likely in the setting of decreased solute intake/hypovolemic.   - f/u serum sodium  - low utility in performing urine lytes given 3L fluid resuscitation  - trend BMP.    Problem/Plan - 7:  ·  Problem: Nutrition, metabolism, and development symptoms.  Plan: : No IVF  E: Replete K>4 Mag>2  N: Full liquid diet  DVT: Lovenox 40mg Q24H  GI: not needed  Code; FULL  Dispo: RMF, PT consult, SW.

## 2020-12-22 LAB
ANION GAP SERPL CALC-SCNC: 10 MMOL/L — SIGNIFICANT CHANGE UP (ref 5–17)
BUN SERPL-MCNC: 4 MG/DL — LOW (ref 7–23)
CALCIUM SERPL-MCNC: 7.7 MG/DL — LOW (ref 8.4–10.5)
CHLORIDE SERPL-SCNC: 103 MMOL/L — SIGNIFICANT CHANGE UP (ref 96–108)
CO2 SERPL-SCNC: 24 MMOL/L — SIGNIFICANT CHANGE UP (ref 22–31)
CREAT SERPL-MCNC: 0.52 MG/DL — SIGNIFICANT CHANGE UP (ref 0.5–1.3)
GLUCOSE SERPL-MCNC: 73 MG/DL — SIGNIFICANT CHANGE UP (ref 70–99)
HCT VFR BLD CALC: 36 % — LOW (ref 39–50)
HGB BLD-MCNC: 11.7 G/DL — LOW (ref 13–17)
MAGNESIUM SERPL-MCNC: 1.9 MG/DL — SIGNIFICANT CHANGE UP (ref 1.6–2.6)
MCHC RBC-ENTMCNC: 31.5 PG — SIGNIFICANT CHANGE UP (ref 27–34)
MCHC RBC-ENTMCNC: 32.5 GM/DL — SIGNIFICANT CHANGE UP (ref 32–36)
MCV RBC AUTO: 97 FL — SIGNIFICANT CHANGE UP (ref 80–100)
NRBC # BLD: 0 /100 WBCS — SIGNIFICANT CHANGE UP (ref 0–0)
PHOSPHATE SERPL-MCNC: 2.3 MG/DL — LOW (ref 2.5–4.5)
PLATELET # BLD AUTO: 235 K/UL — SIGNIFICANT CHANGE UP (ref 150–400)
POTASSIUM SERPL-MCNC: 3.3 MMOL/L — LOW (ref 3.5–5.3)
POTASSIUM SERPL-SCNC: 3.3 MMOL/L — LOW (ref 3.5–5.3)
RBC # BLD: 3.71 M/UL — LOW (ref 4.2–5.8)
RBC # FLD: 13.7 % — SIGNIFICANT CHANGE UP (ref 10.3–14.5)
SODIUM SERPL-SCNC: 137 MMOL/L — SIGNIFICANT CHANGE UP (ref 135–145)
WBC # BLD: 5.21 K/UL — SIGNIFICANT CHANGE UP (ref 3.8–10.5)
WBC # FLD AUTO: 5.21 K/UL — SIGNIFICANT CHANGE UP (ref 3.8–10.5)

## 2020-12-22 PROCEDURE — 99221 1ST HOSP IP/OBS SF/LOW 40: CPT

## 2020-12-22 PROCEDURE — 99233 SBSQ HOSP IP/OBS HIGH 50: CPT | Mod: GC

## 2020-12-22 RX ORDER — POTASSIUM PHOSPHATE, MONOBASIC POTASSIUM PHOSPHATE, DIBASIC 236; 224 MG/ML; MG/ML
15 INJECTION, SOLUTION INTRAVENOUS ONCE
Refills: 0 | Status: COMPLETED | OUTPATIENT
Start: 2020-12-22 | End: 2020-12-22

## 2020-12-22 RX ORDER — SODIUM CHLORIDE 9 MG/ML
500 INJECTION INTRAMUSCULAR; INTRAVENOUS; SUBCUTANEOUS ONCE
Refills: 0 | Status: COMPLETED | OUTPATIENT
Start: 2020-12-22 | End: 2020-12-22

## 2020-12-22 RX ORDER — FINASTERIDE 5 MG/1
5 TABLET, FILM COATED ORAL DAILY
Refills: 0 | Status: DISCONTINUED | OUTPATIENT
Start: 2020-12-22 | End: 2020-12-23

## 2020-12-22 RX ORDER — POTASSIUM CHLORIDE 20 MEQ
40 PACKET (EA) ORAL ONCE
Refills: 0 | Status: COMPLETED | OUTPATIENT
Start: 2020-12-22 | End: 2020-12-22

## 2020-12-22 RX ADMIN — ENOXAPARIN SODIUM 40 MILLIGRAM(S): 100 INJECTION SUBCUTANEOUS at 21:24

## 2020-12-22 RX ADMIN — SENNA PLUS 1 TABLET(S): 8.6 TABLET ORAL at 21:24

## 2020-12-22 RX ADMIN — SODIUM CHLORIDE 500 MILLILITER(S): 9 INJECTION INTRAMUSCULAR; INTRAVENOUS; SUBCUTANEOUS at 15:30

## 2020-12-22 RX ADMIN — Medication 40 MILLIEQUIVALENT(S): at 09:44

## 2020-12-22 RX ADMIN — POTASSIUM PHOSPHATE, MONOBASIC POTASSIUM PHOSPHATE, DIBASIC 62.5 MILLIMOLE(S): 236; 224 INJECTION, SOLUTION INTRAVENOUS at 11:24

## 2020-12-22 RX ADMIN — FINASTERIDE 5 MILLIGRAM(S): 5 TABLET, FILM COATED ORAL at 12:27

## 2020-12-22 NOTE — CONSULT NOTE ADULT - SUBJECTIVE AND OBJECTIVE BOX
Orthopaedic Surgery Consult Note    CC: Patient is a 70yr old  Male who presents with a chief complaint of Failure to thrive, inability to perform ADLs       HPI:  71yo Male admitted due to being "found down" by his super 2 days ago in his apartment. Patient's live-in girlfriend was admitted 3 days ago and she was concerned about him being home alone. She told the primary team that he has been bed bound for an unknown amount of time and she takes care of him. The super in the apartment checked on the patient and was bringing him food 3 days ago, but 2 days ago, he was found on the floor by the super. Patient states he was using his walker to get to the bathroom when he fell. When asked how/why he fell, he states "because I used the walker". He states that he also fell and was in another hospital a few months ago. He does not know which hospital or what occurred. He does not know how long he has been using the walker. He states that "before that fall I was fine". He states "everything is in the computer". He denies any back pain, neck pain, numbness/tingling, weakness in his extremities. He denies ever seeing anyone about his back.     ROS: Positive for  Denies fevers, chills, headache, dizziness, chest pain, shortness of breath, nausea, vomiting.   All other systems negative, except for above.     Allergies  tetanus immune globulin (Unknown)    PAST MEDICAL & SURGICAL HISTORY:  UTI (urinary tract infection)  Alcohol abuse (sober x 1 year)  Seizures (probably due to alcohol withdrawal)  History of hip replacement    Social History:  Lives in an apartment with longterm girlfriend  Former alcohol abuse, denies any smoking, or illicit drug use    FAMILY HISTORY: No family history of bleeding disorders    Vital Signs Last 24 Hrs  T(C): 36.8 (22 Dec 2020 05:45), Max: 37.6 (21 Dec 2020 21:16)  T(F): 98.3 (22 Dec 2020 05:45), Max: 99.7 (21 Dec 2020 21:16)  HR: 83 (22 Dec 2020 05:45) (83 - 96)  BP: 106/69 (22 Dec 2020 05:45) (89/51 - 130/73)  BP(mean): --  RR: 16 (22 Dec 2020 05:45) (16 - 17)  SpO2: 97% (22 Dec 2020 05:45) (97% - 99%)    PE:  General: Well developed, well nourished, in no acute distress, comfortable  Neuro: Alert, oriented x 3  Psych: Normal mood & affect   Skin: Warm, dry, extremities well perfused, no obvious rash  LUE - sensation intact to light touch, 5/5 /biceps/triceps strength, 2+ radial pulse, no open wounds/erythema/ecchymoses, no tenderness through upper extremity, FROM without pain, no instability or laxity of joints  LLE - sensation intact to light touch, 5/5 EHL/FHL/TA/GS, 4+/5 hip flexion, 2+ DP pulse, no open wounds/erythema/ecchymoses, no tenderness throughout lower extremity, FROM without pain, no instability or laxity of joints, no pitting edema  RUE - sensation intact to light touch, 5/5 /biceps/triceps strength, 2+ radial pulse, no open wounds/erythema/ecchymoses, no tenderness through upper extremity, FROM without pain, no instability or laxity of joints  RLE - sensation intact to light touch, 5/5 EHL/FHL/TA/GS, 3/5 hip flexion, 2+ DP pulse, no open wounds/erythema/ecchymoses, no tenderness throughout lower extremity, FROM without pain, no instability or laxity of joints, no pitting edema  C-spine/T-spine/L-spine - no midline tenderness, no stepoffs, no paraspinal tenderness or palpable spasms, no open wounds/erythema/ecchymoses, FROM without pain, no instability appreciated, no appreciated muscle atrophy, negative Temple's, negative Clonus test, negative Babinski, normal rectal tone                        11.7   5.21  )-----------( 235      ( 22 Dec 2020 08:18 )             36.0     12-22    137  |  103  |  4<L>  ----------------------------<  73  3.3<L>   |  24  |  0.52    Ca    7.7<L>      22 Dec 2020 08:17  Phos  2.3     12-22  Mg     1.9     12-22    TPro  5.1<L>  /  Alb  2.4<L>  /  TBili  0.5  /  DBili  x   /  AST  21  /  ALT  13  /  AlkPhos  55  12-21    Imaging:   CT Lumbar 12/21/2020 -   No acute osseous abnormality or change in alignment of the lumbar spine compared to 09/15/2019, showing a mild levocurvature and minimal grade 1 anterolisthesis at L4-5.    No significant change in multilevel degenerative findings as detailed above. There is multilevel disc bulging which along with ligamentum flavum thickening that results in spinal canal stenosis, to a moderate degree at L3-4 and more mildly L2-3 and L4-5, similar to prior imaging. No large or new disc herniation is visible to explain symptoms. There is right greater than left neural foraminal narrowing at L4-5 and L5-S1 in the presence of facet arthrosis and disc bulging and marginal osteophyte which is perhaps mildly progressed compared to September 2019.      A/P: 71yo Male with bilateral lower extremity weakness of unknown duration. Patient is a poor historian, so it is difficult to ascertain. CT Lumbar       Reviewed imaging and lab data    Above plan discussed with Dr. Robison  Ortho Pager [phone]         Orthopaedic Surgery Consult Note    CC: Patient is a 70yr old  Male who presents with a chief complaint of Failure to thrive, inability to perform ADLs       HPI:  71yo Male admitted due to being "found down" by his super 2 days ago in his apartment. Patient's live-in girlfriend was admitted 3 days ago and she was concerned about him being home alone. She told the primary team that he has been bed bound for an unknown amount of time and she takes care of him. The super in the apartment checked on the patient and was bringing him food 3 days ago, but 2 days ago, he was found on the floor by the super. Patient states he was using his walker to get to the bathroom when he fell. When asked how/why he fell, he states "because I used the walker". He states that he also fell and was in another hospital a few months ago. He does not know which hospital or what occurred. He does not know how long he has been using the walker. He states that "before that fall I was fine". He states "everything is in the computer". He denies any back pain, neck pain, numbness/tingling, weakness in his extremities. He denies ever seeing anyone about his back.     ROS: Positive for  Denies fevers, chills, headache, dizziness, chest pain, shortness of breath, nausea, vomiting.   All other systems negative, except for above.     Allergies  tetanus immune globulin (Unknown)    PAST MEDICAL & SURGICAL HISTORY:  UTI (urinary tract infection)  Alcohol abuse (sober x 1 year)  Seizures (probably due to alcohol withdrawal)  History of hip replacement    Social History:  Lives in an apartment with longterm girlfriend  Former alcohol abuse, denies any smoking, or illicit drug use    FAMILY HISTORY: No family history of bleeding disorders    Vital Signs Last 24 Hrs  T(C): 36.8 (22 Dec 2020 05:45), Max: 37.6 (21 Dec 2020 21:16)  T(F): 98.3 (22 Dec 2020 05:45), Max: 99.7 (21 Dec 2020 21:16)  HR: 83 (22 Dec 2020 05:45) (83 - 96)  BP: 106/69 (22 Dec 2020 05:45) (89/51 - 130/73)  BP(mean): --  RR: 16 (22 Dec 2020 05:45) (16 - 17)  SpO2: 97% (22 Dec 2020 05:45) (97% - 99%)    PE:  General: Well developed, well nourished, in no acute distress, comfortable  Neuro: Alert, oriented x 3  Psych: Normal mood & affect   Skin: Warm, dry, extremities well perfused, no obvious rash  LUE - sensation intact to light touch, 5/5 /biceps/triceps strength, 2+ radial pulse, no open wounds/erythema/ecchymoses, no tenderness through upper extremity, FROM without pain, no instability or laxity of joints  LLE - sensation intact to light touch, 5/5 EHL/FHL/TA/GS, 4+/5 hip flexion, 2+ DP pulse, no open wounds/erythema/ecchymoses, no tenderness throughout lower extremity, FROM without pain, no instability or laxity of joints, no pitting edema  RUE - sensation intact to light touch, 5/5 /biceps/triceps strength, 2+ radial pulse, no open wounds/erythema/ecchymoses, no tenderness through upper extremity, FROM without pain, no instability or laxity of joints  RLE - sensation intact to light touch, 5/5 EHL/FHL/TA/GS, 3/5 hip flexion, 2+ DP pulse, no open wounds/erythema/ecchymoses, no tenderness throughout lower extremity, FROM without pain, no instability or laxity of joints, no pitting edema  C-spine/T-spine/L-spine - no midline tenderness, no stepoffs, no paraspinal tenderness or palpable spasms, no open wounds/erythema/ecchymoses, FROM without pain, no instability appreciated, no appreciated muscle atrophy, negative Temple's, negative Clonus test, negative Babinski, normal rectal tone                        11.7   5.21  )-----------( 235      ( 22 Dec 2020 08:18 )             36.0     12-22    137  |  103  |  4<L>  ----------------------------<  73  3.3<L>   |  24  |  0.52    Ca    7.7<L>      22 Dec 2020 08:17  Phos  2.3     12-22  Mg     1.9     12-22    TPro  5.1<L>  /  Alb  2.4<L>  /  TBili  0.5  /  DBili  x   /  AST  21  /  ALT  13  /  AlkPhos  55  12-21    Imaging:   CT Lumbar 12/21/2020 -   No acute osseous abnormality or change in alignment of the lumbar spine compared to 09/15/2019, showing a mild levocurvature and minimal grade 1 anterolisthesis at L4-5.    No significant change in multilevel degenerative findings as detailed above. There is multilevel disc bulging which along with ligamentum flavum thickening that results in spinal canal stenosis, to a moderate degree at L3-4 and more mildly L2-3 and L4-5, similar to prior imaging. No large or new disc herniation is visible to explain symptoms. There is right greater than left neural foraminal narrowing at L4-5 and L5-S1 in the presence of facet arthrosis and disc bulging and marginal osteophyte which is perhaps mildly progressed compared to September 2019.      A/P: 71yo Male with bilateral lower extremity weakness of unknown duration. Patient is a poor historian, so it is difficult to ascertain. CT Lumbar is similar to CT in 09/2019.   - Agree with MRI lumbar spine   - PT, WBAT BLE   - No orthopedic intervention at this time pending MRI results  - Reviewed imaging and lab data    - Above plan discussed with Dr. Robison    Ortho Pager [phone]

## 2020-12-22 NOTE — PROGRESS NOTE ADULT - PROBLEM SELECTOR PLAN 6
Pt hyponatremic to 133, likely in the setting of decreased solute intake/hypovolemic.   - f/u serum sodium  - low utility in performing urine lytes given 3L fluid resuscitation  RESOLVED on 12/21 AM labs

## 2020-12-22 NOTE — PROGRESS NOTE ADULT - SUBJECTIVE AND OBJECTIVE BOX
**INCOMPLETE NOTE    OVERNIGHT EVENTS:    SUBJECTIVE:  Patient seen and examined at bedside.    Vital Signs Last 12 Hrs  T(F): 98.3 (20 @ 05:45), Max: 99.7 (20 @ 21:16)  HR: 83 (20 @ 05:45) (83 - 94)  BP: 106/69 (20 @ 05:45) (89/51 - 127/67)  BP(mean): --  RR: 16 (20 @ 05:45) (16 - 16)  SpO2: 97% (20 @ 05:45) (97% - 99%)  I&O's Summary    20 Dec 2020 07:01  -  21 Dec 2020 07:00  --------------------------------------------------------  IN: 0 mL / OUT: 950 mL / NET: -950 mL    21 Dec 2020 07:01  -  22 Dec 2020 06:48  --------------------------------------------------------  IN: 0 mL / OUT: 650 mL / NET: -650 mL        PHYSICAL EXAM:  Constitutional: NAD, comfortable in bed.  HEENT: NC/AT, PERRLA, EOMI, no conjunctival pallor or scleral icterus, MMM  Neck: Supple, no JVD  Respiratory: CTA B/L. No w/r/r.   Cardiovascular: RRR, normal S1 and S2, no m/r/g.   Gastrointestinal: +BS, soft NTND, no guarding or rebound tenderness, no palpable masses   Extremities: wwp; no cyanosis, clubbing or edema.   Vascular: Pulses equal and strong throughout.   Neurological: AAOx3, no CN deficits, strength and sensation intact throughout.   Skin: No gross skin abnormalities or rashes        LABS:                        11.4   5.77  )-----------( 264      ( 21 Dec 2020 06:07 )             34.9         139  |  104  |  7   ----------------------------<  92  3.9   |  27  |  0.51    Ca    7.8<L>      21 Dec 2020 16:47  Phos  3.0     12  Mg     1.7         TPro  5.1<L>  /  Alb  2.4<L>  /  TBili  0.5  /  DBili  x   /  AST  21  /  ALT  13  /  AlkPhos  55  12    PT/INR - ( 20 Dec 2020 09:00 )   PT: 14.1 sec;   INR: 1.18          PTT - ( 20 Dec 2020 09:00 )  PTT:28.8 sec  Urinalysis Basic - ( 21 Dec 2020 05:22 )    Color: Yellow / Appearance: Clear / S.010 / pH: x  Gluc: x / Ketone: NEGATIVE  / Bili: Negative / Urobili: 0.2 E.U./dL   Blood: x / Protein: NEGATIVE mg/dL / Nitrite: NEGATIVE   Leuk Esterase: NEGATIVE / RBC: x / WBC x   Sq Epi: x / Non Sq Epi: x / Bacteria: x          RADIOLOGY & ADDITIONAL TESTS:    MEDICATIONS  (STANDING):  enoxaparin Injectable 40 milliGRAM(s) SubCutaneous every 24 hours  senna 1 Tablet(s) Oral at bedtime    MEDICATIONS  (PRN):  polyethylene glycol 3350 17 Gram(s) Oral daily PRN Constipation   INCOMPLETE     Hospital Course:  70M with PMH alcohol withdrawal seizures, now sober for >1 year, OA s/p L hip arthroplasty, hyponatremia metabolic encephalopathy, UTI w/ history of urinary retention, presenting with weakness, failure to thrive, and inability to walk/perform ADLs, RLE>LLE weakness, hyponatremia. Pt states he has been largely been in bed after a fall but does use a walker to get up and moving which he did not use previously. However, he is unable to specify when the fall was. Yesterday patient completed CT lumber spine that showed No significant change in multilevel degenerative findings, There is multilevel disc bulging that results in spinal canal stenosis. There is right greater than left neural foraminal narrowing at L4-5 and L5-S1 in the presence of facet arthrosis and disc bulging and marginal osteophyte which is perhaps mildly progressed compared to 2019. due to the new? neuro findings and findings on CT scan, MRI spin and Ortho were consulted to r/o spinal compression (has no inconstance per ROS). Patient denies h/n/v/d, fever, chills, cp, palpitations, sob, abd pain, leg swelling, rashes, dysuria, and changes in BM.     OVERNIGHT EVENTS: Soft BP over night, 89/60, as yesterday. Not symptomatic. Retention again got straight cath by Urology after 2 fail attempts by nursing.    SUBJECTIVE:  Patient seen and examined at bedside. Patient has no complaints. Patient denies h/n/v/d, fever, chills, cp, palpitations, sob, abd pain, leg swelling, rashes, dysuria, and changes in BM.     Vital Signs Last 12 Hrs  T(F): 98.3 (20 @ 05:45), Max: 99.7 (20 @ 21:16)  HR: 83 (20 @ 05:45) (83 - 94)  BP: 106/69 (20 @ 05:45) (89/51 - 127/67)  BP(mean): --  RR: 16 (20 @ 05:45) (16 - 16)  SpO2: 97% (20 @ 05:45) (97% - 99%)  I&O's Summary    20 Dec 2020 07:01  -  21 Dec 2020 07:00  --------------------------------------------------------  IN: 0 mL / OUT: 950 mL / NET: -950 mL    21 Dec 2020 07:01  -  22 Dec 2020 06:48  --------------------------------------------------------  IN: 0 mL / OUT: 650 mL / NET: -650 mL        PHYSICAL EXAM:  Constitutional: NAD, comfortable in bed.  HEENT: NC/AT, PERRLA, EOMI, no conjunctival pallor or scleral icterus, MMM  Neck: Supple, no JVD  Respiratory: CTA B/L. No w/r/r.   Cardiovascular: RRR, normal S1 and S2, no m/r/g.   Gastrointestinal: +BS, soft NTND, no guarding or rebound tenderness, no palpable masses   Extremities: wwp; no cyanosis, clubbing or edema.   Vascular: Pulses equal and strong throughout.   Neurological: AAOx3, no CN deficits, strength and sensation intact throughout.   Skin: No gross skin abnormalities or rashes        LABS:                        11.4   5.77  )-----------( 264      ( 21 Dec 2020 06:07 )             34.9     12-    139  |  104  |  7   ----------------------------<  92  3.9   |  27  |  0.51    Ca    7.8<L>      21 Dec 2020 16:47  Phos  3.0     12-  Mg     1.7         TPro  5.1<L>  /  Alb  2.4<L>  /  TBili  0.5  /  DBili  x   /  AST  21  /  ALT  13  /  AlkPhos  55  12-    PT/INR - ( 20 Dec 2020 09:00 )   PT: 14.1 sec;   INR: 1.18          PTT - ( 20 Dec 2020 09:00 )  PTT:28.8 sec  Urinalysis Basic - ( 21 Dec 2020 05:22 )    Color: Yellow / Appearance: Clear / S.010 / pH: x  Gluc: x / Ketone: NEGATIVE  / Bili: Negative / Urobili: 0.2 E.U./dL   Blood: x / Protein: NEGATIVE mg/dL / Nitrite: NEGATIVE   Leuk Esterase: NEGATIVE / RBC: x / WBC x   Sq Epi: x / Non Sq Epi: x / Bacteria: x          RADIOLOGY & ADDITIONAL TESTS:    MEDICATIONS  (STANDING):  enoxaparin Injectable 40 milliGRAM(s) SubCutaneous every 24 hours  senna 1 Tablet(s) Oral at bedtime    MEDICATIONS  (PRN):  polyethylene glycol 3350 17 Gram(s) Oral daily PRN Constipation   Hospital Course:  70M with PMH alcohol withdrawal seizures, now sober for >1 year, OA s/p L hip arthroplasty, hyponatremia metabolic encephalopathy, UTI w/ history of urinary retention, presenting with weakness, failure to thrive, and inability to walk/perform ADLs, RLE>LLE weakness, hyponatremia. Pt states he has been largely been in bed after a fall but does use a walker to get up and moving which he did not use previously. However, he is unable to specify when the fall was. Yesterday patient completed CT lumber spine that showed No significant change in multilevel degenerative findings, There is multilevel disc bulging that results in spinal canal stenosis. There is right greater than left neural foraminal narrowing at L4-5 and L5-S1 in the presence of facet arthrosis and disc bulging and marginal osteophyte which is perhaps mildly progressed compared to 2019. due to the new? neuro findings and findings on CT scan, MRI spin and Ortho were consulted to r/o spinal compression (has no inconstance per ROS). Patient denies h/n/v/d, fever, chills, cp, palpitations, sob, abd pain, leg swelling, rashes, dysuria, and changes in BM.     OVERNIGHT EVENTS: Soft BP over night, 89/60, as yesterday. Not symptomatic. Retention again got straight cath by Urology after 2 fail attempts by nursing.    SUBJECTIVE:  Patient seen and examined at bedside. Patient has no complaints. Patient denies h/n/v/d, fever, chills, cp, palpitations, sob, abd pain, leg swelling, rashes, dysuria, and changes in BM.     Vital Signs Last 12 Hrs  T(F): 98.3 (20 @ 05:45), Max: 99.7 (20 @ 21:16)  HR: 83 (20 @ 05:45) (83 - 94)  BP: 106/69 (20 @ 05:45) (89/51 - 127/67)  BP(mean): --  RR: 16 (20 @ 05:45) (16 - 16)  SpO2: 97% (20 @ 05:45) (97% - 99%)  I&O's Summary    20 Dec 2020 07:01  -  21 Dec 2020 07:00  --------------------------------------------------------  IN: 0 mL / OUT: 950 mL / NET: -950 mL    21 Dec 2020 07:01  -  22 Dec 2020 06:48  --------------------------------------------------------  IN: 0 mL / OUT: 650 mL / NET: -650 mL    PHYSICAL EXAM:  Constitutional: NAD, comfortable in bed.  HEENT: NC/AT, Baseline anisocoria, PERRLA, EOMI, no conjunctival pallor or scleral icterus, MMM  Neck: Supple, no JVD  Respiratory: CTA B/L. No w/r/r.   Cardiovascular: RRR, normal S1 and S2, no m/r/g.   Gastrointestinal: +BS, soft NTND, no guarding or rebound tenderness, no palpable masses   Extremities: wwp; no cyanosis, clubbing or edema.   Vascular: Pulses equal and strong throughout.   Neurological: AAOx3, no CN deficits, strength and sensation intact throughout. 5/5 strength in bilateral proximal and distal upper extremities  4+/5 strength to LLE, 3-/5 strength to RLE upon hip flexion and knee flexion  Skin: No gross skin abnormalities or rashes    LABS:                        11.4   5.77  )-----------( 264      ( 21 Dec 2020 06:07 )             34.9     12-    139  |  104  |  7   ----------------------------<  92  3.9   |  27  |  0.51    Ca    7.8<L>      21 Dec 2020 16:47  Phos  3.0     12-  Mg     1.7         TPro  5.1<L>  /  Alb  2.4<L>  /  TBili  0.5  /  DBili  x   /  AST  21  /  ALT  13  /  AlkPhos  55  12-    PT/INR - ( 20 Dec 2020 09:00 )   PT: 14.1 sec;   INR: 1.18          PTT - ( 20 Dec 2020 09:00 )  PTT:28.8 sec  Urinalysis Basic - ( 21 Dec 2020 05:22 )    Color: Yellow / Appearance: Clear / S.010 / pH: x  Gluc: x / Ketone: NEGATIVE  / Bili: Negative / Urobili: 0.2 E.U./dL   Blood: x / Protein: NEGATIVE mg/dL / Nitrite: NEGATIVE   Leuk Esterase: NEGATIVE / RBC: x / WBC x   Sq Epi: x / Non Sq Epi: x / Bacteria: x          RADIOLOGY & ADDITIONAL TESTS:    MEDICATIONS  (STANDING):  enoxaparin Injectable 40 milliGRAM(s) SubCutaneous every 24 hours  senna 1 Tablet(s) Oral at bedtime    MEDICATIONS  (PRN):  polyethylene glycol 3350 17 Gram(s) Oral daily PRN Constipation

## 2020-12-22 NOTE — PROGRESS NOTE ADULT - PROBLEM SELECTOR PLAN 7
: No IVF  E: Replete K>4 Mag>2  N: Full liquid diet  DVT: Lovenox 40mg Q24H  GI: not needed  Code; FULL  Dispo: RMF, PT consult, SW
: No IVF  E: Replete K>4 Mag>2  N: Full liquid diet  DVT: Lovenox 40mg Q24H  GI: not needed  Code; FULL  Dispo: RMF, PT consult, SW

## 2020-12-22 NOTE — PROGRESS NOTE ADULT - PROBLEM SELECTOR PLAN 5
Lactate elevated to 4.7 on arrival, likely 2/2 dehydration, no hypotension, no signs of hypoperfusion or sepsis. After 3L NS, downtrended to 2.1.   - no longer trending  RESOLVED  - encourage po intake

## 2020-12-22 NOTE — CONSULT NOTE ADULT - ATTENDING COMMENTS
I have personally seen, examined and participated in the care of this patient.  I have reviewed all pertinent clinical information, including history, physical exam, plan and the PA's note.  I agree with the above.  J Carlos Slaughter is a 70 year old male admitted after a fall at home.  On exam he is neurologically intact.  He denies any radicular pain or back pain.  Will followup lumbar MRI.  OOB with assistance.  Care per primary team.

## 2020-12-22 NOTE — PROGRESS NOTE ADULT - PROBLEM SELECTOR PLAN 2
see plan as above    #Urine retention  RESOLVED as for repeat bladder scan with <300cc.   -f/u bladder scans Patient with LE weakness, RLE 3-/5, LLE 4/5 s/p hip replacement  Due to LE weakness and new urine retention, following CT lumbar scan, will complete MRI spin to r/o spinal cord compression  -f/u MRI lumbar spin for spinal cord compression r/o  -f/u Ortho recs  -rest plan as above    #Urine retention  Patient with alternating urine retention and straight cath done by Urology  -f/u bladder scans  -consider Olmos for patient comfort if keep failing

## 2020-12-22 NOTE — PROGRESS NOTE ADULT - PROBLEM SELECTOR PLAN 1
Pt is unable to state when he was last able to walk, states he is mostly in bed, he states whenever he has tried, he is unable to bear weight on R leg. 3+/5 to RLE and 4+ on LLE, Reflexes 2+ bilaterally and otherwise neurovascularly intact. Very low suspicion for cord compression/CVA. Upon chart review, patient was admitted with similar complaints in the past, CT lumbar spine with moderate bilateral neural foraminal narrowing from L1-S1. No previous neuro work up was done. Pt also generally weak likely in the setting of decreased appetite, decreased po intake (only drinking liquids)  - no Neurology consult for now  - repeat CT lumber   - PT/OT consulted - [facility]  - fall precautions  - SW consult - Mechanical soft diet with thin liquids ( per pt's preference)  - Orthostatism positive this AM, possibly still fluid deployed, will encourage oral intake and re-asses tomorrow  -Nutrition consulted Pt is unable to state when he was last able to walk, states he is mostly in bed, he states whenever he has tried, he is unable to bear weight on R leg. 3+/5 to RLE and 4+ on LLE, Reflexes 2+ bilaterally and otherwise neurovascularly intact. Very low suspicion for cord compression/CVA. Upon chart review, patient was admitted with similar complaints in the past, CT lumbar spine with moderate bilateral neural foraminal narrowing from L1-S1. No previous neuro work up was done. Pt also generally weak likely in the setting of decreased appetite, decreased po intake (only drinking liquids)  - no Neurology consult for now  - CT lumber - No significant change, There is multilevel disc bulging that results in spinal canal stenosis. There is R>L neural foraminal narrowing at L4-5 and L5-S1 in the presence of facet arthrosis and disc bulging and marginal osteophyte which is perhaps mildly progressed compared to September 2019.  - PT/OT consulted - [facility]  - fall precautions  - SW consult - Mechanical soft diet with thin liquids ( per pt's preference)  - Orthostatism positive x2, possibly still fluid deployed, 500NS bolus given.  - f/u Nutrition recs

## 2020-12-22 NOTE — PROGRESS NOTE ADULT - ATTENDING COMMENTS
70M w prior h/o EtOH w/d sz, L MALIKA, prior UTI w retention, p/w after being found down (partner/caretaker currently also admitted due to fall), found to have failure to thrive, RLE>LLE weakness, hyponatremia, currently     Pt needed to be straight cathed overnight. Requesting not be straight cathed again. Otherwise pt complaining only of needing assistance to sit up and stand. Denying lightheadedness or dizziness. Exam shows similar level of weakness in R hip > L hip flexion. No numbness.   #Ambulatory dysfunction - difficult to determine as pt unable to state chronicity or weakness of fall and live-in partner also cannot remember. Minimal asymmetry on examination - CT Lumbar spine shows no acute changes however there is R>L foraminal narrowing present.  #Colitis - seen on CT. No GI sxs and eating wo issue  #Urinary retention - likely 2/2 immobility.    - Starting on finasteride. Holding flomax due to borderline BP/Orthostasis  #Elevated Lactate - Resolved 2 from 3.7  #Hyponatremia -  Resolved.   #Normocytic anemia - stable. 11.7 from 11.4. likely dilutional 11 from 15 in setting of all counts decreasing on admission.   #Hypokalemia - likely 2/2 poor PO intake; add on Mg and PO4    --F/U SLP: Dysphagia 2 - mechanical soft   --MR Lumbar spine to further evaluate etiology of asymmetric weakness in setting of fall of uncertain chronicity  --Ortho C/S  --Repeat Orthostatics - positive w supine to sit however pt not endorsing symptoms  --Encourage OOB to chair and PO/Fluid intake    DISPO: [facility]O pending MRI, Final ortho recs
70M w prior h/o EtOH w/d sz, L MALIKA, prior UTI w retention, p/w after being found down (partner/caretaker currently also admitted due to fall), found to have failure to thrive, RLE>LLE weakness, hyponatremia    Pt states he has been largely been in bed after a fall but does use a walker to get up and moving which he did not use previously. However, he is unable to specify when the fall was. Unfortunately his partner is also unable to coroborate on the timing of his fall. Denies any pain, numbness, chest pain, dyspnea, dysuria  Exam: male in NAD on RA, MMM, RRR, nml effort, CTAB, Abd soft, NABS, non-tender, A/O x2 - does not recall name of hospital. 5/5 in BUE, CN II-XI grossly intact. BLE - 4+/5 in L hip, knee, and plantarflex/ext. 4- in R hip, knee flexion. 4+/5 in R plantar flex/ext. Sensory is equal.   #Ambulatory dysfunction - difficult to determine as pt unable to state chronicity or weakness of fall and live-in partner also cannot remember. Minimal asymmetry on examination - will begin evaluation w CT lumbar spine  #Colitis - seen on CT. No GI sxs and eating wo issue  #Urinary retention - likely 2/2 immobility  #Elevated Lactate - improving 2 from 3.7  #Hyponatremia - 133 from 139 - improved.   #Normocytic anemia - likely dilutional 11 from 15 in setting of all counts decreasing on admission.   #Hypokalemia - likely 2/2 poor PO intake; add on Mg and PO4    --F/U SLP: Dysphagia 2 - mechanical soft   --CT Lumbar spine   --PT eval  --Encourage PO/Fluid intake  --F/U bladder scan - if still retaining urine would begin finasteride if still concern for orthostasis    DISPO: [facility]

## 2020-12-22 NOTE — PROVIDER CONTACT NOTE (OTHER) - ASSESSMENT
felt a resistance when RN was trying to do straight cath.pt complained of severe pain once catheter was inserted .felt some resistance while advancing the catheter.no drainage came out.

## 2020-12-22 NOTE — PROGRESS NOTE ADULT - PROBLEM SELECTOR PLAN 3
Pt with reported history of dysphagia to solid foods, and CT abd with distended stomach. No history of CVA, GERD, or esophageal motility issues   - dysphagia screening and speech and swallow consult  - Mechanical soft diet with thin liquids ( per pt's preference)

## 2020-12-22 NOTE — PROGRESS NOTE ADULT - PROBLEM SELECTOR PLAN 4
EKG on presentation with TWI in inferior and lateral leads, patient is asymptomatic of any chest pain, shortness of breath, palpitations, etc. Trop negative x1  - repeat EKG with similar pattern, patient asymptomatic, negative trop, with no dynamic in EKG. Possibly old event.  - on previous admission TTE with grade 1 diastolic dysfunction only.

## 2020-12-23 ENCOUNTER — TRANSCRIPTION ENCOUNTER (OUTPATIENT)
Age: 70
End: 2020-12-23

## 2020-12-23 VITALS
HEART RATE: 88 BPM | OXYGEN SATURATION: 98 % | DIASTOLIC BLOOD PRESSURE: 77 MMHG | RESPIRATION RATE: 17 BRPM | TEMPERATURE: 98 F | SYSTOLIC BLOOD PRESSURE: 115 MMHG

## 2020-12-23 LAB
ANION GAP SERPL CALC-SCNC: 10 MMOL/L — SIGNIFICANT CHANGE UP (ref 5–17)
BUN SERPL-MCNC: 4 MG/DL — LOW (ref 7–23)
CALCIUM SERPL-MCNC: 8.2 MG/DL — LOW (ref 8.4–10.5)
CHLORIDE SERPL-SCNC: 103 MMOL/L — SIGNIFICANT CHANGE UP (ref 96–108)
CO2 SERPL-SCNC: 26 MMOL/L — SIGNIFICANT CHANGE UP (ref 22–31)
CREAT SERPL-MCNC: 0.56 MG/DL — SIGNIFICANT CHANGE UP (ref 0.5–1.3)
GLUCOSE SERPL-MCNC: 88 MG/DL — SIGNIFICANT CHANGE UP (ref 70–99)
HCT VFR BLD CALC: 37.4 % — LOW (ref 39–50)
HGB BLD-MCNC: 12.3 G/DL — LOW (ref 13–17)
MAGNESIUM SERPL-MCNC: 1.9 MG/DL — SIGNIFICANT CHANGE UP (ref 1.6–2.6)
MCHC RBC-ENTMCNC: 31.8 PG — SIGNIFICANT CHANGE UP (ref 27–34)
MCHC RBC-ENTMCNC: 32.9 GM/DL — SIGNIFICANT CHANGE UP (ref 32–36)
MCV RBC AUTO: 96.6 FL — SIGNIFICANT CHANGE UP (ref 80–100)
NRBC # BLD: 0 /100 WBCS — SIGNIFICANT CHANGE UP (ref 0–0)
PHOSPHATE SERPL-MCNC: 3.6 MG/DL — SIGNIFICANT CHANGE UP (ref 2.5–4.5)
PLATELET # BLD AUTO: 233 K/UL — SIGNIFICANT CHANGE UP (ref 150–400)
POTASSIUM SERPL-MCNC: 4.1 MMOL/L — SIGNIFICANT CHANGE UP (ref 3.5–5.3)
POTASSIUM SERPL-SCNC: 4.1 MMOL/L — SIGNIFICANT CHANGE UP (ref 3.5–5.3)
RBC # BLD: 3.87 M/UL — LOW (ref 4.2–5.8)
RBC # FLD: 13.5 % — SIGNIFICANT CHANGE UP (ref 10.3–14.5)
SODIUM SERPL-SCNC: 139 MMOL/L — SIGNIFICANT CHANGE UP (ref 135–145)
T4 FREE SERPL-MCNC: 0.99 NG/DL — SIGNIFICANT CHANGE UP (ref 0.7–1.48)
WBC # BLD: 4.92 K/UL — SIGNIFICANT CHANGE UP (ref 3.8–10.5)
WBC # FLD AUTO: 4.92 K/UL — SIGNIFICANT CHANGE UP (ref 3.8–10.5)

## 2020-12-23 PROCEDURE — 72148 MRI LUMBAR SPINE W/O DYE: CPT | Mod: 26

## 2020-12-23 PROCEDURE — 99239 HOSP IP/OBS DSCHRG MGMT >30: CPT | Mod: GC

## 2020-12-23 RX ORDER — FINASTERIDE 5 MG/1
1 TABLET, FILM COATED ORAL
Qty: 0 | Refills: 0 | DISCHARGE
Start: 2020-12-23

## 2020-12-23 RX ORDER — POLYETHYLENE GLYCOL 3350 17 G/17G
17 POWDER, FOR SOLUTION ORAL
Qty: 0 | Refills: 0 | DISCHARGE
Start: 2020-12-23

## 2020-12-23 RX ADMIN — FINASTERIDE 5 MILLIGRAM(S): 5 TABLET, FILM COATED ORAL at 12:13

## 2020-12-23 NOTE — DISCHARGE NOTE NURSING/CASE MANAGEMENT/SOCIAL WORK - PATIENT PORTAL LINK FT
You can access the FollowMyHealth Patient Portal offered by [facility] by registering at the following website: [email or web address]. By joining [employer]’s FollowMyHealth portal, you will also be able to view your health information using other applications (apps) compatible with our system.

## 2020-12-23 NOTE — DISCHARGE NOTE PROVIDER - HOSPITAL COURSE
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 CT lumber spine that showed No significant change in multilevel degenerative findings, There is multilevel disc bulging that results in spinal canal stenosis. There is right greater than left neural foraminal narrowing at L4-5 and L5-S1 in the presence of facet arthrosis and disc bulging and marginal osteophyte which is perhaps mildly progressed compared to September 2019. MRI spin w/o cord compression, Ortho recs no surgical intervention at this time. Patient medically stable for DC    #Ambulatory dysfunction - CT Lumbar spine shows no acute changes however there is R>L foraminal narrowing present. MRI w/o cord compression. Most likley from decondition s/p fall and lack of assitance  #Colitis - seen on CT. No GI sxs and eating wo issue  #Urinary retention - likely 2/2 immobility, holding flowmax 2/2 to orthostatic BP's c/w finasteride, intermittent straight cath  #Elevated Lactate - Resolved s/p fluids  #Hyponatremia -  Resolved.   #Normocytic anemia - stable. 11.7 from 11.4. likely dilutional 11 from 15 in setting of all counts decreasing on admission.   #Hypokalemia - likely 2/2 poor PO intake; add on Mg and PO4  #Dysphagia: dysphagia diet, no acute intervention    New meds: finesteride 5mg  labs to follow-up: none  images to follow-up: none

## 2020-12-23 NOTE — PROGRESS NOTE ADULT - SUBJECTIVE AND OBJECTIVE BOX
Ortho Note    Patient seen by Dr. Robison. MRI reviewed - no cord compression. No acute orthopaedic surgery intervention at this time, f/u outpatient.     Juan F Edwards PA-C  [phone]

## 2020-12-23 NOTE — PROGRESS NOTE ADULT - REASON FOR ADMISSION
Failure to thrive, inability to perform ADLs

## 2020-12-23 NOTE — DISCHARGE NOTE PROVIDER - NSDCMRMEDTOKEN_GEN_ALL_CORE_FT
finasteride 5 mg oral tablet: 1 tab(s) orally once a day  polyethylene glycol 3350 oral powder for reconstitution: 17 gram(s) orally once a day, As needed, Constipation

## 2020-12-23 NOTE — DISCHARGE NOTE PROVIDER - PROVIDER TOKENS
FREE:[LAST:[[facility]],PHONE:[phone]],FAX:[   [phone]    -],ADDRESS:[Please follow-up with [facility]]] FREE:[LAST:[[facility]],PHONE:[phone]],FAX:[(   )    -],SCHEDULEDAPPT:[01/11/2021],SCHEDULEDAPPTTIME:[09:00 AM]]

## 2020-12-23 NOTE — DISCHARGE NOTE PROVIDER - CARE PROVIDER_API CALL
[facility],   [facility]  Phone: [phone]    -  Fax: (   )    -  Follow Up Time:    [facility],   Phone: [phone]  Fax: (   )    -  Scheduled Appointment: 01/11/2021 09:00 AM

## 2020-12-23 NOTE — PROVIDER CONTACT NOTE (OTHER) - SITUATION
BP 92/57  pt denies any discomfort..MD Child made aware
bladder scan result  done at 12:30am was 367 .straight cath done by RN was unsuccessful..MD Schoen feld was aware.

## 2020-12-23 NOTE — PROGRESS NOTE ADULT - SUBJECTIVE AND OBJECTIVE BOX
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 Of note, patient's significant other is also admitted to the hospital, and due to her concerns for her inability to take care of him, social work/ called 911 3 days ago. He had a wellness check but was not brought to the ED at that time. Of note, he has been mostly "bed bound" due to inability to bear weight on R leg, when prompted about reasons, he is evasive and states he does not remember and asks for us to look at his previous chart. Patient is not a reliable historian, however he is alert and oriented x3. He states he has been feeling fine overall with no acute complaints but he has not been able to get up out of bed, his super has been bringing him water/food. Today the super called EMS, because patient was found down on the floor at least since yesterday (which was when he last checked on him). Patient has trouble remembering the timeline of his symptoms, and when the last time he was seen by a doctor. Upon review of systems, he admits to difficulty with swallowing solid foods, but no problems with fluids. He states he usually vomits when attempting to eat solid food. He is unable to say duration of symptoms. He does report unspecified amount of weight loss, denies any fevers, chills, chest pain, chest tightness, abdominal pain, diarrhea, constipation, melena, hematochezia, dysuria, numbness,  joint pain, joint swelling, lightheadedness, dizziness, or any other complaints at this time. Of note patient was admitted back from 09/15/19-09/17/19 with similar presentation, at that time pt had come in from [facility] with LLE weakness and pain. At that time patient underwent CT lumbar as well, which revealed moderate canal stenosis and moderate neural foraminal narrowing. He was discharged to [facility] at that time.     In the ED: Initial vital signs: T: 98.1, HR: 122 --> 98 , BP: 97/68 mmHg, R: 18, SpO2: 98% on RA  ED course: s/p 3L NS, PO 40mEq KCl x1  Labs: significant for no leukocytosis, BMP with Na 133, lactate 4.7 -> 2.1. UA with nitrites and bacteria but no WBCs or leukocyte esterase   Imaging: CXR: No acute infiltrates   XR pelvis: s/p L hip replacement, no acute fracture or dislocation   CT Abd: mild bowel wall thickening, in ascending and proximal transverse colon, may reflect chronic colitis. mild to moderate distended stomach.   EKG: Sinus tachycardia , TWI in inferior and lateral leads,   Medications: none   Consults: none  (20 Dec 2020 14:59)                            12.3   4.92  )-----------( 233      ( 23 Dec 2020 06:57 )             37.4       12-23    139  |  103  |  4<L>  ----------------------------<  88  4.1   |  26  |  0.56    Ca    8.2<L>      23 Dec 2020 06:57  Phos  3.6     12-23  Mg     1.9     12-23      Vital Signs Last 24 Hrs  T(C): 36.9 (23 Dec 2020 12:59), Max: 37.3 (22 Dec 2020 20:37)  T(F): 98.4 (23 Dec 2020 12:59), Max: 99.1 (22 Dec 2020 20:37)  HR: 88 (23 Dec 2020 12:59) (88 - 89)  BP: 115/77 (23 Dec 2020 12:59) (92/57 - 121/77)  BP(mean): --  RR: 17 (23 Dec 2020 12:59) (17 - 18)  SpO2: 98% (23 Dec 2020 12:59) (98% - 99%)    MEDICATIONS  (STANDING):  enoxaparin Injectable 40 milliGRAM(s) SubCutaneous every 24 hours  finasteride 5 milliGRAM(s) Oral daily  senna 1 Tablet(s) Oral at bedtime    MEDICATIONS  (PRN):  polyethylene glycol 3350 17 Gram(s) Oral daily PRN Constipation    Currently Undergoing Physical/ Occupational Therapy at bedside.    Functional Status Assessment:         Therapeutic Interventions      Bed Mobility  Bed Mobility Training Rolling/Turning: supervision  Bed Mobility Training Scooting: supervision  Bed Mobility Training Sit-to-Supine: supervision;  contact guard  Bed Mobility Training Supine-to-Sit: supervision    Sit-Stand Transfer Training  Transfer Training Sit-to-Stand Transfer: moderate assist (50% patient effort);  2 person assist;  nonverbal cues (demo/gestures);  verbal cues;  rolling walker  Transfer Training Stand-to-Sit Transfer: minimum assist (75% patient effort);  2 person assist;  nonverbal cues (demo/gestures);  verbal cues;  rolling walker  Sit-to-Stand Transfer Training Transfer Safety Analysis: decreased balance;  decreased weight-shifting ability;  impaired balance;  decreased strength;  rolling walker    Gait Training  Gait Training: minimum assist (75% patient effort);  moderate assist (50% patient effort);  2 person assist;  nonverbal cues (demo/gestures);  verbal cues;  rolling walker;  3 side steps right. 3 side steps left, 5 side steps right, 5 side steps left  Gait Analysis: decreased clement;  decreased step length;  decreased toe clearance;  decreased weight-shifting ability;  impaired postural control;  impaired balance;  decreased strength;  rolling walker  Gait Number of Times:: x 3  Type of Rest Type of Rest: sitting  Duration of Rest Duration of Rest: 2 seated rests x 45 sec           PM&R Impression: as above    Current Disposition Plan:    [facility]

## 2020-12-23 NOTE — DISCHARGE NOTE PROVIDER - NSDCCPCAREPLAN_GEN_ALL_CORE_FT
PRINCIPAL DISCHARGE DIAGNOSIS  Diagnosis: Weakness  Assessment and Plan of Treatment: You presented to the hosptial with weakness after a fall which left you mostly bed bound. It appears that your weakness is scondary to deconditioning from your acute lack of physical activity. At the rehab you will continue to gain strength back in order for you to continue to perform daily acitivities independently.      SECONDARY DISCHARGE DIAGNOSES  Diagnosis: Urinary retention  Assessment and Plan of Treatment: You were retaining urine during this admission requiring you to get a catherization multiple times. we have prescirbed you Finasteride 5mg everyday. This will help shrink your prostate incase it is the cause of your retention. This medication is commonly used in BPH (Benign prostatic hyperplasia) and this condition may be contributing to any urinary symptoms you may have. please follow-up with your primary care provider to inquire about this condition.    Diagnosis: Lumbar herniated disc  Assessment and Plan of Treatment: Lumbar disc herniation is a narrowing of the spaces within your spine, which can put pressure on the nerves that travel through the spine. It occurs most often in the lower back and the neck and can cause some difficulty walking. We saw that you have evidence of spinal stenosis on CT scan and MRI. You should continue your physical therapy and rehab to help gain strength in your lower extremities.

## 2020-12-23 NOTE — PROGRESS NOTE ADULT - ASSESSMENT
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     Problem/Plan - 1:  ·  Problem: Inability to walk.  Plan: Pt is unable to state when he was last able to walk, states he is mostly in bed, he states whenever he has tried, he is unable to bear weight on R leg. 3+/5 to RLE and 4+ on LLE, Reflexes 2+ bilaterally and otherwise neurovascularly intact. Very low suspicion for cord compression/CVA. Upon chart review, patient was admitted with similar complaints in the past, CT lumbar spine with moderate bilateral neural foraminal narrowing from L1-S1. No previous neuro work up was done. Pt also generally weak likely in the setting of decreased appetite, decreased po intake (only drinking liquids)  - no Neurology consult for now  - CT lumber - No significant change, There is multilevel disc bulging that results in spinal canal stenosis. There is R>L neural foraminal narrowing at L4-5 and L5-S1 in the presence of facet arthrosis and disc bulging and marginal osteophyte which is perhaps mildly progressed compared to September 2019.  - PT/OT consulted - [facility]O  - fall precautions  - SW consult - Mechanical soft diet with thin liquids ( per pt's preference)  - Orthostatism positive x2, possibly still fluid deployed, 500NS bolus given.  - f/u Nutrition recs.    Problem/Plan - 2:  ·  Problem: Weakness of right lower extremity.  Plan: Patient with LE weakness, RLE 3-/5, LLE 4/5 s/p hip replacement  Due to LE weakness and new urine retention, following CT lumbar scan, will complete MRI spin to r/o spinal cord compression  -f/u MRI lumbar spin for spinal cord compression r/o  -f/u Ortho recs  -rest plan as above    #Urine retention  Patient with alternating urine retention and straight cath done by Urology  -f/u bladder scans  -consider Olmos for patient comfort if keep failing.    Problem/Plan - 3:  ·  Problem: Dysphagia.  Plan: Pt with reported history of dysphagia to solid foods, and CT abd with distended stomach. No history of CVA, GERD, or esophageal motility issues   - dysphagia screening and speech and swallow consult  - Mechanical soft diet with thin liquids ( per pt's preference).     Problem/Plan - 4:  ·  Problem: T wave inversion in EKG.  Plan: EKG on presentation with TWI in inferior and lateral leads, patient is asymptomatic of any chest pain, shortness of breath, palpitations, etc. Trop negative x1  - repeat EKG with similar pattern, patient asymptomatic, negative trop, with no dynamic in EKG. Possibly old event.  - on previous admission TTE with grade 1 diastolic dysfunction only.     Problem/Plan - 5:  ·  Problem: Lactate blood increased.  Plan: Lactate elevated to 4.7 on arrival, likely 2/2 dehydration, no hypotension, no signs of hypoperfusion or sepsis. After 3L NS, downtrended to 2.1.   - no longer trending  RESOLVED  - encourage po intake.     Problem/Plan - 6:  Problem: Hyponatremia. Plan: Pt hyponatremic to 133, likely in the setting of decreased solute intake/hypovolemic.   - f/u serum sodium  - low utility in performing urine lytes given 3L fluid resuscitation  RESOLVED on 12/21 AM labs.    Problem/Plan - 7:  ·  Problem: Nutrition, metabolism, and development symptoms.  Plan: : No IVF  E: Replete K>4 Mag>2  N: Full liquid diet  DVT: Lovenox 40mg Q24H  GI: not needed  Code; FULL  Dispo: RMF, PT consult, SW.   
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

## 2020-12-24 LAB
SARS-COV-2 IGG SERPL QL IA: NEGATIVE — SIGNIFICANT CHANGE UP
SARS-COV-2 IGM SERPL IA-ACNC: 0.01 INDEX — SIGNIFICANT CHANGE UP

## 2020-12-25 LAB
CULTURE RESULTS: SIGNIFICANT CHANGE UP
CULTURE RESULTS: SIGNIFICANT CHANGE UP
SPECIMEN SOURCE: SIGNIFICANT CHANGE UP
SPECIMEN SOURCE: SIGNIFICANT CHANGE UP

## 2020-12-31 DIAGNOSIS — R33.9 RETENTION OF URINE, UNSPECIFIED: ICD-10-CM

## 2020-12-31 DIAGNOSIS — Z88.7 ALLERGY STATUS TO SERUM AND VACCINE: ICD-10-CM

## 2020-12-31 DIAGNOSIS — E87.2 ACIDOSIS: ICD-10-CM

## 2020-12-31 DIAGNOSIS — R26.2 DIFFICULTY IN WALKING, NOT ELSEWHERE CLASSIFIED: ICD-10-CM

## 2020-12-31 DIAGNOSIS — R62.7 ADULT FAILURE TO THRIVE: ICD-10-CM

## 2020-12-31 DIAGNOSIS — Z74.01 BED CONFINEMENT STATUS: ICD-10-CM

## 2020-12-31 DIAGNOSIS — K52.9 NONINFECTIVE GASTROENTERITIS AND COLITIS, UNSPECIFIED: ICD-10-CM

## 2020-12-31 DIAGNOSIS — R13.10 DYSPHAGIA, UNSPECIFIED: ICD-10-CM

## 2020-12-31 DIAGNOSIS — F10.21 ALCOHOL DEPENDENCE, IN REMISSION: ICD-10-CM

## 2020-12-31 DIAGNOSIS — E86.0 DEHYDRATION: ICD-10-CM

## 2020-12-31 DIAGNOSIS — E87.6 HYPOKALEMIA: ICD-10-CM

## 2020-12-31 DIAGNOSIS — R29.898 OTHER SYMPTOMS AND SIGNS INVOLVING THE MUSCULOSKELETAL SYSTEM: ICD-10-CM

## 2020-12-31 DIAGNOSIS — M48.061 SPINAL STENOSIS, LUMBAR REGION WITHOUT NEUROGENIC CLAUDICATION: ICD-10-CM

## 2020-12-31 DIAGNOSIS — D64.9 ANEMIA, UNSPECIFIED: ICD-10-CM

## 2020-12-31 DIAGNOSIS — M19.90 UNSPECIFIED OSTEOARTHRITIS, UNSPECIFIED SITE: ICD-10-CM

## 2020-12-31 DIAGNOSIS — Z96.649 PRESENCE OF UNSPECIFIED ARTIFICIAL HIP JOINT: ICD-10-CM

## 2020-12-31 DIAGNOSIS — E87.1 HYPO-OSMOLALITY AND HYPONATREMIA: ICD-10-CM

## 2021-01-11 ENCOUNTER — APPOINTMENT (OUTPATIENT)
Dept: INTERNAL MEDICINE | Facility: CLINIC | Age: 71
End: 2021-01-11

## 2021-01-21 PROCEDURE — 80048 BASIC METABOLIC PNL TOTAL CA: CPT

## 2021-01-21 PROCEDURE — 82746 ASSAY OF FOLIC ACID SERUM: CPT

## 2021-01-21 PROCEDURE — 81003 URINALYSIS AUTO W/O SCOPE: CPT

## 2021-01-21 PROCEDURE — 36415 COLL VENOUS BLD VENIPUNCTURE: CPT

## 2021-01-21 PROCEDURE — 84443 ASSAY THYROID STIM HORMONE: CPT

## 2021-01-21 PROCEDURE — 83735 ASSAY OF MAGNESIUM: CPT

## 2021-01-21 PROCEDURE — 87040 BLOOD CULTURE FOR BACTERIA: CPT

## 2021-01-21 PROCEDURE — 82306 VITAMIN D 25 HYDROXY: CPT

## 2021-01-21 PROCEDURE — 99285 EMERGENCY DEPT VISIT HI MDM: CPT | Mod: 25

## 2021-01-21 PROCEDURE — 84484 ASSAY OF TROPONIN QUANT: CPT

## 2021-01-21 PROCEDURE — 84439 ASSAY OF FREE THYROXINE: CPT

## 2021-01-21 PROCEDURE — 92526 ORAL FUNCTION THERAPY: CPT

## 2021-01-21 PROCEDURE — 83036 HEMOGLOBIN GLYCOSYLATED A1C: CPT

## 2021-01-21 PROCEDURE — 92610 EVALUATE SWALLOWING FUNCTION: CPT

## 2021-01-21 PROCEDURE — 82553 CREATINE MB FRACTION: CPT

## 2021-01-21 PROCEDURE — 85027 COMPLETE CBC AUTOMATED: CPT

## 2021-01-21 PROCEDURE — 83930 ASSAY OF BLOOD OSMOLALITY: CPT

## 2021-01-21 PROCEDURE — 80053 COMPREHEN METABOLIC PANEL: CPT

## 2021-01-21 PROCEDURE — 71045 X-RAY EXAM CHEST 1 VIEW: CPT

## 2021-01-21 PROCEDURE — 82550 ASSAY OF CK (CPK): CPT

## 2021-01-21 PROCEDURE — 97530 THERAPEUTIC ACTIVITIES: CPT

## 2021-01-21 PROCEDURE — 81001 URINALYSIS AUTO W/SCOPE: CPT

## 2021-01-21 PROCEDURE — 85610 PROTHROMBIN TIME: CPT

## 2021-01-21 PROCEDURE — 85730 THROMBOPLASTIN TIME PARTIAL: CPT

## 2021-01-21 PROCEDURE — 72131 CT LUMBAR SPINE W/O DYE: CPT

## 2021-01-21 PROCEDURE — 80307 DRUG TEST PRSMV CHEM ANLYZR: CPT

## 2021-01-21 PROCEDURE — 72170 X-RAY EXAM OF PELVIS: CPT

## 2021-01-21 PROCEDURE — 97161 PT EVAL LOW COMPLEX 20 MIN: CPT

## 2021-01-21 PROCEDURE — 86769 SARS-COV-2 COVID-19 ANTIBODY: CPT

## 2021-01-21 PROCEDURE — 82962 GLUCOSE BLOOD TEST: CPT

## 2021-01-21 PROCEDURE — U0003: CPT

## 2021-01-21 PROCEDURE — 84100 ASSAY OF PHOSPHORUS: CPT

## 2021-01-21 PROCEDURE — 83605 ASSAY OF LACTIC ACID: CPT

## 2021-01-21 PROCEDURE — 82607 VITAMIN B-12: CPT

## 2021-01-21 PROCEDURE — 85025 COMPLETE CBC W/AUTO DIFF WBC: CPT

## 2021-01-21 PROCEDURE — 96360 HYDRATION IV INFUSION INIT: CPT

## 2021-01-21 PROCEDURE — 87086 URINE CULTURE/COLONY COUNT: CPT

## 2021-01-21 PROCEDURE — 70450 CT HEAD/BRAIN W/O DYE: CPT

## 2021-01-21 PROCEDURE — 93005 ELECTROCARDIOGRAM TRACING: CPT

## 2021-01-21 PROCEDURE — 74177 CT ABD & PELVIS W/CONTRAST: CPT

## 2021-01-21 PROCEDURE — 72148 MRI LUMBAR SPINE W/O DYE: CPT

## 2021-03-29 NOTE — ED ADULT NURSE NOTE - CAS EDP DISCH DISPOSITION ADMI
No significant past surgical history    No significant past surgical history     [facility]

## 2021-04-06 NOTE — PHYSICAL THERAPY INITIAL EVALUATION ADULT - PHYSICAL ASSIST/NONPHYSICAL ASSIST: SIT/SUPINE, REHAB EVAL
Detail Level: Generalized Detail Level: Zone When Should The Patient Follow-Up For Their Next Full-Body Skin Exam?: 3 Months Detail Level: Detailed verbal cues/2 person assist

## 2021-09-05 NOTE — DIETITIAN INITIAL EVALUATION ADULT. - FEEDING SKILL
independent PAST MEDICAL HISTORY:  Anemia     HLD (hyperlipidemia)     Hyperlipidemia     Osteoporosis     Rheumatoid arthritis     Subdural hematoma 5/2015

## 2021-09-20 NOTE — ED PROVIDER NOTE - TEMPLATE, MLM
Abdominal Pain, N/V/D Complex Repair And V-Y Plasty Text: The defect edges were debeveled with a #15 scalpel blade.  The primary defect was closed partially with a complex linear closure.  Given the location of the remaining defect, shape of the defect and the proximity to free margins a V-Y plasty was deemed most appropriate for complete closure of the defect.  Using a sterile surgical marker, an appropriate advancement flap was drawn incorporating the defect and placing the expected incisions within the relaxed skin tension lines where possible.    The area thus outlined was incised deep to adipose tissue with a #15 scalpel blade.  The skin margins were undermined to an appropriate distance in all directions utilizing iris scissors.

## 2021-10-04 NOTE — OCCUPATIONAL THERAPY INITIAL EVALUATION ADULT - PERTINENT HX OF CURRENT PROBLEM, REHAB EVAL
[General Appearance - Alert] : alert Dx: Weakness -unclear etiology at this time. CT T/L spine with mild to moderate neural foraminal narrowing at multiple levels otherwise ok. [General Appearance - In No Acute Distress] : in no acute distress [General Appearance - Well Nourished] : well nourished [General Appearance - Well Developed] : well developed [Oriented To Time, Place, And Person] : oriented to person, place, and time [Impaired Insight] : insight and judgment were intact [Affect] : the affect was normal [Mood] : the mood was normal [Cranial Nerves Optic (II)] : visual acuity intact bilaterally,  pupils equal round and reactive to light [Cranial Nerves Oculomotor (III)] : extraocular motion intact [Cranial Nerves Trigeminal (V)] : facial sensation intact symmetrically [Cranial Nerves Facial (VII)] : face symmetrical [Cranial Nerves Vestibulocochlear (VIII)] : hearing was intact bilaterally [Cranial Nerves Glossopharyngeal (IX)] : tongue and palate midline [Cranial Nerves Accessory (XI - Cranial And Spinal)] : head turning and shoulder shrug symmetric [Cranial Nerves Hypoglossal (XII)] : there was no tongue deviation with protrusion [Motor Tone] : muscle tone was normal in all four extremities [Motor Strength] : muscle strength was normal in all four extremities [Sensation Tactile Decrease] : light touch was intact [Abnormal Walk] : normal gait [Balance] : balance was intact

## 2021-12-20 ENCOUNTER — HOSPITAL ENCOUNTER (EMERGENCY)
Dept: HOSPITAL 74 - JERFT | Age: 71
LOS: 1 days | Discharge: HOME | End: 2021-12-21
Payer: COMMERCIAL

## 2021-12-20 VITALS — HEART RATE: 75 BPM | TEMPERATURE: 97.5 F | SYSTOLIC BLOOD PRESSURE: 145 MMHG | DIASTOLIC BLOOD PRESSURE: 79 MMHG

## 2021-12-20 VITALS — BODY MASS INDEX: 24.3 KG/M2
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
## 2021-12-20 DIAGNOSIS — S00.512A: Primary | ICD-10-CM

## 2022-05-25 NOTE — PATIENT PROFILE ADULT - FUNCTIONAL SCREEN CURRENT LEVEL: DRESSING, MLM
4 Eyes Skin Assessment     NAME:  Jean Ny  YOB: 1959  MEDICAL RECORD NUMBER:  [id number]    2 = assistive person

## 2022-12-19 NOTE — PHYSICAL THERAPY INITIAL EVALUATION ADULT - LEVEL OF CONSCIOUSNESS, REHAB EVAL
alert Chonodrocutaneous Helical Advancement Flap Text: Because of the tightness of the surrounding skin, the full-thickness nature of the defect with exposed cartilage, and to avoid deformity, a helical rim advancement flap was planed.  After prep and anesthesia, an incision was made in the anterior portion of the ear through the skin and the cartilage to the posterior perichondrium both superiorly and inferiorly within the scapha of the ear.  Inferiorly, this extended to the lobule where a Burow’s triangle was excised anteriorly.  The chondrocutaneous flaps were then  from the ear posteriorly at the level of the perichondrium to the postauricular sulcus.  A small rim of cartilage was also excised around the contour of the ear and after hemostasis obtained, the chondrocutaneous flaps were advanced and closed in a layered fashion

## 2023-01-18 NOTE — PATIENT PROFILE ADULT - COMPLETE THE FOLLOWING IF THE PATIENT REFUSES THE INFLUENZA VACCINE:
full weight-bearing
Risks/benefits discussed with patient/surrogate/Vaccine Information Sheet (VIS) provided-VIS date: 8/15/19

## 2023-07-28 NOTE — OCCUPATIONAL THERAPY INITIAL EVALUATION ADULT - STANDING BALANCE: STATIC
Detail Level: Detailed Consent: The patient's consent was obtained.  Risks were discussed, including but not limited to crusting, blistering, scarring, darker or lighter pigmentary change, recurrence, incomplete removal and infection. Show Aperture Variable?: Yes Duration Of Freeze Thaw-Cycle (Seconds): 0 Render Note In Bullet Format When Appropriate: No Post-Care Instructions: I reviewed with the patient in detail post-care instructions and provided them in written format.  Advised daily-bid gentle cleansing with soap and water or hydrogen peroxide followed by application of a thin layer of Vaseline until fully healed. poor plus

## 2023-09-20 NOTE — ED ADULT NURSE NOTE - ISOLATION TYPE:
Routine Visit Note:    Skill/education provided: cardiopulmonary assessment, med education, pain assessment, fall prevention education, labs per md order     Patient/caregiver response: pt stated understanding    Plan for next visit:  cardiopulmonary assessment, med education, pain assessment, fall prevention education, labs per md order     Other pertinent info: pt reported feeling very tired over the past three days None

## 2023-10-12 NOTE — OCCUPATIONAL THERAPY INITIAL EVALUATION ADULT - LEVEL OF INDEPENDENCE: SIT/SUPINE, REHAB EVAL
moderate assist (50% patients effort) Pinch Graft Text: The defect edges were debeveled with a #15 scalpel blade. Given the location of the defect, shape of the defect and the proximity to free margins a pinch graft was deemed most appropriate. Using a sterile surgical marker, the primary defect shape was transferred to the donor site. The area thus outlined was incised deep to adipose tissue with a #15 scalpel blade.  The harvested graft was then trimmed of adipose tissue until only dermis and epidermis was left. The skin margins of the secondary defect were undermined to an appropriate distance in all directions utilizing iris scissors.  The secondary defect was closed with interrupted buried subcutaneous sutures.  The skin edges were then re-apposed with running  sutures.  The skin graft was then placed in the primary defect and oriented appropriately.

## 2024-05-27 NOTE — DIETITIAN INITIAL EVALUATION ADULT. - OTHER INFO
PAST MEDICAL HISTORY:  Anemia     Chronic renal insufficiency     Colon cancer     Diabetes mellitus NIDDM    History of breast cancer right breast    History of herpes zoster     History of malignant neoplasm of parathyroid gland     History of thrombocytopenia     Hypertension     Thyroid nodule      69y/o male with history of Alcohol withdrawal seizures, OA s/p Left hip arthroplasty, UTI admitted for FTT and inability to walk .Per patient, he is able to take solids(despite admitted H&P documentation stating he had been complaining of intolerances to solid foods .He denied any N/V/D/C or pain .SKin with stage 2 sacrum and stage 1 coccyx which patient attributes to "I fell once" Denied to RD of staying in bed. He claimed his girlfriend would purchase food.(girlfriend reported to be also inpatient at [facility] at this time).Noted weights are with weight gain based on September 2019 weight:59kg August 2019:52kg and now present weight:68kg.pateints stated UBW:153lbs "2 years ago) Patient denied any weight loss.93% of IBW.Suspected inconsistency with diet recall and food procurement PTA. Pending SLP to assess any solid food intake barriers.Patient declined suggestion for ONS at this time.Does not appear malnourished based on visual assessment

## 2024-08-12 NOTE — ED PROVIDER NOTE - OBJECTIVE STATEMENT
Review of Systems Health Update    ALL PATIENTS:  No  Chest Pain  No   New shortness of breath  No   Unexplained weight change    WEIGHT MANAGEMENT:  Yes   Stress eating   No   New joint pain/redness  Yes   Dizziness/Lightheadedness  No   Leg Cramps  No   Worsening depression/Anxiety  No  Heartburn  Yes   Constipation/Diarrhea  No  Hair Loss  No   New Kidney stones  No   Rash  -----------------------------------------------------  Body Composition  InBody 570    Intracellular water = 45.6 lbs  Extracellular Water = 30.0 lbs  Dry Lean Mass = 27.1 lbs  Body Fat Mass = 84.0 lbs    Total Body Water = 75.6 lbs  Lean Body Mass = 102.7 lbs  Weight = 186.7 lbs    Skeletal Muscle Mass 55.3 lbs    Percent Body Fat =  44.9 %  Extracellular water/Total Body Water = 0.395  Visceral Fat Level = 19         67 yo with ho of alcohol withdrawal seizures, metabolic encephalopathy, hyponatremia and UTI w urinary retention sent to [place] one month ago after significant deconditioning and inability to walk. pt has been residing in rehab for the last month and as per patient he has not regained any strength, reports he cannot transfer to a wheelchair without the assistance of 3 people, unable to bear all of his weight due to generalized leg weakness, no numbness, no back pain, no fevers or recurrent urinary symptoms. pt's girlfriend / roommate reports their studio apt is not equipt to accommodate a handicapped person and that she fears pt will fall w no safety equipment and small space. 69 yo with ho of alcohol withdrawal seizures, metabolic encephalopathy, hyponatremia and UTI w urinary retention sent to [place] one month ago after significant deconditioning and inability to walk. pt has been residing in rehab for the last month and as per patient he has not regained any strength, reports he cannot transfer to a wheelchair without the assistance of 3 people, unable to bear all of his weight due to generalized leg weakness, no numbness, no back pain, no fevers or recurrent urinary symptoms. pt's girlfriend / roommate reports their studio apt is not equipt to accommodate a handicapped person and that she fears pt will fall w no safety equipment and small space.  in addition pt's girlfriend reports pt w occasional visual hallucinations, " red cloth hanging in the corner" " person behind the curtain" 67 yo with ho of alcohol withdrawal seizures, metabolic encephalopathy, hyponatremia and UTI w urinary retention sent to [place] one month ago after significant deconditioning and inability to walk. pt has been residing in rehab for the last month and as per patient he has not regained any strength, reports he cannot transfer to a wheelchair without the assistance of 3 people, unable to bear all of his weight due to generalized leg weakness, no numbness, no back pain, no fevers or recurrent urinary symptoms. pt reports chronic incontinence, wears depends, pt's girlfriend / roommate reports their studio apt is not equipt to accommodate a handicapped person and that she fears pt will fall w no safety equipment and small space.  in addition pt's girlfriend reports pt w occasional visual hallucinations, " red cloth hanging in the corner" " person behind the curtain"

## 2024-09-13 NOTE — OCCUPATIONAL THERAPY INITIAL EVALUATION ADULT - ADDITIONAL COMMENTS
In an effort to ensure that our patients [employer], a Team Member has reviewed your chart and identified an opportunity to provide the best care possible. An attempt was made to discuss or schedule due or overdue Preventive or Chronic Condition care.Care Gaps identified: Colorectal Cancer Screening.    The Outcome was Contact was not made, letter/portal message sent.  We are attempting to schedule a colonoscopy. If you have any questions or need help with scheduling, contact your primary care provider..   Type of Appointment needed:     
Patient reports being independent in all functional mobility and ADLs PTA w/ use of SC for functional mobility and shower chair for bathing. Denies use of other AD/AE at this time. Per PT eval patient uses rollator in the community, however patient did not report this at this time.

## 2024-12-04 NOTE — OCCUPATIONAL THERAPY INITIAL EVALUATION ADULT - LEVEL OF INDEPENDENCE: SCOOT/BRIDGE, REHAB EVAL
Bleeding that does not stop/Swelling that gets worse/Pain not relieved by Medications/Fever greater than (need to indicate Fahrenheit or Celsius)/Wound/Surgical Site with redness, or foul smelling discharge or pus supervision

## 2025-04-25 NOTE — H&P ADULT - NSHPREVIEWOFSYSTEMS_GEN_ALL_CORE
REVIEW OF SYSTEMS:    CONSTITUTIONAL: + weakness, no fevers or chills  EYES/ENT: No visual changes;  No vertigo or throat pain   NECK: No pain or stiffness  RESPIRATORY: No cough, wheezing, hemoptysis; No shortness of breath  CARDIOVASCULAR: No chest pain or palpitations  GASTROINTESTINAL: No abdominal or epigastric pain. No nausea, vomiting, or hematemesis; No diarrhea or constipation. No melena or hematochezia.  GENITOURINARY: No dysuria, frequency or hematuria  NEUROLOGICAL: No numbness or weakness  SKIN: No itching, rashes
no

## 2025-06-01 NOTE — ED ADULT NURSE REASSESSMENT NOTE - NS ED NURSE REASSESS COMMENT FT1
Following PO fluids pt vomited stomach contents. Pt reports this has been happening frequently the last two weeks and he has only been able to eat cold fruit. MD Young made aware. plan for ct abdo pelvis. allergic reaction

## 2025-07-30 NOTE — H&P ADULT - CLICK TO LAUNCH ORM
Dr. Magana messaged back to leave Ms. Romo on the Augmentin as prescribed (full dose).    Prescription pended for his authorization and I called to inform Brigette.  She was very appreciative.   .